# Patient Record
Sex: FEMALE | Race: WHITE | NOT HISPANIC OR LATINO | Employment: OTHER | URBAN - METROPOLITAN AREA
[De-identification: names, ages, dates, MRNs, and addresses within clinical notes are randomized per-mention and may not be internally consistent; named-entity substitution may affect disease eponyms.]

---

## 2017-01-01 ENCOUNTER — APPOINTMENT (OUTPATIENT)
Dept: GENERAL RADIOLOGY | Facility: HOSPITAL | Age: 62
End: 2017-01-01

## 2017-01-01 ENCOUNTER — HOSPITAL ENCOUNTER (OUTPATIENT)
Dept: RADIATION ONCOLOGY | Facility: HOSPITAL | Age: 62
Setting detail: RADIATION/ONCOLOGY SERIES
Discharge: HOME | End: 2017-11-16

## 2017-01-01 ENCOUNTER — OFFICE VISIT (OUTPATIENT)
Dept: RADIATION ONCOLOGY | Facility: HOSPITAL | Age: 62
End: 2017-01-01

## 2017-01-01 VITALS
WEIGHT: 192 LBS | BODY MASS INDEX: 30.99 KG/M2 | OXYGEN SATURATION: 95 % | TEMPERATURE: 97.8 F | DIASTOLIC BLOOD PRESSURE: 82 MMHG | HEART RATE: 75 BPM | SYSTOLIC BLOOD PRESSURE: 131 MMHG

## 2017-01-01 DIAGNOSIS — C34.11 MALIGNANT NEOPLASM OF UPPER LOBE OF RIGHT LUNG (HCC): Primary | ICD-10-CM

## 2017-01-01 PROCEDURE — G0463 HOSPITAL OUTPT CLINIC VISIT: HCPCS

## 2017-01-01 PROCEDURE — 71020 HC CHEST PA AND LATERAL: CPT

## 2017-01-02 ENCOUNTER — APPOINTMENT (OUTPATIENT)
Dept: CARDIOLOGY | Facility: HOSPITAL | Age: 62
End: 2017-01-02
Attending: FAMILY MEDICINE

## 2017-01-02 PROCEDURE — 93970 EXTREMITY STUDY: CPT

## 2017-01-06 ENCOUNTER — PREP FOR SURGERY (OUTPATIENT)
Dept: PULMONOLOGY | Facility: CLINIC | Age: 62
End: 2017-01-06

## 2017-01-06 DIAGNOSIS — R91.8 LUNG MASS: Primary | ICD-10-CM

## 2017-01-08 ENCOUNTER — APPOINTMENT (OUTPATIENT)
Dept: PREADMISSION TESTING | Facility: HOSPITAL | Age: 62
End: 2017-01-08

## 2017-01-08 VITALS — BODY MASS INDEX: 34.38 KG/M2 | HEIGHT: 63 IN | WEIGHT: 194 LBS

## 2017-01-08 DIAGNOSIS — R91.8 LUNG MASS: ICD-10-CM

## 2017-01-08 LAB
ALBUMIN SERPL-MCNC: 3.8 G/DL (ref 3.2–4.8)
ALBUMIN/GLOB SERPL: 1.4 G/DL (ref 1.5–2.5)
ALP SERPL-CCNC: 135 U/L (ref 25–100)
ALT SERPL W P-5'-P-CCNC: 51 U/L (ref 7–40)
ANION GAP SERPL CALCULATED.3IONS-SCNC: 8 MMOL/L (ref 3–11)
APTT PPP: 26.9 SECONDS (ref 24–31)
AST SERPL-CCNC: 28 U/L (ref 0–33)
BASOPHILS # BLD AUTO: 0.02 10*3/MM3 (ref 0–0.2)
BASOPHILS NFR BLD AUTO: 0.1 % (ref 0–1)
BILIRUB SERPL-MCNC: 0.2 MG/DL (ref 0.3–1.2)
BUN BLD-MCNC: 9 MG/DL (ref 9–23)
BUN/CREAT SERPL: 15 (ref 7–25)
CALCIUM SPEC-SCNC: 9.7 MG/DL (ref 8.7–10.4)
CHLORIDE SERPL-SCNC: 101 MMOL/L (ref 99–109)
CO2 SERPL-SCNC: 34 MMOL/L (ref 20–31)
CREAT BLD-MCNC: 0.6 MG/DL (ref 0.6–1.3)
DEPRECATED RDW RBC AUTO: 53.9 FL (ref 37–54)
EOSINOPHIL # BLD AUTO: 0.11 10*3/MM3 (ref 0.1–0.3)
EOSINOPHIL NFR BLD AUTO: 0.8 % (ref 0–3)
ERYTHROCYTE [DISTWIDTH] IN BLOOD BY AUTOMATED COUNT: 16.8 % (ref 11.3–14.5)
GFR SERPL CREATININE-BSD FRML MDRD: 101 ML/MIN/1.73
GLOBULIN UR ELPH-MCNC: 2.8 GM/DL
GLUCOSE BLD-MCNC: 100 MG/DL (ref 70–100)
HCT VFR BLD AUTO: 42.7 % (ref 34.5–44)
HGB BLD-MCNC: 14.7 G/DL (ref 11.5–15.5)
IMM GRANULOCYTES # BLD: 0.44 10*3/MM3 (ref 0–0.03)
IMM GRANULOCYTES NFR BLD: 3.3 % (ref 0–0.6)
INR PPP: 0.88
LYMPHOCYTES # BLD AUTO: 1.88 10*3/MM3 (ref 0.6–4.8)
LYMPHOCYTES NFR BLD AUTO: 14 % (ref 24–44)
MCH RBC QN AUTO: 30.2 PG (ref 27–31)
MCHC RBC AUTO-ENTMCNC: 34.4 G/DL (ref 32–36)
MCV RBC AUTO: 87.7 FL (ref 80–99)
MONOCYTES # BLD AUTO: 1.54 10*3/MM3 (ref 0–1)
MONOCYTES NFR BLD AUTO: 11.4 % (ref 0–12)
NEUTROPHILS # BLD AUTO: 9.46 10*3/MM3 (ref 1.5–8.3)
NEUTROPHILS NFR BLD AUTO: 70.4 % (ref 41–71)
PLATELET # BLD AUTO: 342 10*3/MM3 (ref 150–450)
PMV BLD AUTO: 10.2 FL (ref 6–12)
POTASSIUM BLD-SCNC: 4 MMOL/L (ref 3.5–5.5)
PROT SERPL-MCNC: 6.6 G/DL (ref 5.7–8.2)
PROTHROMBIN TIME: 9.5 SECONDS (ref 9.6–11.5)
RBC # BLD AUTO: 4.87 10*6/MM3 (ref 3.89–5.14)
SODIUM BLD-SCNC: 143 MMOL/L (ref 132–146)
WBC NRBC COR # BLD: 13.45 10*3/MM3 (ref 3.5–10.8)

## 2017-01-08 PROCEDURE — 93005 ELECTROCARDIOGRAM TRACING: CPT

## 2017-01-08 PROCEDURE — 85610 PROTHROMBIN TIME: CPT | Performed by: NURSE PRACTITIONER

## 2017-01-08 PROCEDURE — 93010 ELECTROCARDIOGRAM REPORT: CPT | Performed by: INTERNAL MEDICINE

## 2017-01-08 PROCEDURE — 85025 COMPLETE CBC W/AUTO DIFF WBC: CPT | Performed by: NURSE PRACTITIONER

## 2017-01-08 PROCEDURE — 36415 COLL VENOUS BLD VENIPUNCTURE: CPT

## 2017-01-08 PROCEDURE — 85730 THROMBOPLASTIN TIME PARTIAL: CPT | Performed by: NURSE PRACTITIONER

## 2017-01-08 PROCEDURE — 80053 COMPREHEN METABOLIC PANEL: CPT | Performed by: NURSE PRACTITIONER

## 2017-01-08 NOTE — PAT
ABNORMAL EKG REVIEWED WITH AND CLEARED BY DR MUJICA.     PATIENT REQUESTING TO HAVE IV ANTIBIOTICS SHE HAS BEEN RECEIVING DAILY AT [facility] INFECTIOUS DISEASE WHILE IN FOR PROCEDURE TOMORROW AS WELL AS ANY LAB WORK NECESSARY PER INFECTIOUS DISEASE.    PICC LINE SITE RUE NO REDNESS EDEMA OR DRAINAGE. DRESSING CLEAN DRY AND INTACT

## 2017-01-09 ENCOUNTER — APPOINTMENT (OUTPATIENT)
Dept: GENERAL RADIOLOGY | Facility: HOSPITAL | Age: 62
End: 2017-01-09

## 2017-01-09 ENCOUNTER — ANESTHESIA EVENT (OUTPATIENT)
Dept: GASTROENTEROLOGY | Facility: HOSPITAL | Age: 62
End: 2017-01-09

## 2017-01-09 ENCOUNTER — ANESTHESIA (OUTPATIENT)
Dept: GASTROENTEROLOGY | Facility: HOSPITAL | Age: 62
End: 2017-01-09

## 2017-01-09 ENCOUNTER — HOSPITAL ENCOUNTER (OUTPATIENT)
Facility: HOSPITAL | Age: 62
Setting detail: HOSPITAL OUTPATIENT SURGERY
Discharge: HOME | End: 2017-01-09
Attending: INTERNAL MEDICINE | Admitting: INTERNAL MEDICINE

## 2017-01-09 VITALS
RESPIRATION RATE: 18 BRPM | HEART RATE: 80 BPM | OXYGEN SATURATION: 94 % | SYSTOLIC BLOOD PRESSURE: 116 MMHG | TEMPERATURE: 97.7 F | DIASTOLIC BLOOD PRESSURE: 71 MMHG

## 2017-01-09 DIAGNOSIS — R91.8 LUNG MASS: ICD-10-CM

## 2017-01-09 PROCEDURE — 31653 BRONCH EBUS SAMPLNG 3/> NODE: CPT | Performed by: INTERNAL MEDICINE

## 2017-01-09 PROCEDURE — 25010000002 DEXAMETHASONE PER 1 MG: Performed by: NURSE ANESTHETIST, CERTIFIED REGISTERED

## 2017-01-09 PROCEDURE — 87102 FUNGUS ISOLATION CULTURE: CPT | Performed by: INTERNAL MEDICINE

## 2017-01-09 PROCEDURE — 71020 HC CHEST PA AND LATERAL: CPT

## 2017-01-09 PROCEDURE — 31628 BRONCHOSCOPY/LUNG BX EACH: CPT | Performed by: INTERNAL MEDICINE

## 2017-01-09 PROCEDURE — 87206 SMEAR FLUORESCENT/ACID STAI: CPT | Performed by: INTERNAL MEDICINE

## 2017-01-09 PROCEDURE — 31629 BRONCHOSCOPY/NEEDLE BX EACH: CPT | Performed by: INTERNAL MEDICINE

## 2017-01-09 PROCEDURE — 25010000002 CEFTRIAXONE PER 250 MG: Performed by: INTERNAL MEDICINE

## 2017-01-09 PROCEDURE — 31625 BRONCHOSCOPY W/BIOPSY(S): CPT | Performed by: INTERNAL MEDICINE

## 2017-01-09 PROCEDURE — 25010000002 NEOSTIGMINE 10 MG/10ML SOLUTION: Performed by: NURSE ANESTHETIST, CERTIFIED REGISTERED

## 2017-01-09 PROCEDURE — 31623 DX BRONCHOSCOPE/BRUSH: CPT | Performed by: INTERNAL MEDICINE

## 2017-01-09 PROCEDURE — 25010000002 ONDANSETRON PER 1 MG: Performed by: NURSE ANESTHETIST, CERTIFIED REGISTERED

## 2017-01-09 PROCEDURE — 76000 FLUOROSCOPY <1 HR PHYS/QHP: CPT

## 2017-01-09 PROCEDURE — 88305 TISSUE EXAM BY PATHOLOGIST: CPT | Performed by: INTERNAL MEDICINE

## 2017-01-09 PROCEDURE — 25010000002 PROPOFOL 10 MG/ML EMULSION: Performed by: NURSE ANESTHETIST, CERTIFIED REGISTERED

## 2017-01-09 PROCEDURE — 31627 NAVIGATIONAL BRONCHOSCOPY: CPT | Performed by: INTERNAL MEDICINE

## 2017-01-09 PROCEDURE — 87106 FUNGI IDENTIFICATION YEAST: CPT | Performed by: INTERNAL MEDICINE

## 2017-01-09 PROCEDURE — 71010 HC CHEST PA OR AP: CPT

## 2017-01-09 PROCEDURE — 31624 DX BRONCHOSCOPE/LAVAGE: CPT | Performed by: INTERNAL MEDICINE

## 2017-01-09 PROCEDURE — 76001 HC FLUORO OVER 1 HOUR: CPT

## 2017-01-09 PROCEDURE — 87205 SMEAR GRAM STAIN: CPT | Performed by: INTERNAL MEDICINE

## 2017-01-09 PROCEDURE — 87070 CULTURE OTHR SPECIMN AEROBIC: CPT | Performed by: INTERNAL MEDICINE

## 2017-01-09 PROCEDURE — 87116 MYCOBACTERIA CULTURE: CPT | Performed by: INTERNAL MEDICINE

## 2017-01-09 RX ORDER — FAMOTIDINE 20 MG/1
20 TABLET, FILM COATED ORAL ONCE
Status: CANCELLED | OUTPATIENT
Start: 2017-01-09 | End: 2017-01-09

## 2017-01-09 RX ORDER — PROMETHAZINE HYDROCHLORIDE 25 MG/ML
6.25 INJECTION, SOLUTION INTRAMUSCULAR; INTRAVENOUS ONCE AS NEEDED
Status: DISCONTINUED | OUTPATIENT
Start: 2017-01-09 | End: 2017-01-09 | Stop reason: HOSPADM

## 2017-01-09 RX ORDER — FENTANYL CITRATE 50 UG/ML
50 INJECTION, SOLUTION INTRAMUSCULAR; INTRAVENOUS
Status: DISCONTINUED | OUTPATIENT
Start: 2017-01-09 | End: 2017-01-09 | Stop reason: HOSPADM

## 2017-01-09 RX ORDER — FAMOTIDINE 10 MG/ML
20 INJECTION, SOLUTION INTRAVENOUS ONCE
Status: CANCELLED | OUTPATIENT
Start: 2017-01-09 | End: 2017-01-09

## 2017-01-09 RX ORDER — LIDOCAINE HYDROCHLORIDE 10 MG/ML
1 INJECTION, SOLUTION EPIDURAL; INFILTRATION; INTRACAUDAL; PERINEURAL ONCE
Status: CANCELLED | OUTPATIENT
Start: 2017-01-09 | End: 2017-01-09

## 2017-01-09 RX ORDER — PROPOFOL 10 MG/ML
VIAL (ML) INTRAVENOUS AS NEEDED
Status: DISCONTINUED | OUTPATIENT
Start: 2017-01-09 | End: 2017-01-09

## 2017-01-09 RX ORDER — MEPERIDINE HYDROCHLORIDE 25 MG/ML
12.5 INJECTION INTRAMUSCULAR; INTRAVENOUS; SUBCUTANEOUS
Status: DISCONTINUED | OUTPATIENT
Start: 2017-01-09 | End: 2017-01-09 | Stop reason: HOSPADM

## 2017-01-09 RX ORDER — ATRACURIUM BESYLATE 10 MG/ML
INJECTION, SOLUTION INTRAVENOUS AS NEEDED
Status: DISCONTINUED | OUTPATIENT
Start: 2017-01-09 | End: 2017-01-09 | Stop reason: SURG

## 2017-01-09 RX ORDER — SODIUM CHLORIDE, SODIUM LACTATE, POTASSIUM CHLORIDE, CALCIUM CHLORIDE 600; 310; 30; 20 MG/100ML; MG/100ML; MG/100ML; MG/100ML
9 INJECTION, SOLUTION INTRAVENOUS CONTINUOUS
Status: CANCELLED | OUTPATIENT
Start: 2017-01-09

## 2017-01-09 RX ORDER — FAMOTIDINE 10 MG/ML
20 INJECTION, SOLUTION INTRAVENOUS ONCE
Status: DISCONTINUED | OUTPATIENT
Start: 2017-01-09 | End: 2017-01-09 | Stop reason: HOSPADM

## 2017-01-09 RX ORDER — NEOSTIGMINE METHYLSULFATE 1 MG/ML
INJECTION, SOLUTION INTRAVENOUS AS NEEDED
Status: DISCONTINUED | OUTPATIENT
Start: 2017-01-09 | End: 2017-01-09 | Stop reason: SURG

## 2017-01-09 RX ORDER — PROPOFOL 10 MG/ML
VIAL (ML) INTRAVENOUS AS NEEDED
Status: DISCONTINUED | OUTPATIENT
Start: 2017-01-09 | End: 2017-01-09 | Stop reason: SURG

## 2017-01-09 RX ORDER — SODIUM CHLORIDE 0.9 % (FLUSH) 0.9 %
1-10 SYRINGE (ML) INJECTION AS NEEDED
Status: CANCELLED | OUTPATIENT
Start: 2017-01-09

## 2017-01-09 RX ORDER — LIDOCAINE HYDROCHLORIDE 20 MG/ML
INJECTION, SOLUTION INFILTRATION; PERINEURAL AS NEEDED
Status: DISCONTINUED | OUTPATIENT
Start: 2017-01-09 | End: 2017-01-09 | Stop reason: SURG

## 2017-01-09 RX ORDER — SODIUM CHLORIDE 0.9 % (FLUSH) 0.9 %
1-10 SYRINGE (ML) INJECTION AS NEEDED
Status: DISCONTINUED | OUTPATIENT
Start: 2017-01-09 | End: 2017-01-09 | Stop reason: HOSPADM

## 2017-01-09 RX ORDER — PROMETHAZINE HYDROCHLORIDE 25 MG/1
25 TABLET ORAL ONCE AS NEEDED
Status: DISCONTINUED | OUTPATIENT
Start: 2017-01-09 | End: 2017-01-09 | Stop reason: HOSPADM

## 2017-01-09 RX ORDER — SODIUM CHLORIDE, SODIUM LACTATE, POTASSIUM CHLORIDE, CALCIUM CHLORIDE 600; 310; 30; 20 MG/100ML; MG/100ML; MG/100ML; MG/100ML
9 INJECTION, SOLUTION INTRAVENOUS CONTINUOUS
Status: DISCONTINUED | OUTPATIENT
Start: 2017-01-09 | End: 2017-01-09 | Stop reason: HOSPADM

## 2017-01-09 RX ORDER — PROMETHAZINE HYDROCHLORIDE 25 MG/1
25 SUPPOSITORY RECTAL ONCE AS NEEDED
Status: DISCONTINUED | OUTPATIENT
Start: 2017-01-09 | End: 2017-01-09 | Stop reason: HOSPADM

## 2017-01-09 RX ORDER — GLYCOPYRROLATE 0.2 MG/ML
INJECTION INTRAMUSCULAR; INTRAVENOUS AS NEEDED
Status: DISCONTINUED | OUTPATIENT
Start: 2017-01-09 | End: 2017-01-09 | Stop reason: SURG

## 2017-01-09 RX ORDER — ONDANSETRON 2 MG/ML
4 INJECTION INTRAMUSCULAR; INTRAVENOUS ONCE AS NEEDED
Status: COMPLETED | OUTPATIENT
Start: 2017-01-09 | End: 2017-01-09

## 2017-01-09 RX ORDER — DEXAMETHASONE SODIUM PHOSPHATE 10 MG/ML
INJECTION INTRAMUSCULAR; INTRAVENOUS AS NEEDED
Status: DISCONTINUED | OUTPATIENT
Start: 2017-01-09 | End: 2017-01-09 | Stop reason: SURG

## 2017-01-09 RX ORDER — IPRATROPIUM BROMIDE AND ALBUTEROL SULFATE 2.5; .5 MG/3ML; MG/3ML
3 SOLUTION RESPIRATORY (INHALATION) ONCE AS NEEDED
Status: DISCONTINUED | OUTPATIENT
Start: 2017-01-09 | End: 2017-01-09 | Stop reason: HOSPADM

## 2017-01-09 RX ADMIN — DEXAMETHASONE SODIUM PHOSPHATE 8 MG: 10 INJECTION INTRAMUSCULAR; INTRAVENOUS at 09:25

## 2017-01-09 RX ADMIN — ROBINUL 0.2 MG: 0.2 INJECTION INTRAMUSCULAR; INTRAVENOUS at 10:55

## 2017-01-09 RX ADMIN — PROPOFOL 100 MG: 10 INJECTION, EMULSION INTRAVENOUS at 09:16

## 2017-01-09 RX ADMIN — NEOSTIGMINE METHYLSULFATE 2 MG: 1 INJECTION, SOLUTION INTRAVENOUS at 10:55

## 2017-01-09 RX ADMIN — CEFTRIAXONE 2 G: 1 INJECTION, POWDER, FOR SOLUTION INTRAMUSCULAR; INTRAVENOUS at 09:25

## 2017-01-09 RX ADMIN — EPHEDRINE SULFATE 10 MG: 50 INJECTION INTRAMUSCULAR; INTRAVENOUS; SUBCUTANEOUS at 09:48

## 2017-01-09 RX ADMIN — ONDANSETRON 4 MG: 2 INJECTION INTRAMUSCULAR; INTRAVENOUS at 10:55

## 2017-01-09 RX ADMIN — ATRACURIUM BESYLATE 40 MG: 10 INJECTION, SOLUTION INTRAVENOUS at 09:16

## 2017-01-09 RX ADMIN — ATRACURIUM BESYLATE 10 MG: 10 INJECTION, SOLUTION INTRAVENOUS at 10:00

## 2017-01-09 RX ADMIN — EPHEDRINE SULFATE 10 MG: 50 INJECTION INTRAMUSCULAR; INTRAVENOUS; SUBCUTANEOUS at 09:49

## 2017-01-09 RX ADMIN — EPHEDRINE SULFATE 5 MG: 50 INJECTION INTRAMUSCULAR; INTRAVENOUS; SUBCUTANEOUS at 10:34

## 2017-01-09 RX ADMIN — LIDOCAINE HYDROCHLORIDE 50 MG: 20 INJECTION, SOLUTION INFILTRATION; PERINEURAL at 09:16

## 2017-01-09 RX ADMIN — SODIUM CHLORIDE, POTASSIUM CHLORIDE, SODIUM LACTATE AND CALCIUM CHLORIDE 9 ML/HR: 600; 310; 30; 20 INJECTION, SOLUTION INTRAVENOUS at 08:36

## 2017-01-09 RX ADMIN — SODIUM CHLORIDE, POTASSIUM CHLORIDE, SODIUM LACTATE AND CALCIUM CHLORIDE: 600; 310; 30; 20 INJECTION, SOLUTION INTRAVENOUS at 09:13

## 2017-01-09 NOTE — PLAN OF CARE
Problem: Bronchoscopy (Adult)  Goal: Signs and Symptoms of Listed Potential Problems Will be Absent or Manageable (Bronchoscopy)  Outcome: Outcome(s) achieved Date Met:  01/09/17 01/09/17 1141   Bronchoscopy   Problems Assessed (Bronchoscopy) all   Problems Present (Bronchoscopy) none

## 2017-01-09 NOTE — ANESTHESIA PREPROCEDURE EVALUATION
Anesthesia Evaluation      Airway   Mallampati: I  TM distance: <3 FB  Neck ROM: full  no difficulty expected  Dental - normal exam     Pulmonary - normal exam   (+) pneumonia , COPD, shortness of breath,   Cardiovascular - normal exam  (+) hypertension, CAD,     Neuro/Psych  (+) seizures,    GI/Hepatic/Renal/Endo    (+)  GERD, hypothyroidism,     Musculoskeletal     Abdominal  - normal exam    Bowel sounds: normal.   Substance History      OB/GYN          Other                           Anesthesia Plan    ASA 3     general     intravenous induction

## 2017-01-09 NOTE — ANESTHESIA POSTPROCEDURE EVALUATION
Patient: Leonarda Benitez    Procedure Summary     Date Anesthesia Start Anesthesia Stop Room / Location    01/09/17 0913   [facility]       Procedure Diagnosis Surgeon Provider    BRONCHOSCOPY WITH NAVIGATION AND FLUORO (N/A Bronchus); BRONCHOSCOPY WITH ENDOBRONCHIAL ULTRASOUND (N/A Bronchus) Lung mass  (Lung mass [R91.8]) MD Dakota Nicholson MD          Anesthesia Type: general  Last vitals  BP   148/87   Temp   97.7   Pulse 86   Resp   24   SpO2   90%     Post Anesthesia Care and Evaluation    Patient location during evaluation: PACU  Patient participation: complete - patient participated  Level of consciousness: awake and alert  Pain management: adequate  Airway patency: patent  Anesthetic complications: No anesthetic complications  Cardiovascular status: hemodynamically stable and acceptable  Respiratory status: nonlabored ventilation, acceptable and nasal cannula  Hydration status: acceptable

## 2017-01-09 NOTE — OP NOTE
Bronchoscopy Procedural Note       Date of Operation: 1/9/2017    Indication: This is a 61 y.o.    Pre-op Diagnosis: Right upper lobe lung mass, left lower lobe pulmonary infiltrate, borderline mediastinal lymphadenopathy.    Post-op Diagnosis: Right upper lobe lung mass, left lower lobe pulmonary infiltrate, borderline mediastinal lymphadenopathy.    Surgeon: Ab Lockett MD    Referring Physician: Ninoska Gustafson    Procedures Performed:  Flexible fiberoptic bronchoscopy  Bronchial washings  BAL right upper lobe  Endobronchial brushings right upper lobe  Endobronchial biopsies right upper lobe  Endobronchial biopsy left lower lobe  Endobronchial ultrasound guided TBNA mediastinal lymph node  Navigational and radial / peripheral EBUS guided TBNA of right upper lobe lung mass with fluoroscopy.     Anesthesia: Gen. endotracheal, per anesthesia    Estimated Blood Loss: <5 ml    Description of Procedure:  Informed consent was obtained from the patient after the risks and benefits of the procedure including the risk of bleeding, infection, pneumothorax, medication reaction, and death were described.  The patient agreed and a signed informed consent forms placed on the chart.    The patient was brought to the endoscopy room air she was intubated by anesthesia to monitor the patient throughout the case.    I inserted an endobronchial ultrasound scope through the existing endotracheal tube and advanced it to the distal trachea.  I examined station 4L, 4R, 7, 10 L, 10 R, 11 L, and 11 R.  There was an approximately 1 cm lymph node at station 7.  There were tiny lymph nodes at stations 11 L and 11 R which were too small for safe biopsy attempt.  I then used endobronchial ultrasound to do multiple passes of a 19-gauge FNA needle at station 7 with adequate specimen obtained and minimal bleeding which was self-limited.  I then removed the endobronchial ultrasound scope.    Next, I inserted a therapeutic bronchoscope and  advanced it to the distal trachea.  I examined the right bronchial tree to at least the segmental level throughout.  There is some thick white mucus in the right lower lobe which was lavaged and suctioned clear.  There was normal anatomy.  There was an apparent endobronchial lesion in the apical segment of the right upper lobe which was very distal and could barely be seen through the scope.  I did do an endobronchial brushing and multiple endobronchial biopsies at this site.  I also did a bronchoalveolar lavage with 60 mL of sterile saline ×2 with approximately 45 mL of bloody return.  There is minimal oozing of blood after the biopsies/brushings which was self-limited.    I then examined the left bronchial tree to at least the segmental level.  Again, there is mild diffuse inflammation and normal anatomy.  There is some slightly abnormal mucosa within the left lower lobe, and an area noted to have a thick mucus plugging on prior bronchoscopy.  I took one endobronchial biopsy at this area with minimal oozing of blood which was self-limited.  Next    At that point, I positioned the scope within the trachea and inserted the extended working channel/locatable guide apparatus for the navigation portion of the procedure.  I did an automatic registration process which was deemed to be adequate.  I then navigated to the previously planned biopsy site within the right upper lobe at the site of the lung mass on CT scan using the navigational procedure.  Fluoroscopy was brought in and position was marked.  Radial EBUS was used to confirm position adjacent to the lung mass.  I then did multiple transbronchial biopsies at this location with adequate specimen obtained.  There was no significant bleeding.  I then did a mini bronchoalveolar lavage at that location and removed the extended working channel.    Finally, I did a brief airway inspection and cleaned up any residual secretions.  I turned the patient over to anesthesia  for extubation and postprocedure monitoring.    There were no immediate complications.    Specimens obtained:  1.  Right upper lobe endobronchial brushing.  2.  Right upper lobe bronchoalveolar lavage.  3.  Right upper lobe endobronchial biopsy.  4.  Left lower lobe endobronchial biopsy.  5.  Bronchial washings.  6.  Station 7 transbronchial needle aspiration.  7.  Transbronchial biopsy right upper lobe.    Findings and Recommendations:    Findings as noted above.    Patient is to follow up this Wednesday at 11 AM with me in the office for preliminary results.      Ab Lockett MD  Pulmonary and Critical Care Medicine   01/09/17 11:12 AM

## 2017-01-09 NOTE — PLAN OF CARE
Problem: Patient Care Overview (Adult)  Goal: Adult Individualization and Mutuality  Outcome: Outcome(s) achieved Date Met:  01/09/17

## 2017-01-09 NOTE — ANESTHESIA PROCEDURE NOTES
Airway  Urgency: elective    Date/Time: 1/9/2017 9:15 AM  End Time:1/9/2017 9:20 AM  Airway not difficult    General Information and Staff    Patient location during procedure: OR  Anesthesiologist: ELIAN CODY  CRNA: RAMILA LONDONO    Indications and Patient Condition  Indications for airway management: airway protection    Preoxygenated: yes  MILS not maintained throughout  Mask difficulty assessment: 1 - vent by mask    Final Airway Details  Final airway type: endotracheal airway      Successful airway: ETT  Cuffed: yes   Successful intubation technique: direct laryngoscopy  Facilitating devices/methods: intubating stylet and cricoid pressure  Endotracheal tube insertion site: oral  Blade: Jun  Blade size: #3  ETT size: 8.5 mm  Cormack-Lehane Classification: grade I - full view of glottis  Placement verified by: chest auscultation and capnometry   Inital cuff pressure (cm H2O): 21  Cuff volume (mL): 6  Measured from: lips  ETT to lips (cm): 20  Number of attempts at approach: 1    Additional Comments  Negative epigastric sounds, Breath sound equal bilaterally with symmetric chest rise and fall

## 2017-01-09 NOTE — PLAN OF CARE
Problem: Bronchoscopy (Adult)  Goal: Signs and Symptoms of Listed Potential Problems Will be Absent or Manageable (Bronchoscopy)  Outcome: Ongoing (interventions implemented as appropriate)

## 2017-01-09 NOTE — IP AVS SNAPSHOT
AFTER VISIT SUMMARY             Leonarda Benitez           About your hospitalization     You were admitted on:  January 9, 2017 You last received care in the:  [facility]       Procedures & Surgeries      Procedure(s) (LRB):  BRONCHOSCOPY WITH NAVIGATION AND FLUORO (N/A)  BRONCHOSCOPY WITH ENDOBRONCHIAL ULTRASOUND (N/A)     1/9/2017     Surgeon(s):  Ab Lockett MD  -------------------      Medications    If you or your caregiver advised us that you are currently taking a medication and that medication is marked below as “Resume”, this simply indicates that we have reviewed those medications to make sure our new therapy recommendations do not interfere.  If you have concerns about medications other than those new ones which we are prescribing today, please consult the physician who prescribed them (or your primary physician).  Our review of your home medications is not meant to indicate that we are directing their use.             Your Medications      CONTINUE taking these medications     acetaminophen 500 MG tablet   Take 650 mg by mouth 3 (Three) Times a Day As Needed for mild pain (1-3).   Commonly known as:  TYLENOL           AMBIEN 10 MG tablet   Take 10 mg by mouth Every Night.   Generic drug:  zolpidem           amLODIPine 10 MG tablet   Take 1 tablet by mouth Every Night.   Commonly known as:  NORVASC           atorvastatin 40 MG tablet   Take 40 mg by mouth Every Night.   Commonly known as:  LIPITOR           cefTRIAXone 40 MG/ML IVPB   Infuse 50 mL into a venous catheter Daily. Indications: Bacteria in the Blood   Commonly known as:  ROCEPHIN           citalopram 10 MG tablet   Take 40 mg by mouth Daily.   Commonly known as:  CeleXA           furosemide 20 MG tablet   Take 1 tablet by mouth Daily.   Commonly known as:  LASIX           HYDROcodone-acetaminophen 5-325 MG per tablet   Take 2 tablets by mouth Every 6 (Six) Hours As Needed for moderate pain (4-6) for up to 8  days.   Commonly known as:  NORCO           levothyroxine 88 MCG tablet   Take 88 mcg by mouth Daily.   Commonly known as:  SYNTHROID, LEVOTHROID           omeprazole 20 MG capsule   Take 20 mg by mouth Daily.   Commonly known as:  priLOSEC           saccharomyces boulardii 250 MG capsule   Take 1 capsule by mouth 2 (Two) Times a Day.   Commonly known as:  FLORASTOR                      Your Medications      Your Medication List           Morning Noon Evening Bedtime As Needed    acetaminophen 500 MG tablet   Take 650 mg by mouth 3 (Three) Times a Day As Needed for mild pain (1-3).   Commonly known as:  TYLENOL                                AMBIEN 10 MG tablet   Take 10 mg by mouth Every Night.   Generic drug:  zolpidem                                amLODIPine 10 MG tablet   Take 1 tablet by mouth Every Night.   Commonly known as:  NORVASC                                atorvastatin 40 MG tablet   Take 40 mg by mouth Every Night.   Commonly known as:  LIPITOR                                cefTRIAXone 40 MG/ML IVPB   Infuse 50 mL into a venous catheter Daily. Indications: Bacteria in the Blood   Commonly known as:  ROCEPHIN                                citalopram 10 MG tablet   Take 40 mg by mouth Daily.   Commonly known as:  CeleXA                                furosemide 20 MG tablet   Take 1 tablet by mouth Daily.   Commonly known as:  LASIX                                HYDROcodone-acetaminophen 5-325 MG per tablet   Take 2 tablets by mouth Every 6 (Six) Hours As Needed for moderate pain (4-6) for up to 8 days.   Commonly known as:  NORCO                                levothyroxine 88 MCG tablet   Take 88 mcg by mouth Daily.   Commonly known as:  SYNTHROID, LEVOTHROID                                omeprazole 20 MG capsule   Take 20 mg by mouth Daily.   Commonly known as:  priLOSEC                                saccharomyces boulardii 250 MG capsule   Take 1 capsule by mouth 2 (Two) Times a Day.  "  Commonly known as:  FLORASTOR                                         Instructions for After Discharge        Discharge References/Attachments     GENERAL ANESTHESIA, ADULT, CARE AFTER (ENGLISH)    FLEXIBLE BRONCHOSCOPY, CARE AFTER, EASY-TO-READ (ENGLISH)       Follow-ups for After Discharge        Follow-up Information     Follow up with Ab Lockett MD .    Specialty:  Pulmonary Disease    Why:  follow up on 16 at 11am.      Contact information:    Aakash DAVISON   JOR[address] [place] [address]  [phone]        [employer]end[employer] Signup     [employer] allows you to send messages to your doctor, view your test results, renew your prescriptions, schedule appointments, and more. To sign up, go to [employer] and click on the Sign Up Now link in the New User? box. Enter your Blendagram Activation Code exactly as it appears below along with the last four digits of your Social Security Number and your Date of Birth () to complete the sign-up process. If you do not sign up before the expiration date, you must request a new code.    Blendagram Activation Code: [email or web address]  Expires: 2017  1:25 PM    If you have questions, you can email [email or web address] or call [phone] to talk to our Blend[employer] staff. Remember, Blendagram is NOT to be used for urgent needs. For medical emergencies, dial 911.           Summary of Your Hospitalization        Reason for Hospitalization     Your primary diagnosis was:  Not on File      Care Providers     Provider Service Role Specialty    Ab Lockett MD Pulmonology Attending Provider Pulmonary Disease    Ab Lockett MD Pulmonology Surgeon  Pulmonary Disease      Your Allergies  Date Reviewed: 2017    Allergen Reactions    Aspirin Itching         Ciprofloxacin Myalgia    Affects muscles         Codeine Itching         Eggs Or Egg-Derived Products Not Noted    PATIENT STATES \"HISTORY OF ALLERGY TESTING TOLD SHE HAD ALLERGY " "TO EGGS\"         Penicillins Hives         Sulfa Antibiotics Other (See Comments)    Upsets stomach      Pending Labs     Order Current Status    AFB Culture In process    AFB Culture In process    Fungus Culture In process    Fungus Culture In process    Fungus Smear In process    Fungus Smear In process    Non-gynecologic Cytology In process    Respiratory Culture In process    Respiratory Culture In process    Tissue Exam In process      Patient Belongings Returned     Document Return of Belongings Flowsheet     Were the patient bedside belongings sent home?   Yes   Belongings Retrieved from Security & Sent Home   N/A    Belongings Sent to Safe   --   Medications Retrieved from Pharmacy & Sent Home   N/A              More Information      General Anesthesia, Adult, Care After  Refer to this sheet in the next few weeks. These instructions provide you with information on caring for yourself after your procedure. Your health care provider may also give you more specific instructions. Your treatment has been planned according to current medical practices, but problems sometimes occur. Call your health care provider if you have any problems or questions after your procedure.  WHAT TO EXPECT AFTER THE PROCEDURE  After the procedure, it is typical to experience:  · Sleepiness.  · Nausea and vomiting.  HOME CARE INSTRUCTIONS  · For the first 24 hours after general anesthesia:  ¨ Have a responsible person with you.  ¨ Do not drive a car. If you are alone, do not take public transportation.  ¨ Do not drink alcohol.  ¨ Do not take medicine that has not been prescribed by your health care provider.  ¨ Do not sign important papers or make important decisions.  ¨ You may resume a normal diet and activities as directed by your health care provider.  · Change bandages (dressings) as directed.  · If you have questions or problems that seem related to general anesthesia, call the hospital and ask for the anesthetist or " anesthesiologist on call.  SEEK MEDICAL CARE IF:  · You have nausea and vomiting that continue the day after anesthesia.  · You develop a rash.  SEEK IMMEDIATE MEDICAL CARE IF:   · You have difficulty breathing.  · You have chest pain.  · You have any allergic problems.     This information is not intended to replace advice given to you by your health care provider. Make sure you discuss any questions you have with your health care provider.     Document Released: 03/26/2002 Document Revised: 01/08/2016 Document Reviewed: 04/17/2013  [employer] Interactive Patient Education ©2016 Elsevier Inc.          Flexible Bronchoscopy, Care After  These instructions give you information on caring for yourself after your procedure. Your doctor may also give you more specific instructions. Call your doctor if you have any problems or questions after your procedure.  HOME CARE  · Do not eat or drink anything for 2 hours after your procedure. If you try to eat or drink before the medicine wears off, food or drink could go into your lungs. You could also burn yourself.  · After 2 hours have passed and when you can cough and gag normally, you may eat soft food and drink liquids slowly.  · The day after the test, you may eat your normal diet.  · You may do your normal activities.  · Keep all doctor visits.  GET HELP RIGHT AWAY IF:  · You get more and more short of breath.  · You get light-headed.  · You feel like you are going to pass out (faint).  · You have chest pain.  · You have new problems that worry you.  · You cough up more than a little blood.  · You cough up more blood than before.  MAKE SURE YOU:  · Understand these instructions.  · Will watch your condition.  · Will get help right away if you are not doing well or get worse.     This information is not intended to replace advice given to you by your health care provider. Make sure you discuss any questions you have with your health care provider.     Document Released:  10/15/2010 Document Revised: 12/23/2014 Document Reviewed: 08/22/2014  [employer] Interactive Patient Education ©2016 Elsevier Inc.            SYMPTOMS OF A STROKE    Call 911 or have someone take you to the Emergency Department if you have any of the following:    · Sudden numbness or weakness of your face, arm or leg especially on one side of the body  · Sudden confusion, diffiiculty speaking or trouble understanding   · Changes in your vision or loss of sight in one eye  · Sudden severe headache with no known cause  · sudden dizziness, trouble walking, loss of balance or coordination    It is important to seek emergency care right away if you have further stroke symptoms. If you get emergency help quickly, the powerful clot-dissolving medicines can reduce the disabilities caused by a stroke.     For more information:    American Stroke Association  0-718-7-STROKE  www.strokeassociation.org           IF YOU SMOKE OR USE TOBACCO PLEASE READ THE FOLLOWING:    Why is smoking bad for me?  Smoking increases the risk of heart disease, lung disease, vascular disease, stroke, and cancer.     If you smoke, STOP!    If you would like more information on quitting smoking, please visit the [employer] website: [email or web address]   This link will provide additional resources including the QUIT line and the Beat the Pack support groups.     For more information:    American Cancer Society  (898) 733-6722    American Heart Association  1-850.117.1139               YOU ARE THE MOST IMPORTANT FACTOR IN YOUR RECOVERY.     Follow all instructions carefully.     I have reviewed my discharge instructions with my nurse, including the following information, if applicable:     Information about my illness and diagnosis   Follow up appointments (including lab draws)   Wound Care   Equipment Needs   Medications (new and continuing) along with side effects   Preventative information such as  vaccines and smoking cessations   Diet   Pain   I know when to contact my Doctor's office or seek emergency care      I want my nurse to describe the side effects of my medications: YES NO   If the answer is no, I understand the side effects of my medications: YES NO   My nurse described the side effects of my medications in a way that I could understand: YES NO   I have taken my personal belongings and my own medications with me at discharge: YES NO            I have received this information and my questions have been answered. I have discussed any concerns I see with this plan with the nurse or physician. I understand these instructions.    Signature of Patient or Responsible Person: _____________________________________    Date: _________________  Time: __________________    Signature of Healthcare Provider: _______________________________________  Date: _________________  Time: __________________

## 2017-01-10 ENCOUNTER — LAB (OUTPATIENT)
Dept: LAB | Facility: HOSPITAL | Age: 62
End: 2017-01-10

## 2017-01-10 DIAGNOSIS — J13: Primary | ICD-10-CM

## 2017-01-10 LAB
ALBUMIN SERPL-MCNC: 3.8 G/DL (ref 3.2–4.8)
ALBUMIN/GLOB SERPL: 1.5 G/DL (ref 1.5–2.5)
ALP SERPL-CCNC: 123 U/L (ref 25–100)
ALT SERPL W P-5'-P-CCNC: 47 U/L (ref 7–40)
ANION GAP SERPL CALCULATED.3IONS-SCNC: 7 MMOL/L (ref 3–11)
AST SERPL-CCNC: 29 U/L (ref 0–33)
BASOPHILS # BLD AUTO: 0.01 10*3/MM3 (ref 0–0.2)
BASOPHILS NFR BLD AUTO: 0 % (ref 0–1)
BILIRUB SERPL-MCNC: 0.3 MG/DL (ref 0.3–1.2)
BUN BLD-MCNC: 10 MG/DL (ref 9–23)
BUN/CREAT SERPL: 16.7 (ref 7–25)
CALCIUM SPEC-SCNC: 9.5 MG/DL (ref 8.7–10.4)
CHLORIDE SERPL-SCNC: 104 MMOL/L (ref 99–109)
CO2 SERPL-SCNC: 33 MMOL/L (ref 20–31)
CREAT BLD-MCNC: 0.6 MG/DL (ref 0.6–1.3)
CYTO UR: NORMAL
DEPRECATED RDW RBC AUTO: 53.3 FL (ref 37–54)
EOSINOPHIL # BLD AUTO: 0.01 10*3/MM3 (ref 0.1–0.3)
EOSINOPHIL NFR BLD AUTO: 0 % (ref 0–3)
ERYTHROCYTE [DISTWIDTH] IN BLOOD BY AUTOMATED COUNT: 16.8 % (ref 11.3–14.5)
GFR SERPL CREATININE-BSD FRML MDRD: 101 ML/MIN/1.73
GLOBULIN UR ELPH-MCNC: 2.6 GM/DL
GLUCOSE BLD-MCNC: 101 MG/DL (ref 70–100)
HCT VFR BLD AUTO: 39.9 % (ref 34.5–44)
HGB BLD-MCNC: 13.6 G/DL (ref 11.5–15.5)
IMM GRANULOCYTES # BLD: 0.15 10*3/MM3 (ref 0–0.03)
IMM GRANULOCYTES NFR BLD: 0.7 % (ref 0–0.6)
LAB AP CASE REPORT: NORMAL
LAB AP CLINICAL INFORMATION: NORMAL
LAB AP DIAGNOSIS COMMENT: NORMAL
LYMPHOCYTES # BLD AUTO: 1.89 10*3/MM3 (ref 0.6–4.8)
LYMPHOCYTES NFR BLD AUTO: 8.6 % (ref 24–44)
Lab: NORMAL
MCH RBC QN AUTO: 29.6 PG (ref 27–31)
MCHC RBC AUTO-ENTMCNC: 34.1 G/DL (ref 32–36)
MCV RBC AUTO: 86.9 FL (ref 80–99)
MONOCYTES # BLD AUTO: 1.84 10*3/MM3 (ref 0–1)
MONOCYTES NFR BLD AUTO: 8.4 % (ref 0–12)
NEUTROPHILS # BLD AUTO: 18.12 10*3/MM3 (ref 1.5–8.3)
NEUTROPHILS NFR BLD AUTO: 82.3 % (ref 41–71)
PATH REPORT.FINAL DX SPEC: NORMAL
PATH REPORT.GROSS SPEC: NORMAL
PLATELET # BLD AUTO: 393 10*3/MM3 (ref 150–450)
PMV BLD AUTO: 10.1 FL (ref 6–12)
POTASSIUM BLD-SCNC: 4.1 MMOL/L (ref 3.5–5.5)
PROT SERPL-MCNC: 6.4 G/DL (ref 5.7–8.2)
RBC # BLD AUTO: 4.59 10*6/MM3 (ref 3.89–5.14)
SODIUM BLD-SCNC: 144 MMOL/L (ref 132–146)
WBC NRBC COR # BLD: 22.02 10*3/MM3 (ref 3.5–10.8)

## 2017-01-10 PROCEDURE — 36415 COLL VENOUS BLD VENIPUNCTURE: CPT

## 2017-01-10 PROCEDURE — 85025 COMPLETE CBC W/AUTO DIFF WBC: CPT | Performed by: INTERNAL MEDICINE

## 2017-01-10 PROCEDURE — 80053 COMPREHEN METABOLIC PANEL: CPT | Performed by: INTERNAL MEDICINE

## 2017-01-11 ENCOUNTER — OFFICE VISIT (OUTPATIENT)
Dept: PULMONOLOGY | Facility: CLINIC | Age: 62
End: 2017-01-11

## 2017-01-11 VITALS
HEIGHT: 63 IN | OXYGEN SATURATION: 91 % | HEART RATE: 98 BPM | DIASTOLIC BLOOD PRESSURE: 88 MMHG | RESPIRATION RATE: 16 BRPM | TEMPERATURE: 97.7 F | WEIGHT: 194 LBS | BODY MASS INDEX: 34.38 KG/M2 | SYSTOLIC BLOOD PRESSURE: 136 MMHG

## 2017-01-11 DIAGNOSIS — J13 PNEUMONIA OF LEFT LOWER LOBE DUE TO STREPTOCOCCUS PNEUMONIAE (HCC): ICD-10-CM

## 2017-01-11 DIAGNOSIS — C34.91 SMALL CELL CARCINOMA OF RIGHT LUNG (HCC): ICD-10-CM

## 2017-01-11 DIAGNOSIS — R06.02 SHORTNESS OF BREATH: Primary | ICD-10-CM

## 2017-01-11 DIAGNOSIS — J41.1 MUCOPURULENT CHRONIC BRONCHITIS (HCC): ICD-10-CM

## 2017-01-11 DIAGNOSIS — R07.81 RIB PAIN ON LEFT SIDE: ICD-10-CM

## 2017-01-11 LAB
BACTERIA SPEC RESP CULT: ABNORMAL
BACTERIA SPEC RESP CULT: ABNORMAL
BACTERIA SPEC RESP CULT: NORMAL
GRAM STN SPEC: ABNORMAL
GRAM STN SPEC: NORMAL
LAB AP CASE REPORT: NORMAL
Lab: NORMAL
PATH REPORT.FINAL DX SPEC: NORMAL

## 2017-01-11 PROCEDURE — 99215 OFFICE O/P EST HI 40 MIN: CPT | Performed by: INTERNAL MEDICINE

## 2017-01-11 PROCEDURE — 94010 BREATHING CAPACITY TEST: CPT | Performed by: INTERNAL MEDICINE

## 2017-01-11 NOTE — H&P
Ab Lockett MD Physician Signed Medicine Consults Date of Service: 12/29/2016  5:58 PM   Related encounter: Admission (Discharged) from 12/28/2016 in [address] with Margarito Silva MD     Consult Orders:               Expand All Collapse All    []Hide copied text  Pulmonary/Critical Care History and Physical Exam         Patient Care Team:  Don Mora MD as PCP - General  Don Mora MD as PCP - Family Medicine     Chief Complaint: No chief complaint on file.        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 She was a direct transfer from her PCP to the hosptital for c/o worsening shortness of breath, cough, fever. Symptom onset x 3 weeks ago. Not improved with cough suppressants or inhalers. Treated with 2 rounds of antibiotics and failed outpatient Levaquin (10 days) for presumed pneumonia (did not get an CXR at first encounter). In the office, she was febrile with temp 101.6, had hypoxia with O2 sats 88% on room air and tachypnea. She was transferred to [facility] for further evaluation. She was started on IV Doxycycline and IV Meropenem, given Solumedrol and duo nebs. She had a chest x-ray which is abnormal and appears to have possible lung mass in right upper lobe. STAT CT angiogram Was obtained and showed a right upper lobe lung mass, multiple pulmonary nodules and a left lower lobe infiltrate.       I performed a bronchoscopy with endobronchial brushings and biopsies on 12/30/2016. There was very thick mucus plugging of the left lower lobe and was able to extract basically a cast of the bronchial tree in that area. There is diffuse inflammation but no definite endobronchial tumor in the airways that were examined.     History taken from: patient, chart      Medical History         Past Medical History    Diagnosis Date   • Arthritis     • Cancer     • COPD (chronic obstructive pulmonary disease)     • Coronary artery disease     • Disease of thyroid gland     • Former smoker, stopped smoking many years ago - quit smoking 12/28/2009 12/28/2016   • GERD (gastroesophageal reflux disease) 12/28/2016   • History of cancer tonsil (2009) - treated with radiation 12/28/2016   • Hyperlipemia     • Hypertension     • Insomnia 12/28/2016             Surgical History          Past Surgical History   Procedure Laterality Date   • Hysterectomy                      Family History   Problem Relation Age of Onset   • Stomach cancer Mother     • Hypertension Sister     • No Known Problems Daughter     • Hypertension Sister     • Lung cancer Neg Hx     • Diabetes Neg Hx            Social History    Social History            Social History   • Marital status:        Spouse name: N/A   • Number of children: N/A   • Years of education: N/A          Occupational History   • Not on file.            Social History Main Topics   • Smoking status: Former Smoker       Packs/day: 1.50       Types: Cigarettes       Quit date: 12/28/2009   • Smokeless tobacco: Not on file   • Alcohol use No   • Drug use: No   • Sexual activity: Defer           Other Topics Concern   • Not on file          Social History Narrative   • No narrative on file                  Allergies   Allergen Reactions   • Aspirin Itching   • Ciprofloxacin Myalgia       Affects muscles   • Codeine Itching   • Penicillins Hives   • Sulfa Antibiotics Other (See Comments)       Upsets stomach         PMH/FH/SocH were reviewed by me and updates were made.      Review of Systems:   All systems were reviewed and negative except as noted in subjective.        Objective        Vital Signs  Temp: [97.7 °F (36.5 °C)-98.3 °F (36.8 °C)] 98.3 °F (36.8 °C)  Heart Rate: [81-97] 96  Resp: [18-20] 20  BP: (135-149)/() 149/100     Physical Exam:      General Appearance:  Alert,  cooperative, in no acute distress   Head:  Normocephalic, without obvious abnormality, atraumatic   Eyes:      Lids and lashes normal, conjunctivae and sclerae normal, no icterus, no pallor, corneas clear, PER   ENMT: Ears appear intact with no abnormalities noted     No oral lesions, no thrush, oral mucosa moist   Neck: No adenopathy, supple, trachea midline, no thyromegaly, no carotid bruit, no JVD         Lungs/resp:  Normal effort, symmetric chest rise, no crepitus, left basilar rales and decreased breath sounds, mild chest wall tenderness to the left chest    Heart/CV:  Regular rhythm and normal rate, normal S1 and S2, no murmur, no gallop, no rub, no click   Abdomen/GI:  Normal bowel sounds, no masses, no organomegaly, soft non-tender, non-distended, no guarding, no rebound tenderness   G/U:  Deferred   Extremities/MSK: No clubbing, cyanosis or edema. No deformities.    Pulses: Pulses palpable and equal bilaterally   Skin: No bleeding, bruising or rash   Hem/Lymph: No cervical or supraclavicular adenopathy.    Neurologic: Moves all extremities with no obvious focal motor deficit. Cranial nerves 2 - 12 grossly intact   Psychiatric: Normal mood and affect, oriented x 3.       Results Review:   I reviewed the patient's new clinical results.      Results from last 7 days  Lab Units 12/29/16  0542 12/28/16  2344 12/28/16  1809   SODIUM mmol/L 133 --  129*   POTASSIUM mmol/L 3.4* 2.8* 3.0*   CHLORIDE mmol/L 100 --  92*   TOTAL CO2 mmol/L 24.0 --  25.0   BUN mg/dL 10 --  9   CREATININE mg/dL 0.50* --  0.60   CALCIUM mg/dL 9.0 --  9.5   BILIRUBIN mg/dL 0.9 --  1.4*   ALK PHOS U/L 129* --  155*   ALT (SGPT) U/L 41* --  54*   AST (SGOT) U/L 32 --  57*   GLUCOSE mg/dL 130* --  99         Results from last 7 days  Lab Units 12/29/16  0542 12/28/16  1809   WBC 10*3/mm3 28.44* 25.49*   HEMOGLOBIN g/dL 13.9 14.8   HEMATOCRIT % 38.9 41.1   PLATELETS 10*3/mm3 272 255   MONOCYTES % % 8.0 --             I reviewed the patient's  new imaging including images and reports.  CT of chest:  CT Angiography Chest With Intravenous Contrast.      CLINICAL HISTORY:  61 years old, female; Signs and symptoms; Other: See notes; Additional info:    R/O pe and investigate abnormal chest xray      TECHNIQUE:  Axial computed tomographic angiography images of the chest with intravenous    contrast using pulmonary embolism protocol. This CT exam was performed using    one or more of the following dose reduction techniques: automated exposure    control, adjustment of the mA and/or kV according to patient size, and/or use    of iterative reconstruction technique.  MIP reconstructed images were created and reviewed.      CONTRAST:  60 mL of Isovue 370 administered intravenously.      COMPARISON:  No relevant prior studies available.      FINDINGS:      Heart size within normal limits. Small pericardial effusion. No enlarged or    abnormal appearing mediastinal/hilar lymph nodes identified.      No pulmonary emboli identified. There is no evidence of thoracic aortic    aneurysm or dissection within the limits imposed by heart motion artifact.      Mild emphysematous changes of both lungs.      Right upper lobe:  Multiple nodules and masses including a 3.9 cm x 2.8 cm mass in the right lung    apex on series 4 image 27 and a 1.5 cm x 1.3 cm nodule anteriorly on series 4    image 37.      Right middle lobe:  Two nodules at the lateral periphery on series 4 image 52. These are    approximately the same size. The more anterior measures 1.2 cm x 0.9 cm and    demonstrates central cavitation.      Right lower lobe:  Mild patchy atelectasis scattered throughout.      Left upper lobe:  Mild patchy groundglass opacity in the left lung apex. There is a 1.0 cm x 0.9    cm nodule laterally on series 4 image 33. Moderate area of airspace    consolidation in the lingula.      Left lower lobe:  Large amount of airspace consolidation.      No pneumothorax or pleural  effusion.      Images of the upper abdomen were reviewed. There is a 1.6 cm cyst exophytic    from the left kidney upper pole.      Visualized bones are unremarkable.      IMPRESSION:  1. Multiple nodules and masses including a dominant mass in the right lung    apex. Findings are strongly concerning for metastatic malignancy.  2. Areas of airspace consolidation in the left upper lobe and left lower lobe,    compatible with pneumonia (tumor may be underlying).  3. No pulmonary emboli identified.           Medication Review:      amLODIPine 5 mg Oral Nightly   atorvastatin 40 mg Oral Nightly   ceftriaxone 2 g Intravenous Q24H   citalopram 40 mg Oral Nightly   enoxaparin 40 mg Subcutaneous Q AM   ipratropium-albuterol 3 mL Nebulization Q4H - RT   levothyroxine 88 mcg Oral Q AM   methylPREDNISolone sodium succinate 40 mg Intravenous Q12H   metroNIDAZOLE 500 mg Intravenous Q8H   pantoprazole 40 mg Oral QAM   zolpidem 10 mg Oral Nightly         sodium chloride 75 mL/hr Last Rate: 75 mL/hr (12/29/16 0909)         Assessment/Plan            Hospital Problem List      * (Principal)Left lower lobe pneumonia (poa)     Shortness of breath     Leukocytosis     Hyponatremia     Hypokalemia     Arthritis     COPD (chronic obstructive pulmonary disease)     Coronary artery disease     Hypothyroidism     Hyperlipidemia     Hypertension     Former smoker, stopped smoking many years ago - quit smoking 12/28/2009     Mass of upper lobe of right lung     GERD (gastroesophageal reflux disease)     Insomnia     Rib pain on left side     History of cancer tonsil (2009) - treated with radiation     Sepsis     Hypoxia          61-year-old former smoker with a history of tonsillar cancer diagnosed in 2009 and treated with radiation was admitted with pneumonia. She is growing Streptococcus pneumonia from her blood. Her antibiotics are being managed by infectious disease.     CT scan of the chest shows multiple lung nodules highly suspicious  for metastatic cancer. She does not have any significant mediastinal lymphadenopathy.     I was asked to see the patient for consideration of bronchoscopy, especially in light of a possible end of bronchial obstruction involving a subsegment of the left lower lobe. On reviewing the CT scan, it does appear she has obstruction of the left lower lobe. She may also have a small area of obstruction within the right upper lobe. Most likely this involves endobronchial tumor, however, I suppose an endobronchial foreign body (i.e. food) could also cause a postobstructive pneumonia.     My initial inclination was to treat the patient for pneumonia prior to a bronchoscopy. After further consideration, and discussion with the patient, I feel that an airway inspection may be helpful to rule out an endobronchial foreign body, and potentially get endobronchial biopsies if these appear to be easily accessible with acceptable risk. The patient is currently on 2 L of oxygen without dyspnea and adequate oxygenation. I feel that she would tolerate a bronchoscopy. I will not plan on doing extensive transbronchial biopsies in light of her active infection. I will do endobronchial biopsies if I feel this is safe at the time. All this was discussed with the patient, including the risk of bleeding, worsening respiratory failure and death. The patient wishes to pursue a bronchoscopy as outlined above.     Plan:  1.  I reviewed the above history and physical examination.  No changes other than changes noted.  The patient continues to take antibiotics per Dr. Locke as an outpatient.  She is feeling overall better but still has some mild residual left chest pain.  Initial bronchoscopy was nondiagnostic other than mucus plugging.  Plan at this point will be for a repeat bronchoscopy with endobronchial ultrasound guided biopsies, navigational directed biopsies for diagnosis/staging.    Patient was seen on 1/9/2017 prior to  procedure.       Ab Lockett MD                            Revision History       Date/Time User Provider Type Action     12/29/2016  8:54 PM Ab Lockett MD Physician Sign     12/29/2016  6:19 PM HELEN Anderson Nurse Practitioner [place]     View Details Report          Routing History       Date/Time From To Method     12/29/2016  8:54 PM MD Brandon Nicholson MD In Basket

## 2017-01-11 NOTE — MR AVS SNAPSHOT
Leonarda Benitez   1/11/2017 11:30 AM   Office Visit    Dept Phone:  [phone]   Encounter #:  [id number]    Provider:  Ab Lockett MD   Department:  [facility]                Your Full Care Plan              Your Updated Medication List          This list is accurate as of: 1/11/17 12:40 PM.  Always use your most recent med list.                acetaminophen 500 MG tablet   Commonly known as:  TYLENOL       AMBIEN 10 MG tablet   Generic drug:  zolpidem       amLODIPine 10 MG tablet   Commonly known as:  NORVASC   Take 1 tablet by mouth Every Night.       atorvastatin 40 MG tablet   Commonly known as:  LIPITOR       cefTRIAXone 40 MG/ML IVPB   Commonly known as:  ROCEPHIN   Infuse 50 mL into a venous catheter Daily. Indications: Bacteria in the Blood       citalopram 10 MG tablet   Commonly known as:  CeleXA       furosemide 20 MG tablet   Commonly known as:  LASIX   Take 1 tablet by mouth Daily.       levothyroxine 88 MCG tablet   Commonly known as:  SYNTHROID, LEVOTHROID       omeprazole 20 MG capsule   Commonly known as:  priLOSEC       saccharomyces boulardii 250 MG capsule   Commonly known as:  FLORASTOR   Take 1 capsule by mouth 2 (Two) Times a Day.               We Performed the Following     Spirometry Without Bronchodilator       You Were Diagnosed With        Codes Comments    Shortness of breath    -  Primary ICD-10-CM: R06.02  ICD-9-CM: 786.05     Mucopurulent chronic bronchitis     ICD-10-CM: J41.1  ICD-9-CM: 491.1     Small cell carcinoma of right lung     ICD-10-CM: C34.91  ICD-9-CM: 162.9     Rib pain on left side     ICD-10-CM: R07.81  ICD-9-CM: 786.50     Pneumonia of left lower lobe due to Streptococcus pneumoniae     ICD-10-CM: J13  ICD-9-CM: 481       Instructions     None    Patient Instructions History      Upcoming Appointments     Visit Type Date Time Department    FOLLOW UP 1/11/2017 11:30 AM [facility]    "FOLLOW UP 3/15/2017  3:00 PM [facility]    SPIROMETRY PRE POST 3/15/2017  2:30 PM [facility]      MyChart Signup     [facility] allows you to send messages to your doctor, view your test results, renew your prescriptions, schedule appointments, and more. To sign up, go to [employer] and click on the Sign Up Now link in the New User? box. Enter your [employer] [facility] Activation Code exactly as it appears below along with the last four digits of your Social Security Number and your Date of Birth () to complete the sign-up process. If you do not sign up before the expiration date, you must request a new code.    [employer] Activation Code: [email or web address]  Expires: 2017  1:25 PM    If you have questions, you can email [email or web address] or call [phone] to talk to our [employer] staff. Remember, [employer] [facility] is NOT to be used for urgent needs. For medical emergencies, dial 911.               Other Info from Your Visit           Your Appointments     Mar 15, 2017  2:30 PM EDT   Spirometry Pre Post with P[facility]   [facility] (--)    [address]  [facility] [address]   [phone]            Mar 15, 2017  3:00 PM EDT   Follow Up with Ab Lockett MD   [facility]    [address]  [facility] [address]   [phone]           Arrive 15 minutes prior to appointment.              Allergies     Aspirin  Itching    Ciprofloxacin  Myalgia    Affects muscles    Codeine  Itching    Eggs Or Egg-derived Products      PATIENT STATES \"HISTORY OF ALLERGY TESTING TOLD SHE HAD ALLERGY TO EGGS\"    Penicillins  Hives    Sulfa Antibiotics  Other (See Comments)    Upsets stomach      Reason for Visit     Breathing Problem           Vital Signs     Blood Pressure Pulse Temperature Respirations Height Weight    136/88 98 97.7 °F (36.5 °C) 16 63\" (160 cm) " 194 lb (88 kg)    Oxygen Saturation Body Mass Index Smoking Status             91% 34.37 kg/m2 Former Smoker         Problems and Diagnoses Noted     Left lower lobe pneumonia    Chronic bronchitis    Rib pain on left side    Shortness of breath    Small cell carcinoma of right lung      Results     Spirometry Without Bronchodilator

## 2017-01-11 NOTE — PROGRESS NOTES
Pulmonary Follow-up      Patient Care Team:  Don Mora MD as PCP - General  Don Mora MD as PCP - Family Medicine    Chief Complaint / Reason: Follow-up of lung mass status post biopsy.      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 She was a direct transfer from her PCP to the hosptital for c/o worsening shortness of breath, cough, fever. Symptom onset x 3 weeks ago. Not improved with cough suppressants or inhalers. Treated with 2 rounds of antibiotics and failed outpatient Levaquin (10 days) for presumed pneumonia (did not get an CXR at first encounter). In the office, she was febrile with temp 101.6, had hypoxia with O2 sats 88% on room air and tachypnea. She was transferred to [facility] for further evaluation. She was started on IV Doxycycline and IV Meropenem, given Solumedrol and duo nebs. She had a chest x-ray which is abnormal and appears to have possible lung mass in right upper lobe. STAT CT angiogram  Was obtained and showed a right upper lobe lung mass, multiple pulmonary nodules and a left lower lobe infiltrate.      I performed a bronchoscopy with endobronchial brushings and biopsies on 12/30/2016. There was very thick mucus plugging of the left lower lobe and was able to extract basically a cast of the bronchial tree in that area. There is diffuse inflammation but no definite endobronchial tumor in the airways that were examined.    Interval History: I performed a repeat bronchoscopy with EBUS / navigation / radial EBUS on 1/9/16.  Results showed metastatic small cell lung cancer.     The patient reports she is overall feeling better and continues on antibiotics managed by Dr. Locke.  She is going to see him again on Tuesday.  She does still report some left chest  pain but it is much better.     History taken from: patient    PMH/FH/Social History were reviewed and updated appropriately in the electronic medical record.     Review of Systems:   Review of Systems  All other systems were reviewed and are negative.  Exceptions are noted in the subjective or above.      Objective     Vital Signs  Temp:  [97.7 °F (36.5 °C)] 97.7 °F (36.5 °C)  Heart Rate:  [98] 98  Resp:  [16] 16  BP: (136)/(88) 136/88       Physical Exam:     Constitutional:    Alert, chronically ill-appearing, cooperative, in no acute distress   Head:    Normocephalic, without obvious abnormality, atraumatic   Eyes:            Lids and lashes normal, conjunctivae and sclerae normal, no   icterus, no pallor, corneas clear, PER   ENMT:   Ears appear intact with no abnormalities noted      No oral lesions   Neck:   No adenopathy, supple, trachea midline, no thyromegaly, no JVD       Lungs/Resp:     Normal effort, symmetric chest rise, no crepitus, mild rhonchi on left                                             Neurologic:    Psychiatric:       Moves all extremities with no obvious focal motor deficit.  Cranial nerves 2 - 12 grossly intact   Oriented x 3, normal affect.             Results Review:     I reviewed the patient's new clinical results.   Pathology results from 1/9/2017 showed small cell carcinoma in right upper lobe endobronchial biopsy.  And small cell carcinoma in right upper lobe transbronchial biopsies and transbronchial needle aspirate.    Imaging:  I reviewed the patient's new imaging including reviewing the images and radiologist's report.  Imaging reviewed with the patient today.    PFTs: Spirometry done today: FVC 2.78 L or 90% predicted.  FEV1 1.97 L or 83% predicted.  FEV1 to FVC ratio 71%.  Maximal voluntary ventilation 74% predicted.    Interpretation: Borderline obstruction that does not meet criteria for COPD.  Low maximal voluntary ventilation.  No evidence of restriction.  No prior  studies available for comparison.    Medication Review:       No current facility-administered medications for this visit.     Assessment/Plan   Problems Addressed this Visit        Respiratory    Shortness of breath - Primary    Relevant Orders    Spirometry Without Bronchodilator (Completed)    Left lower lobe pneumonia (poa)    Relevant Medications    Fluticasone Furoate-Vilanterol (BREO ELLIPTA) 100-25 MCG/INH aerosol powder     Mucopurulent chronic bronchitis    Relevant Medications    Fluticasone Furoate-Vilanterol (BREO ELLIPTA) 100-25 MCG/INH aerosol powder     Small cell carcinoma of right lung    Relevant Medications    Fluticasone Furoate-Vilanterol (BREO ELLIPTA) 100-25 MCG/INH aerosol powder     Other Relevant Orders    Ambulatory Referral to Oncology       Nervous and Auditory    Rib pain on left side        Unfortunate woman found to have metastatic small cell cancer on bronchoscopy.    I'm going to refer the patient to Dr. Tan for further evaluation.  I spoke with him by phone today.    Additionally, I'm going to start the patient on Breo 100 and see the patient back in one to 2 months with a repeat spirometry.    The patient is continuing to follow with Dr. Locke for her pneumonia.      Ab Lockett MD  01/11/17  12:12 PM    Time: More than 50% of time spent in counseling and coordination of care:  Total face-to-face/floor time 45 min.  Time spent in counseling 35 min. Counseling included the following topics: Going over her diagnosis, expected further evaluation, potential treatments and side effects.    *. Please note that portions of this note were completed with a voice recognition program. Efforts were made to edit the dictations, but occasionally words are mistranscribed.

## 2017-01-11 NOTE — LETTER
January 11, 2017     Don Mora MD  [address] [facility] [address]  [facility] [address]    Patient: Leonarda Benitez   YOB: 1955   Date of Visit: 1/11/2017       Dear Dr. Morgan MD:    Leonarda Benitez was in my office today. Below is a copy of my note.    If you have questions, please do not hesitate to call me. I look forward to following Leonarda along with you.         Sincerely,        Ab Lockett MD        CC: No Recipients    Pulmonary Follow-up      Patient Care Team:  Don Mora MD as PCP - General  Don Mora MD as PCP - Family Medicine    Chief Complaint / Reason: Follow-up of lung mass status post biopsy.      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 She was a direct transfer from her PCP to the hosptital for c/o worsening shortness of breath, cough, fever. Symptom onset x 3 weeks ago. Not improved with cough suppressants or inhalers. Treated with 2 rounds of antibiotics and failed outpatient Levaquin (10 days) for presumed pneumonia (did not get an CXR at first encounter). In the office, she was febrile with temp 101.6, had hypoxia with O2 sats 88% on room air and tachypnea. [facility]. She was started on IV Doxycycline and IV Meropenem, given Solumedrol and duo nebs. She had a chest x-ray which is abnormal and appears to have possible lung mass in right upper lobe. STAT CT angiogram  Was obtained and showed a right upper lobe lung mass, multiple pulmonary nodules and a left lower lobe infiltrate.      I performed a bronchoscopy with endobronchial brushings and biopsies on 12/30/2016. There was very thick mucus plugging of the left lower lobe and was able to extract basically a cast of the bronchial tree in  that area. There is diffuse inflammation but no definite endobronchial tumor in the airways that were examined.    Interval History: I performed a repeat bronchoscopy with EBUS / navigation / radial EBUS on 1/9/16.  Results showed metastatic small cell lung cancer.     The patient reports she is overall feeling better and continues on antibiotics managed by Dr. Locke.  She is going to see him again on Tuesday.  She does still report some left chest pain but it is much better.     History taken from: patient    PMH/FH/Social History were reviewed and updated appropriately in the electronic medical record.     Review of Systems:   Review of Systems  All other systems were reviewed and are negative.  Exceptions are noted in the subjective or above.      Objective     Vital Signs  Temp:  [97.7 °F (36.5 °C)] 97.7 °F (36.5 °C)  Heart Rate:  [98] 98  Resp:  [16] 16  BP: (136)/(88) 136/88       Physical Exam:     Constitutional:    Alert, chronically ill-appearing, cooperative, in no acute distress   Head:    Normocephalic, without obvious abnormality, atraumatic   Eyes:            Lids and lashes normal, conjunctivae and sclerae normal, no   icterus, no pallor, corneas clear, PER   ENMT:   Ears appear intact with no abnormalities noted      No oral lesions   Neck:   No adenopathy, supple, trachea midline, no thyromegaly, no JVD       Lungs/Resp:     Normal effort, symmetric chest rise, no crepitus, mild rhonchi on left                                             Neurologic:    Psychiatric:       Moves all extremities with no obvious focal motor deficit.  Cranial nerves 2 - 12 grossly intact   Oriented x 3, normal affect.             Results Review:     I reviewed the patient's new clinical results.   Pathology results from 1/9/2017 showed small cell carcinoma in right upper lobe endobronchial biopsy.  And small cell carcinoma in right upper lobe transbronchial biopsies and transbronchial needle aspirate.    Imaging:  I  reviewed the patient's new imaging including reviewing the images and radiologist's report.  Imaging reviewed with the patient today.    PFTs: Spirometry done today: FVC 2.78 L or 90% predicted.  FEV1 1.97 L or 83% predicted.  FEV1 to FVC ratio 71%.  Maximal voluntary ventilation 74% predicted.    Interpretation: Borderline obstruction that does not meet criteria for COPD.  Low maximal voluntary ventilation.  No evidence of restriction.  No prior studies available for comparison.    Medication Review:       No current facility-administered medications for this visit.     Assessment/Plan   Problems Addressed this Visit        Respiratory    Shortness of breath - Primary    Relevant Orders    Spirometry Without Bronchodilator (Completed)    Left lower lobe pneumonia (poa)    Relevant Medications    Fluticasone Furoate-Vilanterol (BREO ELLIPTA) 100-25 MCG/INH aerosol powder     Mucopurulent chronic bronchitis    Relevant Medications    Fluticasone Furoate-Vilanterol (BREO ELLIPTA) 100-25 MCG/INH aerosol powder     Small cell carcinoma of right lung    Relevant Medications    Fluticasone Furoate-Vilanterol (BREO ELLIPTA) 100-25 MCG/INH aerosol powder     Other Relevant Orders    Ambulatory Referral to Oncology       Nervous and Auditory    Rib pain on left side        Unfortunate woman found to have metastatic small cell cancer on bronchoscopy.    I'm going to refer the patient to Dr. Tan for further evaluation.  I spoke with him by phone today.    Additionally, I'm going to start the patient on Breo 100 and see the patient back in one to 2 months with a repeat spirometry.    The patient is continuing to follow with Dr. Locke for her pneumonia.      Ab Lockett MD  01/11/17  12:12 PM    Time: More than 50% of time spent in counseling and coordination of care:  Total face-to-face/floor time 45 min.  Time spent in counseling 35 min. Counseling included the following topics: Going over her diagnosis, expected  further evaluation, potential treatments and side effects.    *. Please note that portions of this note were completed with a voice recognition program. Efforts were made to edit the dictations, but occasionally words are mistranscribed.

## 2017-01-13 LAB
GIE STN SPEC: NORMAL

## 2017-01-16 ENCOUNTER — LAB (OUTPATIENT)
Dept: LAB | Facility: HOSPITAL | Age: 62
End: 2017-01-16

## 2017-01-16 DIAGNOSIS — A40.3: Primary | ICD-10-CM

## 2017-01-16 LAB
ALBUMIN SERPL-MCNC: 4 G/DL (ref 3.2–4.8)
ALBUMIN/GLOB SERPL: 1.4 G/DL (ref 1.5–2.5)
ALP SERPL-CCNC: 138 U/L (ref 25–100)
ALT SERPL W P-5'-P-CCNC: 45 U/L (ref 7–40)
ANION GAP SERPL CALCULATED.3IONS-SCNC: 7 MMOL/L (ref 3–11)
AST SERPL-CCNC: 29 U/L (ref 0–33)
BASOPHILS # BLD AUTO: 0.02 10*3/MM3 (ref 0–0.2)
BASOPHILS NFR BLD AUTO: 0.2 % (ref 0–1)
BILIRUB SERPL-MCNC: 0.4 MG/DL (ref 0.3–1.2)
BUN BLD-MCNC: 9 MG/DL (ref 9–23)
BUN/CREAT SERPL: 12.9 (ref 7–25)
CALCIUM SPEC-SCNC: 9.8 MG/DL (ref 8.7–10.4)
CHLORIDE SERPL-SCNC: 105 MMOL/L (ref 99–109)
CO2 SERPL-SCNC: 33 MMOL/L (ref 20–31)
CREAT BLD-MCNC: 0.7 MG/DL (ref 0.6–1.3)
CRP SERPL-MCNC: 22.2 MG/DL (ref 0–10)
DEPRECATED RDW RBC AUTO: 52.5 FL (ref 37–54)
EOSINOPHIL # BLD AUTO: 0.12 10*3/MM3 (ref 0.1–0.3)
EOSINOPHIL NFR BLD AUTO: 1.2 % (ref 0–3)
ERYTHROCYTE [DISTWIDTH] IN BLOOD BY AUTOMATED COUNT: 16.1 % (ref 11.3–14.5)
ERYTHROCYTE [SEDIMENTATION RATE] IN BLOOD: 45 MM/HR (ref 0–30)
GFR SERPL CREATININE-BSD FRML MDRD: 85 ML/MIN/1.73
GLOBULIN UR ELPH-MCNC: 2.8 GM/DL
GLUCOSE BLD-MCNC: 99 MG/DL (ref 70–100)
HCT VFR BLD AUTO: 43 % (ref 34.5–44)
HGB BLD-MCNC: 14.2 G/DL (ref 11.5–15.5)
IMM GRANULOCYTES # BLD: 0.06 10*3/MM3 (ref 0–0.03)
IMM GRANULOCYTES NFR BLD: 0.6 % (ref 0–0.6)
LYMPHOCYTES # BLD AUTO: 1.78 10*3/MM3 (ref 0.6–4.8)
LYMPHOCYTES NFR BLD AUTO: 17.7 % (ref 24–44)
MCH RBC QN AUTO: 29.3 PG (ref 27–31)
MCHC RBC AUTO-ENTMCNC: 33 G/DL (ref 32–36)
MCV RBC AUTO: 88.7 FL (ref 80–99)
MONOCYTES # BLD AUTO: 1.28 10*3/MM3 (ref 0–1)
MONOCYTES NFR BLD AUTO: 12.8 % (ref 0–12)
NEUTROPHILS # BLD AUTO: 6.77 10*3/MM3 (ref 1.5–8.3)
NEUTROPHILS NFR BLD AUTO: 67.5 % (ref 41–71)
PLATELET # BLD AUTO: 381 10*3/MM3 (ref 150–450)
PMV BLD AUTO: 10 FL (ref 6–12)
POTASSIUM BLD-SCNC: 4.2 MMOL/L (ref 3.5–5.5)
PROT SERPL-MCNC: 6.8 G/DL (ref 5.7–8.2)
RBC # BLD AUTO: 4.85 10*6/MM3 (ref 3.89–5.14)
SODIUM BLD-SCNC: 145 MMOL/L (ref 132–146)
WBC NRBC COR # BLD: 10.03 10*3/MM3 (ref 3.5–10.8)

## 2017-01-16 PROCEDURE — 36415 COLL VENOUS BLD VENIPUNCTURE: CPT

## 2017-01-16 PROCEDURE — 86140 C-REACTIVE PROTEIN: CPT | Performed by: INTERNAL MEDICINE

## 2017-01-16 PROCEDURE — 85025 COMPLETE CBC W/AUTO DIFF WBC: CPT | Performed by: INTERNAL MEDICINE

## 2017-01-16 PROCEDURE — 80053 COMPREHEN METABOLIC PANEL: CPT | Performed by: INTERNAL MEDICINE

## 2017-01-16 PROCEDURE — 85652 RBC SED RATE AUTOMATED: CPT | Performed by: INTERNAL MEDICINE

## 2017-01-27 ENCOUNTER — CONSULT (OUTPATIENT)
Dept: ONCOLOGY | Facility: CLINIC | Age: 62
End: 2017-01-27

## 2017-01-27 VITALS
OXYGEN SATURATION: 95 % | TEMPERATURE: 98.1 F | BODY MASS INDEX: 33.31 KG/M2 | DIASTOLIC BLOOD PRESSURE: 83 MMHG | HEIGHT: 63 IN | HEART RATE: 102 BPM | SYSTOLIC BLOOD PRESSURE: 149 MMHG | WEIGHT: 188 LBS | RESPIRATION RATE: 28 BRPM

## 2017-01-27 DIAGNOSIS — C34.91 SMALL CELL CARCINOMA OF RIGHT LUNG (HCC): Primary | ICD-10-CM

## 2017-01-27 PROCEDURE — 99205 OFFICE O/P NEW HI 60 MIN: CPT | Performed by: INTERNAL MEDICINE

## 2017-01-27 RX ORDER — TEMAZEPAM 15 MG/1
15 CAPSULE ORAL NIGHTLY PRN
Qty: 30 CAPSULE | Refills: 2 | OUTPATIENT
Start: 2017-01-27 | End: 2017-02-28 | Stop reason: DRUGHIGH

## 2017-01-27 RX ORDER — LORAZEPAM 1 MG/1
1 TABLET ORAL EVERY 8 HOURS PRN
Qty: 90 TABLET | Refills: 0 | OUTPATIENT
Start: 2017-01-27 | End: 2017-04-25 | Stop reason: SDUPTHER

## 2017-01-27 NOTE — PROGRESS NOTES
DATE OF CONSULTATION: 1/27/2017    REFERRING PHYSICIAN: Don Mora MD    Dear Dr. Don Mora MD  Thank you for asking for my medical advice on this patient. I saw her in the  [place] office on 1/27/2017    REASON FOR CONSULTATION: Small cell lung cancer.      HISTORY OF PRESENT ILLNESS: The patient is a very pleasant 61 y.o.  female   who was in her usual state of health until approximately 1 month ago. The patient presented with cough and shortness of breath. She as treated with two courses of antibiotics, and her symptoms progressed to include fever and hypoxia. She was admitted to this hospital for presumed pneumonia. Chest x-ray was completed that revealed a right upper lobe lung mass, confirmed on CT angiogram with multiple pulmonary nodules and left lower lobe infiltrate. He underwent bronchoscopy with biopsies on 12/30/2016 with pathology revealing small kevin lung cancer. The patient has had a persistent cough, but denies unexplained weight loss. She has a history of smoking, 1/2 per day for 19 years, quit in 2009. She also has a history of cervical cancer in 1982, as well as tonsillar cancer in 2009. Based on these results, the patient was referred to our office for oncology consult.     SUBJECTIVE: When I saw the patient today she continues to complain of some residual cough, as well as left sided pleuritic chest pain. She has had no further fever or chills. She denies hemoptysis or progressive shortness of breath. She has no headaches, blurry vision, or dizziness. Her bowels and bladder are working well. She complains of swelling to her bilateral lower extremities.     Review of Systems   Constitutional: Positive for fatigue. Negative for activity change, appetite change, chills, fever and unexpected weight change.   HENT: Negative for hearing loss, mouth sores, nosebleeds, sore throat and trouble swallowing.    Eyes: Negative for visual disturbance.   Respiratory: Positive for cough and  shortness of breath. Negative for chest tightness and wheezing.    Cardiovascular: Positive for chest pain and leg swelling. Negative for palpitations.   Gastrointestinal: Negative for abdominal distention, abdominal pain, blood in stool, constipation, diarrhea, nausea, rectal pain and vomiting.   Endocrine: Negative for cold intolerance and heat intolerance.   Genitourinary: Negative for difficulty urinating, dysuria, frequency and urgency.   Musculoskeletal: Negative for arthralgias, back pain, gait problem, joint swelling and myalgias.   Skin: Negative for rash.   Neurological: Negative for dizziness, tremors, syncope, weakness, light-headedness, numbness and headaches.   Hematological: Negative for adenopathy. Does not bruise/bleed easily.   Psychiatric/Behavioral: Negative for confusion, sleep disturbance and suicidal ideas. The patient is not nervous/anxious.        Past Medical History   Diagnosis Date   • Arthritis    • Cancer    • COPD (chronic obstructive pulmonary disease)    • Coronary artery disease    • Cough with sputum      WHITE TO CLEAR SPUTUM    • Depression    • Disease of thyroid gland    • Diverticular disease    • Former smoker, stopped smoking many years ago - quit smoking 12/28/2009 12/28/2016   • GERD (gastroesophageal reflux disease) 12/28/2016   • History of cancer tonsil (2009) - treated with radiation 12/28/2016   • Hyperlipemia    • Hypertension      CONTROLLED WITH MEDS PER PT    • Insomnia 12/28/2016   • Peripheral edema      BUE --2 PLUS PITTING EDEMA, UNCHANGED SINCE DC FROM HOSPITAL 1/2/17   • Pneumonia      IV ABX INFUSION DAILY PER PICC AT [facility]    • Seizure      2009--DUE TO ELECTROLYTE IMBALANCE FOLLOWING CHEMO    • Wears dentures      UPPER AND LOWER PARTIAL    • Wears glasses        Social History     Social History   • Marital status:      Spouse name: N/A   • Number of children: N/A   • Years of education: N/A     Occupational History   • Not on file.     Social  "History Main Topics   • Smoking status: Former Smoker     Packs/day: 1.50     Years: 20.00     Types: Cigarettes     Quit date: 12/28/2009   • Smokeless tobacco: Never Used   • Alcohol use No   • Drug use: No   • Sexual activity: Defer     Other Topics Concern   • Not on file     Social History Narrative       Family History   Problem Relation Age of Onset   • Stomach cancer Mother    • Hypertension Sister    • No Known Problems Daughter    • Hypertension Sister    • Lung cancer Neg Hx    • Diabetes Neg Hx        Past Surgical History   Procedure Laterality Date   • Hysterectomy     • Bronchoscopy N/A 12/30/2016     Procedure: BRONCHOSCOPY;  Surgeon: Ab Lockett MD;  Location:  [facility];  Service:    • Colonoscopy  2011   • Bronchoscopy N/A 1/9/2017     Procedure: BRONCHOSCOPY WITH NAVIGATION AND FLUORO;  Surgeon: Ab Lockett MD;  Location:  [facility];  Service:    • Bronchoscopy N/A 1/9/2017     Procedure: BRONCHOSCOPY WITH ENDOBRONCHIAL ULTRASOUND;  Surgeon: Ab Lockett MD;  Location:  [facility];  Service:        Allergies   Allergen Reactions   • Aspirin Itching   • Ciprofloxacin Myalgia     Affects muscles   • Codeine Itching   • Eggs Or Egg-Derived Products      PATIENT STATES \"HISTORY OF ALLERGY TESTING TOLD SHE HAD ALLERGY TO EGGS\"   • Penicillins Hives   • Sulfa Antibiotics Other (See Comments)     Upsets stomach          Current Outpatient Prescriptions:   •  acetaminophen (TYLENOL) 500 MG tablet, Take 650 mg by mouth 3 (Three) Times a Day As Needed for mild pain (1-3)., Disp: , Rfl:   •  amLODIPine (NORVASC) 10 MG tablet, Take 1 tablet by mouth Every Night., Disp: 30 tablet, Rfl: 0  •  atorvastatin (LIPITOR) 40 MG tablet, Take 40 mg by mouth Every Night., Disp: , Rfl:   •  cefTRIAXone (ROCEPHIN) 40 MG/ML IVPB, Infuse 50 mL into a venous catheter Daily. Indications: Bacteria in the Blood, Disp: 50 mL, Rfl: 0  •  citalopram (CeleXA) 10 MG tablet, Take 40 mg by mouth " "Daily., Disp: , Rfl:   •  Fluticasone Furoate-Vilanterol (BREO ELLIPTA) 100-25 MCG/INH aerosol powder , Inhale 1 puff Daily., Disp: 1 each, Rfl: 11  •  furosemide (LASIX) 20 MG tablet, Take 1 tablet by mouth Daily., Disp: 30 tablet, Rfl: 0  •  levothyroxine (SYNTHROID, LEVOTHROID) 88 MCG tablet, Take 88 mcg by mouth Daily., Disp: , Rfl:   •  omeprazole (priLOSEC) 20 MG capsule, Take 20 mg by mouth Daily., Disp: , Rfl:   •  saccharomyces boulardii (FLORASTOR) 250 MG capsule, Take 1 capsule by mouth 2 (Two) Times a Day., Disp: 60 capsule, Rfl: 0  •  zolpidem (AMBIEN) 10 MG tablet, Take 10 mg by mouth Every Night., Disp: , Rfl:     PHYSICAL EXAMINATION:   Visit Vitals   • /83   • Pulse 102   • Temp 98.1 °F (36.7 °C) (Temporal Artery )   • Resp 28   • Ht 63\" (160 cm)   • Wt 188 lb (85.3 kg)   • SpO2 95%   • BMI 33.3 kg/m2      ECOG Performance Status: 1 - Symptomatic but completely ambulatory  General Appearance:  alert, cooperative, no apparent distress and appears stated age   Neurologic/Psychiatric: A&O x 3, gait steady, appropriate affect, strength 5/5 in all muscle groups   HEENT:  Normocephalic, without obvious abnormality, mucous membranes moist   Neck: Supple, symmetrical, trachea midline, no adenopathy;  No thyromegaly, masses, or tenderness   Lungs:   Clear to auscultation bilaterally; respirations regular, even, and unlabored bilaterally   Heart:  Regular rate and rhythm, no murmurs appreciated   Abdomen:   Soft, non-tender, non-distended and no organomegaly   Lymph nodes: No cervical, supraclavicular, inguinal or axillary adenopathy noted   Extremities: Normal, atraumatic; no clubbing, cyanosis, or edema    Skin: No rashes, ulcers, or suspicious lesions noted       Lab on 01/16/2017   Component Date Value Ref Range Status   • Glucose 01/16/2017 99  70 - 100 mg/dL Final   • BUN 01/16/2017 9  9 - 23 mg/dL Final   • Creatinine 01/16/2017 0.70  0.60 - 1.30 mg/dL Final   • Sodium 01/16/2017 145  132 - 146 " mmol/L Final   • Potassium 01/16/2017 4.2  3.5 - 5.5 mmol/L Final   • Chloride 01/16/2017 105  99 - 109 mmol/L Final   • CO2 01/16/2017 33.0* 20.0 - 31.0 mmol/L Final   • Calcium 01/16/2017 9.8  8.7 - 10.4 mg/dL Final   • Total Protein 01/16/2017 6.8  5.7 - 8.2 g/dL Final   • Albumin 01/16/2017 4.00  3.20 - 4.80 g/dL Final   • ALT (SGPT) 01/16/2017 45* 7 - 40 U/L Final   • AST (SGOT) 01/16/2017 29  0 - 33 U/L Final   • Alkaline Phosphatase 01/16/2017 138* 25 - 100 U/L Final   • Total Bilirubin 01/16/2017 0.4  0.3 - 1.2 mg/dL Final   • eGFR Non African Amer 01/16/2017 85  >60 mL/min/1.73 Final   • Globulin 01/16/2017 2.8  gm/dL Final   • A/G Ratio 01/16/2017 1.4* 1.5 - 2.5 g/dL Final   • BUN/Creatinine Ratio 01/16/2017 12.9  7.0 - 25.0 Final   • Anion Gap 01/16/2017 7.0  3.0 - 11.0 mmol/L Final   • Sed Rate 01/16/2017 45* 0 - 30 mm/hr Final   • C-Reactive Protein 01/16/2017 22.20* 0.00 - 10.00 mg/dL Final   • WBC 01/16/2017 10.03  3.50 - 10.80 10*3/mm3 Final   • RBC 01/16/2017 4.85  3.89 - 5.14 10*6/mm3 Final   • Hemoglobin 01/16/2017 14.2  11.5 - 15.5 g/dL Final   • Hematocrit 01/16/2017 43.0  34.5 - 44.0 % Final   • MCV 01/16/2017 88.7  80.0 - 99.0 fL Final   • MCH 01/16/2017 29.3  27.0 - 31.0 pg Final   • MCHC 01/16/2017 33.0  32.0 - 36.0 g/dL Final   • RDW 01/16/2017 16.1* 11.3 - 14.5 % Final   • RDW-SD 01/16/2017 52.5  37.0 - 54.0 fl Final   • MPV 01/16/2017 10.0  6.0 - 12.0 fL Final   • Platelets 01/16/2017 381  150 - 450 10*3/mm3 Final   • Neutrophil % 01/16/2017 67.5  41.0 - 71.0 % Final   • Lymphocyte % 01/16/2017 17.7* 24.0 - 44.0 % Final   • Monocyte % 01/16/2017 12.8* 0.0 - 12.0 % Final   • Eosinophil % 01/16/2017 1.2  0.0 - 3.0 % Final   • Basophil % 01/16/2017 0.2  0.0 - 1.0 % Final   • Immature Grans % 01/16/2017 0.6  0.0 - 0.6 % Final   • Neutrophils, Absolute 01/16/2017 6.77  1.50 - 8.30 10*3/mm3 Final   • Lymphocytes, Absolute 01/16/2017 1.78  0.60 - 4.80 10*3/mm3 Final   • Monocytes, Absolute  01/16/2017 1.28* 0.00 - 1.00 10*3/mm3 Final   • Eosinophils, Absolute 01/16/2017 0.12  0.10 - 0.30 10*3/mm3 Final   • Basophils, Absolute 01/16/2017 0.02  0.00 - 0.20 10*3/mm3 Final   • Immature Grans, Absolute 01/16/2017 0.06* 0.00 - 0.03 10*3/mm3 Final        Ct Abdomen Pelvis Without Contrast    Result Date: 12/30/2016  Narrative: EXAMINATION: CT ABDOMEN PELVIS WO CONTRAST- 12/30/2016:  INDICATION: lung mass/cancer; left lung mass  TECHNIQUE: Multiple axial CT imaging was obtained of the abdomen and pelvis without the administration of intravenous contrast.  The radiation dose reduction device was turned on for each scan per the ALARA (As Low as Reasonably Achievable) protocol.  COMPARISON: CT chest dated 12/28/2016  FINDINGS: Abdomen: Consolidation filling the left lung base. The liver is homogeneous in appearance.  The spleen is unremarkable. Small hiatal hernia. Kidneys and adrenal glands within normal limits. Pancreas is homogeneous. No stones in the gallbladder. No abdominal or retroperitoneal lymphadenopathy. The abdominal portion of the gastrointestinal tract are within normal limits. No free fluid or free air. No abnormal mass or fluid collections identified.  Pelvis: The pelvic organs are unremarkable. The pelvic portion of the gastrointestinal tract are within normal limits. Diverticulosis of the colon without evidence of diverticulitis. Vascular calcifications seen within the abdominal aorta and iliac vessels. Bony structures reveal minimal degenerative changes identified within the spine and pelvis.      Impression: Consolidation seen at the left lung base. There is no acute intra-abdominal or pelvic abnormality present. Diverticulosis without evidence of diverticulitis. Vascular calcifications seen within the abdominal aorta and iliac vessels.  D:  12/30/2016 E:  12/30/2016  This report was finalized on 12/30/2016 4:50 PM by Dr. Neida Hrenandez MD.      Ct Angiogram Chest With & Without  Contrast    Result Date: 12/28/2016  Narrative: EXAM:   CT Angiography Chest With Intravenous Contrast. CLINICAL HISTORY:   61 years old, female; Signs and symptoms; Other: See notes; Additional info: R/O pe and investigate abnormal chest xray TECHNIQUE:   Axial computed tomographic angiography images of the chest with intravenous contrast using pulmonary embolism protocol.  This CT exam was performed using one or more of the following dose reduction techniques:  automated exposure control, adjustment of the mA and/or kV according to patient size, and/or use of iterative reconstruction technique.   MIP reconstructed images were created and reviewed. CONTRAST:   60 mL of Isovue 370 administered intravenously. COMPARISON:   No relevant prior studies available. FINDINGS: Heart size within normal limits. Small pericardial effusion. No enlarged or abnormal appearing mediastinal/hilar lymph nodes identified. No pulmonary emboli identified. There is no evidence of thoracic aortic aneurysm or dissection within the limits imposed by heart motion artifact. Mild emphysematous changes of both lungs. Right upper lobe: Multiple nodules and masses including a 3.9 cm x 2.8 cm mass in the right lung apex on series 4 image 27 and a 1.5 cm x 1.3 cm nodule anteriorly on series 4 image 37. Right middle lobe: Two nodules at the lateral periphery on series 4 image 52. These are approximately the same size. The more anterior measures 1.2 cm x 0.9 cm and demonstrates central cavitation. Right lower lobe: Mild patchy atelectasis scattered throughout. Left upper lobe: Mild patchy groundglass opacity in the left lung apex. There is a 1.0 cm x 0.9 cm nodule laterally on series 4 image 33. Moderate area of airspace consolidation in the lingula. Left lower lobe: Large amount of airspace consolidation. No pneumothorax or pleural effusion. Images of the upper abdomen were reviewed. There is a 1.6 cm cyst exophytic from the left kidney upper pole.  Visualized bones are unremarkable.     Impression: 1. Multiple nodules and masses including a dominant mass in the right lung apex. Findings are strongly concerning for metastatic malignancy. 2. Areas of airspace consolidation in the left upper lobe and left lower lobe, compatible with pneumonia (tumor may be underlying). 3. No pulmonary emboli identified. THIS DOCUMENT HAS BEEN ELECTRONICALLY SIGNED BY PATRICIA HUMPHREY MD    Fl > 1 Hour    Result Date: 1/9/2017  Narrative: Please see performing physicians note for result.    Xr Chest 1 View    Result Date: 1/9/2017  Narrative: EXAMINATION: XR CHEST 1 VW-  INDICATION: Post bronchoscopy, evaluate for pneumothorax; R91.8-Other nonspecific abnormal finding of lung field.  COMPARISON: 01/09/2017.  FINDINGS: Portable chest reveal interval increase seen in the interstitial markings throughout the lung fields bilaterally.  There is a PICC line catheter remaining on the right with tip in the SVC. Possible tiny apical left pneumothorax. Continued followup is recommended. Minimal degenerative change is seen within the spine.         Impression: Possible tiny left apical pneumothorax. Development of increased interstitial markings within the lung fields in the interval. Continued followup is recommended.  D:  01/09/2017 E:  01/09/2017  This report was finalized on 1/9/2017 3:38 PM by Dr. Neida Hernandez MD.      Fl C Arm During Surgery    Result Date: 1/9/2017  Narrative: EXAMINATION: FLUOROSCOPY C-ARM DURING SURGERY-01/09/2017:  INDICATION: Bronchoscopy; R91.8-Other nonspecific abnormal finding of lung field, bronchoscopy.  COMPARISON: NONE.  FINDINGS: One minute and 31 seconds of fluoroscopy and no images used for bronchoscopy. Please see the procedure report for full details.      Impression: Fluoroscopy for bronchoscopy. Please see the procedure report for full details.  D:  01/09/2017 E:  01/09/2017     This report was finalized on 1/9/2017 3:39 PM by Dr. Carrasquillo  MD David.      Xr Chest Pa & Lateral    Result Date: 1/9/2017  Narrative: EXAMINATION: XR CHEST PA AND LATERAL-  INDICATION: Preprocedural navigational bronch, evaluate left lower lobe pneumonia.  COMPARISON: 01/o1/2017.  FINDINGS: PA and lateral views of the chest reveal no change in the masslike density in the right upper lobe.  Increased markings again seen at the left lung base. There is a small left pleural effusion. Cardiac and mediastinal silhouettes are within normal limits. PICC line catheter on the right with tip in the SVC. No pneumothorax. Minimal degenerative change is seen within the spine.         Impression: No pneumothorax with masslike density in the right upper lobe and increased markings at the left lung base. Small left pleural effusion.  D:  01/09/2017 E:  01/09/2017  This report was finalized on 1/9/2017 9:15 AM by Dr. Neida Hernandez MD.      Xr Chest Pa & Lateral    Result Date: 1/2/2017  Narrative:  EXAMINATION: XR CHEST PA AND LATERAL - 01/01/2017  INDICATION:  J18.9-Pneumonia, unspecified organism. Follow-up exam.  COMPARISON: 12/28/2016 two-view chest radiograph.  FINDINGS: Right upper extremity PICC line is now seen, its tip appearing to be in the upper or central right atrium. A 4 cm right upper lobe mass is again noted. Other smaller nodules seen on CT scan are not readily apparent. There is improved aeration of the left lower lobe, previously densely consolidated. The heart is borderline enlarged. The vasculature is mildly cephalized.      Impression: 1. Improving left lower lobe consolidation. 2. Right upper lobe mass as previously described. 3. PICC line tip appears to lie 2 or 3 cm into the right atrium. 4. Mild pulmonary venous hypertension.  DICTATED:     01/01/2017 EDITED:          01/01/2017  This report was finalized on 1/2/2017 12:14 AM by DR. Thang Lion MD.      Xr Chest Pa & Lateral    Result Date: 12/29/2016  Narrative: EXAMINATION: XR CHEST PA AND LATERAL-   INDICATION: Dyspnea, rule out pneumonia.  COMPARISON: 03/07/2008.  FINDINGS: PA and lateral views of the chest reveal masslike density identified in the right upper lobe. This area measures 4.3 cm in its largest dimension. There is consolidation identified within the left lung base. CT scan of the chest is recommended for further evaluation. Degenerative change is seen within the spine. No pleural effusion or pneumothorax.         Impression: Masslike density identified within the right upper lobe with patchy ill-defined opacification at the left lung base. CT scan of the chest is recommended for further evaluation.  D:  12/29/2016 E:  12/29/2016  This report was finalized on 12/29/2016 11:10 AM by Dr. Neida Hernandez MD.          DIAGNOSTIC DATA:   1. Radiology: 12/28/2016 CT angio revealed multiple nodules and masses including a dominant mass in the right lung apex, concerning for metastatic disease. Infiltrate in the left lower lobe, no pulmonary emboli identified. CT Abdomen and pelvis without contrast showing no acute intra-abdominal or pelvic abnormality.   2. Dr. Lockett's note from 1/11/2017 reviewed by me and documented in the  chart.   3. Pathology report: 01/09/2017 Right upper lobe endobronchial biopsy showing small cell carcinoma, station 7 transbronchial needle aspirate showing blood, bronchial cells, and crushed cellular elements, no tumor identified.   4. Laboratory data: 01/10/2017 WBC 22.02, Hgb 13.6, Hct 39.9, Plt 393, Crt 0.6, BUN 10, Ast 29, alt 47, Alk Phos 123, ESR 45, CRP 22.2.     ASSESSMENT: The patient is a very pleasant 61 y.o.  female  with small cell lung cancer.     PROBLEM LIST:   1. Presented with cough, shortness of breath, and pneumonia.   2. Right upper lobe mass with multiple scattered pulmonary nodules bilaterally.   3. Status post bronchoscopy with biopsy revealing small cell lung cancer.   4. COPD  5. History of cervical cancer status post hysterectomy.  6. History of  tonsillar cancer status post chemo/radiation.   7. Hypertension.  8. Anxiety and depression.  9. Hypothyroidism.    PLAN:   1. I had a long discussion today with the patient and her family about her  new diagnosis of lung cancer. I did go over the final pathology report in  detail with both of them. I reviewed the patient's documents including refereing provider's notes, lab results, imaging, and path report.  I reviewed the films myself I went over the CAT scan chest abdomen pelvis pictures with the patient her daughter and her friend.  I am concerned about her contralateral lung nodules as well as left pleural effusion since this will change her stage II extensive stage.  2. We will proceed with staging work up to see if the patient has evidence of limited or extensive stage disease. The patient will have PET scan and MRI brain prior to return.   3. The patient is interested in pursuing active cancer treatment. We will proceed with chemotherapy using platinum/etoposide given IV every 3 weeks. I reviewed side effects of this regimen with the patient and she will receive formal education from our oncology pharmacist.  She is not interested in radiation at this point however she may change her mind.  4. Regarding the role for radiation. This will be based on whether or not the patient has limited or extensive stage disease. If she has limited stage disease, we will refer her to radiation oncology for concurrent radiation therapy.   5. The patient was given a prescription for Zofran to be used as needed for chemotherapy-induced nausea.   6. The patient will continue amlodipine for hypertension.   7. She will continue her Levothyroxine for hypothyroidism.  8. The patient will come back to see us in 1 week to go over the results.     Scribed for Zay Tan MD by Kae Covarrubias, HELEN. 1/27/2017  2:08 PM  Zay Tan MD  1/27/2017   I have reviewed the notes, assessments, and/or procedures performed by Kae Covarrubias  APRN, I concur with her/his documentation of Leonarda Benitez.

## 2017-01-27 NOTE — MR AVS SNAPSHOT
Leonarda Benitez   2017 2:00 PM   Consult    Dept Phone:  [phone]   Encounter #:  [id number]    Provider:  Zay Tan MD   Department:  [facility] GROUP HEMATOLOGY  AND ONCOLOGY                Your Full Care Plan              Your Updated Medication List          This list is accurate as of: 17  2:11 PM.  Always use your most recent med list.                acetaminophen 500 MG tablet   Commonly known as:  TYLENOL       ALEVE PO       AMBIEN 10 MG tablet   Generic drug:  zolpidem       amLODIPine 10 MG tablet   Commonly known as:  NORVASC   Take 1 tablet by mouth Every Night.       atorvastatin 40 MG tablet   Commonly known as:  LIPITOR       citalopram 10 MG tablet   Commonly known as:  CeleXA       Fluticasone Furoate-Vilanterol 100-25 MCG/INH aerosol powder    Commonly known as:  BREO ELLIPTA   Inhale 1 puff Daily.       levothyroxine 88 MCG tablet   Commonly known as:  SYNTHROID, LEVOTHROID       MIRALAX PO       omeprazole 20 MG capsule   Commonly known as:  priLOSEC       saccharomyces boulardii 250 MG capsule   Commonly known as:  FLORASTOR   Take 1 capsule by mouth 2 (Two) Times a Day.               Instructions     None    Patient Instructions History      [facility] Signup     [facility] allows you to send messages to your doctor, view your test results, renew your prescriptions, schedule appointments, and more. To sign up, go to [employer] and click on the Sign Up Now link in the New User? box. Enter your [employer] Activation Code exactly as it appears below along with the last four digits of your Social Security Number and your Date of Birth () to complete the sign-up process. If you do not sign up before the expiration date, you must request a new code.    [employer] Activation Code: [email or web address]  Expires: 2017  5:34 AM    If you have questions, you can email [email or web address] or call [phone] to talk  "to our MyChart staff. Remember, MyChart is NOT to be used for urgent needs. For medical emergencies, dial 911.               Other Info from Your Visit           Your appointments     Date & Time Provider Appointment Department    Feb 02, 2017  9:00 AM EST VINCE [facility] PET ADMIN RM1 NM vince pet injection [facility]    Feb 02, 2017 10:00 AM EST VINCE [facility] PETCT 1 NM vince pet whole body [facility] PET    Feb 02, 2017 11:00 AM EST VINCE [facility] MRI 1 MR vince brain w wo contrast [facility] AT [facility]    Arrive 30 minutes prior to exam time. Bring a list of medications currently taking. Wear clothing without metal snaps, zippers, or other metalic artifacts. Do not wear jewelry.    Feb 03, 2017  3:30 PM EST Zay Tan MD FOLLOW UP [facility] HEMATOLOGY  AND ONCOLOGY    Mar 15, 2017  2:30 PM EDT [facility] 3 Mge Pulmo Critcare Vince Spirometry Pre Post [facility]    Mar 15, 2017  3:00 PM EDT Ab Lockett MD Follow Up [facility]    Arrive 15 minutes prior to appointment.        [facility] AT [facility]  [address] [id number] [phone] [facility]  [place]  [address] [id number] [phone] [facility] HEMATOLOGY  AND ONCOLOGY  [place]  [address]  [facility] [address] [phone]        [facility]  [address]  [facility] [address] [phone]            Vital Signs     Blood Pressure Pulse Temperature Respirations Height Weight    149/83 102 98.1 °F (36.7 °C) (Temporal Artery ) 28 63\" (160 cm) 188 lb (85.3 kg)    Oxygen Saturation Body Mass Index Smoking Status             95% 33.3 kg/m2 Former Smoker         Problems and Diagnoses Noted     Small cell carcinoma of right lung    "

## 2017-02-02 ENCOUNTER — HOSPITAL ENCOUNTER (OUTPATIENT)
Dept: PET IMAGING | Facility: HOSPITAL | Age: 62
Discharge: HOME | End: 2017-02-02

## 2017-02-02 ENCOUNTER — HOSPITAL ENCOUNTER (OUTPATIENT)
Dept: MRI IMAGING | Facility: HOSPITAL | Age: 62
Discharge: HOME | End: 2017-02-02

## 2017-02-02 ENCOUNTER — HOSPITAL ENCOUNTER (OUTPATIENT)
Dept: PET IMAGING | Facility: HOSPITAL | Age: 62
Discharge: HOME | End: 2017-02-02
Admitting: NURSE PRACTITIONER

## 2017-02-02 DIAGNOSIS — C34.91 SMALL CELL CARCINOMA OF RIGHT LUNG (HCC): ICD-10-CM

## 2017-02-02 LAB — GLUCOSE BLDC GLUCOMTR-MCNC: 110 MG/DL (ref 70–130)

## 2017-02-02 PROCEDURE — 0 GADOBENATE DIMEGLUMINE 529 MG/ML SOLUTION: Performed by: NURSE PRACTITIONER

## 2017-02-02 PROCEDURE — 78816 PET IMAGE W/CT FULL BODY: CPT

## 2017-02-02 PROCEDURE — 82962 GLUCOSE BLOOD TEST: CPT

## 2017-02-02 PROCEDURE — A9577 INJ MULTIHANCE: HCPCS | Performed by: NURSE PRACTITIONER

## 2017-02-02 PROCEDURE — A9552 F18 FDG: HCPCS | Performed by: NURSE PRACTITIONER

## 2017-02-02 PROCEDURE — 0 FLUDEOXYGLUCOSE F18 SOLUTION: Performed by: NURSE PRACTITIONER

## 2017-02-02 PROCEDURE — 70553 MRI BRAIN STEM W/O & W/DYE: CPT

## 2017-02-02 RX ADMIN — FLUDEOXYGLUCOSE F18 1 DOSE: 300 INJECTION INTRAVENOUS at 09:01

## 2017-02-02 RX ADMIN — GADOBENATE DIMEGLUMINE 15 ML: 529 INJECTION, SOLUTION INTRAVENOUS at 12:15

## 2017-02-03 ENCOUNTER — OFFICE VISIT (OUTPATIENT)
Dept: ONCOLOGY | Facility: CLINIC | Age: 62
End: 2017-02-03

## 2017-02-03 VITALS
HEIGHT: 63 IN | DIASTOLIC BLOOD PRESSURE: 84 MMHG | HEART RATE: 92 BPM | RESPIRATION RATE: 20 BRPM | SYSTOLIC BLOOD PRESSURE: 126 MMHG | BODY MASS INDEX: 33.31 KG/M2 | WEIGHT: 188 LBS

## 2017-02-03 DIAGNOSIS — C34.90 SCLC (SMALL CELL LUNG CARCINOMA), UNSPECIFIED LATERALITY (HCC): Primary | ICD-10-CM

## 2017-02-03 DIAGNOSIS — C34.91 SMALL CELL CARCINOMA OF RIGHT LUNG (HCC): ICD-10-CM

## 2017-02-03 PROCEDURE — 99215 OFFICE O/P EST HI 40 MIN: CPT | Performed by: INTERNAL MEDICINE

## 2017-02-03 RX ORDER — AMLODIPINE BESYLATE 5 MG/1
5 TABLET ORAL DAILY
Qty: 30 TABLET | Refills: 2 | Status: SHIPPED | OUTPATIENT
Start: 2017-02-03 | End: 2017-05-03 | Stop reason: SDUPTHER

## 2017-02-03 NOTE — PROGRESS NOTES
DATE OF VISIT: 2/3/2017    REASON FOR VISIT: Limited stage small cell lung cancer T2N0M0.       HISTORY OF PRESENT ILLNESS: The patient is a very pleasant 61 y.o. female  with past medical history significant for small cell lung cancer diagnosed 12/30/2016. The patient presented with pneumonia unresponsive to antibiotics. Chest x-ray was completed that revealed a right upper lobe lung mass, confirmed on CT angiogram with multiple pulmonary nodules and left lower lobe infiltrate. He underwent bronchoscopy with biopsies on 12/30/2016 with pathology revealing small kevin lung cancer. The patient had staging work up including PET scan and MRI brain. The patient is here today to go over the results.     SUBJECTIVE: The patient has been doing fairly well. She continues to have some mild cough and shortness of air with activity. She also notes swelling to her lower extremities bilaterally. It is unchanged from prior visits. She denies increased warmth or redness. She has no headaches or blurry vision. She is anxious regarding the results of her scans.     PAST MEDICAL HISTORY/SOCIAL HISTORY/FAMILY HISTORY: Unchanged from my prior documentation done on 01/27/2017.    Review of Systems   Constitutional: Negative for activity change, appetite change, chills, fatigue, fever and unexpected weight change.   HENT: Negative for hearing loss, mouth sores, nosebleeds, sore throat and trouble swallowing.    Eyes: Negative for visual disturbance.   Respiratory: Positive for cough. Negative for chest tightness, shortness of breath and wheezing.    Cardiovascular: Positive for leg swelling. Negative for chest pain and palpitations.   Gastrointestinal: Negative for abdominal distention, abdominal pain, blood in stool, constipation, diarrhea, nausea, rectal pain and vomiting.   Endocrine: Negative for cold intolerance and heat intolerance.   Genitourinary: Negative for difficulty urinating, dysuria, frequency and urgency.   Musculoskeletal:  "Negative for arthralgias, back pain, gait problem, joint swelling and myalgias.   Skin: Negative for rash.   Neurological: Negative for dizziness, tremors, syncope, weakness, light-headedness, numbness and headaches.   Hematological: Negative for adenopathy. Does not bruise/bleed easily.   Psychiatric/Behavioral: Negative for confusion, sleep disturbance and suicidal ideas. The patient is not nervous/anxious.          Current Outpatient Prescriptions:   •  acetaminophen (TYLENOL) 500 MG tablet, Take 650 mg by mouth 3 (Three) Times a Day As Needed for mild pain (1-3)., Disp: , Rfl:   •  amLODIPine (NORVASC) 10 MG tablet, Take 1 tablet by mouth Every Night., Disp: 30 tablet, Rfl: 0  •  atorvastatin (LIPITOR) 40 MG tablet, Take 40 mg by mouth Every Night., Disp: , Rfl:   •  citalopram (CeleXA) 10 MG tablet, Take 40 mg by mouth Daily., Disp: , Rfl:   •  Fluticasone Furoate-Vilanterol (BREO ELLIPTA) 100-25 MCG/INH aerosol powder , Inhale 1 puff Daily., Disp: 1 each, Rfl: 11  •  levothyroxine (SYNTHROID, LEVOTHROID) 88 MCG tablet, Take 88 mcg by mouth Daily., Disp: , Rfl:   •  LORazepam (ATIVAN) 1 MG tablet, Take 1 tablet by mouth Every 8 (Eight) Hours As Needed for anxiety., Disp: 90 tablet, Rfl: 0  •  Naproxen Sodium (ALEVE PO), Take  by mouth., Disp: , Rfl:   •  omeprazole (priLOSEC) 20 MG capsule, Take 20 mg by mouth Daily., Disp: , Rfl:   •  Polyethylene Glycol 3350 (MIRALAX PO), Take  by mouth., Disp: , Rfl:   •  saccharomyces boulardii (FLORASTOR) 250 MG capsule, Take 1 capsule by mouth 2 (Two) Times a Day., Disp: 60 capsule, Rfl: 0  •  temazepam (RESTORIL) 15 MG capsule, Take 1 capsule by mouth At Night As Needed for sleep., Disp: 30 capsule, Rfl: 2  •  zolpidem (AMBIEN) 10 MG tablet, Take 10 mg by mouth Every Night., Disp: , Rfl:     PHYSICAL EXAMINATION:   Visit Vitals   • /84   • Pulse 92   • Resp 20   • Ht 63\" (160 cm)   • Wt 188 lb (85.3 kg)   • BMI 33.3 kg/m2      ECOG Performance Status: 1 - " Symptomatic but completely ambulatory  General Appearance:  alert, cooperative, no apparent distress and appears stated age   Neurologic/Psychiatric: A&O x 3, gait steady, appropriate affect, strength 5/5 in all muscle groups   HEENT:  Normocephalic, without obvious abnormality, mucous membranes moist   Neck: Supple, symmetrical, trachea midline, no adenopathy;  No thyromegaly, masses, or tenderness   Lungs:   Clear to auscultation bilaterally; respirations regular, even, and unlabored bilaterally   Heart:  Regular rate and rhythm, no murmurs appreciated   Abdomen:   Soft, non-tender, non-distended and no organomegaly   Lymph nodes: No cervical, supraclavicular, inguinal or axillary adenopathy noted   Extremities: Normal, atraumatic; no clubbing, cyanosis, or edema    Skin: No rashes, ulcers, or suspicious lesions noted     Hospital Outpatient Visit on 02/02/2017   Component Date Value Ref Range Status   • Glucose 02/02/2017 110  70 - 130 mg/dL Final        Mri Brain With & Without Contrast    Result Date: 2/3/2017  Narrative: EXAMINATION: MRI BRAIN W WO CONTRAST-  INDICATION: Restaging small cell lung cancer; C34.91-Malignant neoplasm of unspecified part of right bronchus or lung.  TECHNIQUE: Routine multiplanar imaging was obtained of the brain pre and post administration of gadolinium contrast.  COMPARISON: None.  FINDINGS: There is some signal changes seen scattered within the periventricular white matter suggesting chronic small vessel ischemic change. There is no evidence of hemorrhage or hydrocephalus. No mass, mass effect, or midline shift. No abnormal extraaxial fluid collection is identified. There is no evidence of restricted diffusion to suggest evidence of an acute ischemic insult. Flow voids are preserved in the major intracranial vessels. The visualized paranasal sinuses are grossly clear. Globes and orbits are intact. The mastoid air cells are patent. Pituitary and sella are unremarkable.  Craniovertebral junction is preserved. No cerebellopontine mass is identified. There is no evidence of abnormal contrast enhancement to suggest evidence of an underlying lesion. Visualized vascularity is unremarkable in appearance.      Impression: Mild chronic changes seen within the brain with no evidence of acute intracranial abnormality identified. No abnormal contrast enhancement to suggest evidence of metastatic disease.   D:  02/02/2017 E:  02/02/2017  This report was finalized on 2/3/2017 11:22 AM by Dr. Neida Hernandez MD.      Fl > 1 Hour    Result Date: 1/9/2017  Narrative: Please see performing physicians note for result.    Xr Chest 1 View    Result Date: 1/9/2017  Narrative: EXAMINATION: XR CHEST 1 VW-  INDICATION: Post bronchoscopy, evaluate for pneumothorax; R91.8-Other nonspecific abnormal finding of lung field.  COMPARISON: 01/09/2017.  FINDINGS: Portable chest reveal interval increase seen in the interstitial markings throughout the lung fields bilaterally.  There is a PICC line catheter remaining on the right with tip in the SVC. Possible tiny apical left pneumothorax. Continued followup is recommended. Minimal degenerative change is seen within the spine.         Impression: Possible tiny left apical pneumothorax. Development of increased interstitial markings within the lung fields in the interval. Continued followup is recommended.  D:  01/09/2017 E:  01/09/2017  This report was finalized on 1/9/2017 3:38 PM by Dr. Neida Hernandez MD.      Fl C Arm During Surgery    Result Date: 1/9/2017  Narrative: EXAMINATION: FLUOROSCOPY C-ARM DURING SURGERY-01/09/2017:  INDICATION: Bronchoscopy; R91.8-Other nonspecific abnormal finding of lung field, bronchoscopy.  COMPARISON: NONE.  FINDINGS: One minute and 31 seconds of fluoroscopy and no images used for bronchoscopy. Please see the procedure report for full details.      Impression: Fluoroscopy for bronchoscopy. Please see the procedure report for  full details.  D:  01/09/2017 E:  01/09/2017     This report was finalized on 1/9/2017 3:39 PM by Dr. Neida Hernandez MD.      Nm Pet Whole Body    Result Date: 2/2/2017  Narrative: EXAMINATION: NUCLEAR MEDICINE PET, WHOLE BODY-02/02/2017:  INDICATION: Staging small cell lung cancer; C34.91-Malignant neoplasm of unspecified part of right bronchus or lung.     TECHNIQUE:  With a fasting baseline blood glucose level of 110, 13.8 mCi of 18 FDG was administered intravenously.  One hour after the administration of radiotracer, a total body fusion PET CT data set was performed including dedicated lower extremity PET CT data sets.  The radiation dose reduction device was turned on as low as reasonably achievable for each scan per ALARA protocol.  COMPARISON: No comparison data sets are available.  FINDINGS: 1. There is a dominant mass lesion in the right upper lobe which is spiculated and measures 4.7 x 4.3 cm. It is intensely pathologically hypermetabolic with a maximum SUV value of 10.88. 2. Hypermetabolic focus or pathologic adenopathy is not identified in the right hilum or in the mediastinum. The axillae are clear. The head and neck data sets are negative for pathologic hypermetabolism. 3. There is a small left base pleural effusion and there is a consolidative airspace opacity with air bronchograms at the left lung base, the contralateral side of the patient's dominant tumor. The maximum SUV value within this consolidative opacity is 3.11. 4. There are fibronodular opacities at both upper lung zones and lung apices which are not pathologically hypermetabolic. There are also scattered nodules noted bilaterally in the mid lungs, not hypermetabolic currently. 5. The data sets through the abdomen, pelvis and retroperitoneum are unremarkable. The data sets of the lower extremities are unremarkable. There are some muscle groups with increased hypermetabolic activity which appear to be physiologic, especially in the  medial right thigh.      Impression: 1.  A dominant mass right upper lobe with highly malignant level SUV values. 2.  Central mediastinal adenopathy or hypermetabolic mass is not identified and there is no hilar lesion. 3.  There is consolidative airspace opacity with air bronchograms left lung base associated with a left pleural effusion and a maximum SUV value of 3.11. 4.  No other distant focus of hypermetabolic activity is identified elsewhere.  D:  02/02/2017 E:  02/02/2017     This report was finalized on 2/2/2017 1:20 PM by Dr. Antoine Benites MD.      Xr Chest Pa & Lateral    Result Date: 1/9/2017  Narrative: EXAMINATION: XR CHEST PA AND LATERAL-  INDICATION: Preprocedural navigational bronch, evaluate left lower lobe pneumonia.  COMPARISON: 01/o1/2017.  FINDINGS: PA and lateral views of the chest reveal no change in the masslike density in the right upper lobe.  Increased markings again seen at the left lung base. There is a small left pleural effusion. Cardiac and mediastinal silhouettes are within normal limits. PICC line catheter on the right with tip in the SVC. No pneumothorax. Minimal degenerative change is seen within the spine.         Impression: No pneumothorax with masslike density in the right upper lobe and increased markings at the left lung base. Small left pleural effusion.  D:  01/09/2017 E:  01/09/2017  This report was finalized on 1/9/2017 9:15 AM by Dr. Neida Hernandez MD.        ASSESSMENT: The patient is a very pleasant 61 y.o. female with small cell lung cancer.     PROBLEM LIST:  1. Presented with cough, shortness of breath, and pneumonia.   2. Right upper lobe mass with multiple scattered pulmonary nodules bilaterally.   3. Status post bronchoscopy with biopsy revealing small cell lung cancer.   4. PET scan done 2/2/2017 shows disease in the right upper lobe with no evidence of distant metastatic disease.   5. MRI brain negative on 2/2/2017  6. COPD  7. History of cervical cancer  status post hysterectomy.  8. History of tonsillar cancer status post chemo/radiation.   9. Hypertension.  10. Anxiety and depression.  11. Hypothyroidism.    PLAN:  1. I reviewed the PET scan report with the patient and her daughter. I reviewed the films myself and with the patient. I told her she has no evidence of distant metastatic disease.   2. The patient is interested in proceeding with active cancer treatment. I recommended concurrent chemotherapy and radiation for limited stage disease.   3. We will refer her to Dr. Cardenas for radiation oncology consult.   4. We will plan to given concurrent chemotherapy with cisplatin/etoposide given IV every 3 weeks. We will plan to begin chemotherapy on 2/8/2017.   5. I reviewed basic side effects of chemotherapy. She will receive formal education from our oncology pharmacist prior to initiating treatment.   6. The patient was given a prescription for Decadron to take twice per day on days with chemotherapy.   7. The patient was given a prescription for Zofran to be used as needed for nausea.    8. We will add Neulasta on-body injector to each cycle for bone marrow support.  9. The patient will come back to see us in 3 weeks for cycle # 2 of treatment and to evaluate treatment tolerance.   10. We will monitor the patient's blood counts, kidney functions, and liver functions throughout treatment.       Zay Tan MD  2/3/2017

## 2017-02-07 DIAGNOSIS — C34.91 SMALL CELL CARCINOMA OF RIGHT LUNG (HCC): ICD-10-CM

## 2017-02-07 RX ORDER — SODIUM CHLORIDE 9 MG/ML
250 INJECTION, SOLUTION INTRAVENOUS ONCE
Status: CANCELLED | OUTPATIENT
Start: 2017-03-01

## 2017-02-07 RX ORDER — SODIUM CHLORIDE 9 MG/ML
250 INJECTION, SOLUTION INTRAVENOUS ONCE
Status: CANCELLED | OUTPATIENT
Start: 2017-04-12

## 2017-02-07 RX ORDER — SODIUM CHLORIDE 9 MG/ML
250 INJECTION, SOLUTION INTRAVENOUS ONCE
Status: CANCELLED | OUTPATIENT
Start: 2017-04-11

## 2017-02-07 RX ORDER — SODIUM CHLORIDE 9 MG/ML
250 INJECTION, SOLUTION INTRAVENOUS ONCE
Status: CANCELLED | OUTPATIENT
Start: 2017-02-10

## 2017-02-07 RX ORDER — PALONOSETRON 0.05 MG/ML
0.25 INJECTION, SOLUTION INTRAVENOUS ONCE
Status: CANCELLED | OUTPATIENT
Start: 2017-03-21

## 2017-02-07 RX ORDER — SODIUM CHLORIDE 9 MG/ML
250 INJECTION, SOLUTION INTRAVENOUS ONCE
Status: CANCELLED | OUTPATIENT
Start: 2017-03-22

## 2017-02-07 RX ORDER — SODIUM CHLORIDE 9 MG/ML
250 INJECTION, SOLUTION INTRAVENOUS ONCE
Status: CANCELLED | OUTPATIENT
Start: 2017-02-09

## 2017-02-07 RX ORDER — PALONOSETRON 0.05 MG/ML
0.25 INJECTION, SOLUTION INTRAVENOUS ONCE
Status: CANCELLED | OUTPATIENT
Start: 2017-02-08

## 2017-02-07 RX ORDER — SODIUM CHLORIDE 9 MG/ML
250 INJECTION, SOLUTION INTRAVENOUS ONCE
Status: CANCELLED | OUTPATIENT
Start: 2017-03-02

## 2017-02-07 RX ORDER — SODIUM CHLORIDE 9 MG/ML
250 INJECTION, SOLUTION INTRAVENOUS ONCE
Status: CANCELLED | OUTPATIENT
Start: 2017-04-13

## 2017-02-07 RX ORDER — PALONOSETRON 0.05 MG/ML
0.25 INJECTION, SOLUTION INTRAVENOUS ONCE
Status: CANCELLED | OUTPATIENT
Start: 2017-04-11

## 2017-02-07 RX ORDER — SODIUM CHLORIDE 9 MG/ML
250 INJECTION, SOLUTION INTRAVENOUS ONCE
Status: CANCELLED | OUTPATIENT
Start: 2017-02-08

## 2017-02-07 RX ORDER — SODIUM CHLORIDE 9 MG/ML
250 INJECTION, SOLUTION INTRAVENOUS ONCE
Status: CANCELLED | OUTPATIENT
Start: 2017-02-28

## 2017-02-07 RX ORDER — PALONOSETRON 0.05 MG/ML
0.25 INJECTION, SOLUTION INTRAVENOUS ONCE
Status: CANCELLED | OUTPATIENT
Start: 2017-02-28

## 2017-02-07 RX ORDER — SODIUM CHLORIDE 9 MG/ML
250 INJECTION, SOLUTION INTRAVENOUS ONCE
Status: CANCELLED | OUTPATIENT
Start: 2017-03-21

## 2017-02-07 RX ORDER — SODIUM CHLORIDE 9 MG/ML
250 INJECTION, SOLUTION INTRAVENOUS ONCE
Status: CANCELLED | OUTPATIENT
Start: 2017-03-23

## 2017-02-08 ENCOUNTER — HOSPITAL ENCOUNTER (OUTPATIENT)
Dept: RADIATION ONCOLOGY | Facility: HOSPITAL | Age: 62
Setting detail: RADIATION/ONCOLOGY SERIES
Discharge: HOME | End: 2017-02-08

## 2017-02-08 ENCOUNTER — INFUSION (OUTPATIENT)
Dept: ONCOLOGY | Facility: HOSPITAL | Age: 62
End: 2017-02-08

## 2017-02-08 ENCOUNTER — OFFICE VISIT (OUTPATIENT)
Dept: RADIATION ONCOLOGY | Facility: HOSPITAL | Age: 62
End: 2017-02-08

## 2017-02-08 ENCOUNTER — TELEPHONE (OUTPATIENT)
Dept: RADIATION ONCOLOGY | Facility: HOSPITAL | Age: 62
End: 2017-02-08

## 2017-02-08 VITALS
TEMPERATURE: 97.8 F | DIASTOLIC BLOOD PRESSURE: 93 MMHG | WEIGHT: 188 LBS | SYSTOLIC BLOOD PRESSURE: 142 MMHG | RESPIRATION RATE: 18 BRPM | BODY MASS INDEX: 33.31 KG/M2 | OXYGEN SATURATION: 98 % | HEIGHT: 63 IN | HEART RATE: 94 BPM

## 2017-02-08 DIAGNOSIS — C34.91 SMALL CELL CARCINOMA OF RIGHT LUNG (HCC): Primary | ICD-10-CM

## 2017-02-08 DIAGNOSIS — C34.11 MALIGNANT NEOPLASM OF UPPER LOBE OF RIGHT LUNG (HCC): Primary | ICD-10-CM

## 2017-02-08 LAB
ALBUMIN SERPL-MCNC: 4.3 G/DL (ref 3.2–4.8)
ALBUMIN/GLOB SERPL: 1.5 G/DL (ref 1.5–2.5)
ALP SERPL-CCNC: 125 U/L (ref 25–100)
ALT SERPL W P-5'-P-CCNC: 33 U/L (ref 7–40)
ANION GAP SERPL CALCULATED.3IONS-SCNC: 8 MMOL/L (ref 3–11)
AST SERPL-CCNC: 31 U/L (ref 0–33)
BILIRUB SERPL-MCNC: 0.6 MG/DL (ref 0.3–1.2)
BUN BLD-MCNC: 5 MG/DL (ref 9–23)
BUN/CREAT SERPL: 7.1 (ref 7–25)
CALCIUM SPEC-SCNC: 10 MG/DL (ref 8.7–10.4)
CHLORIDE SERPL-SCNC: 107 MMOL/L (ref 99–109)
CO2 SERPL-SCNC: 25 MMOL/L (ref 20–31)
CREAT BLD-MCNC: 0.7 MG/DL (ref 0.6–1.3)
CREAT BLDA-MCNC: 0.7 MG/DL (ref 0.6–1.3)
CREAT SERPL-MCNC: 0.7 MG/DL
ERYTHROCYTE [DISTWIDTH] IN BLOOD BY AUTOMATED COUNT: 15.3 % (ref 11.3–14.5)
GFR SERPL CREATININE-BSD FRML MDRD: 85 ML/MIN/1.73
GLOBULIN UR ELPH-MCNC: 2.8 GM/DL
GLUCOSE BLD-MCNC: 136 MG/DL (ref 70–100)
HCT VFR BLD AUTO: 44.7 % (ref 34.5–44)
HGB BLD-MCNC: 15.1 G/DL (ref 11.5–15.5)
LYMPHOCYTES # BLD AUTO: 1.6 10*3/MM3 (ref 0.6–4.8)
LYMPHOCYTES NFR BLD AUTO: 14.7 % (ref 24–44)
MAGNESIUM SERPL-MCNC: 1.9 MG/DL (ref 1.3–2.7)
MCH RBC QN AUTO: 28.6 PG (ref 27–31)
MCHC RBC AUTO-ENTMCNC: 33.7 G/DL (ref 32–36)
MCV RBC AUTO: 85 FL (ref 80–99)
MONOCYTES # BLD AUTO: 0.6 10*3/MM3 (ref 0–1)
MONOCYTES NFR BLD AUTO: 5.2 % (ref 0–12)
NEUTROPHILS # BLD AUTO: 8.9 10*3/MM3 (ref 1.5–8.3)
NEUTROPHILS NFR BLD AUTO: 80.1 % (ref 41–71)
PLATELET # BLD AUTO: 285 10*3/MM3 (ref 150–450)
PMV BLD AUTO: 8.4 FL (ref 6–12)
POTASSIUM BLD-SCNC: 3.1 MMOL/L (ref 3.5–5.5)
PROT SERPL-MCNC: 7.1 G/DL (ref 5.7–8.2)
RBC # BLD AUTO: 5.26 10*6/MM3 (ref 3.89–5.14)
SODIUM BLD-SCNC: 140 MMOL/L (ref 132–146)
WBC NRBC COR # BLD: 11.1 10*3/MM3 (ref 3.5–10.8)

## 2017-02-08 PROCEDURE — 82565 ASSAY OF CREATININE: CPT

## 2017-02-08 PROCEDURE — 80053 COMPREHEN METABOLIC PANEL: CPT | Performed by: INTERNAL MEDICINE

## 2017-02-08 PROCEDURE — 36415 COLL VENOUS BLD VENIPUNCTURE: CPT

## 2017-02-08 PROCEDURE — 83735 ASSAY OF MAGNESIUM: CPT | Performed by: INTERNAL MEDICINE

## 2017-02-08 PROCEDURE — 85025 COMPLETE CBC W/AUTO DIFF WBC: CPT | Performed by: INTERNAL MEDICINE

## 2017-02-08 PROCEDURE — 25010000002 ETOPOSIDE 100 MG/5ML SOLUTION 50 ML VIAL: Performed by: INTERNAL MEDICINE

## 2017-02-08 PROCEDURE — 96413 CHEMO IV INFUSION 1 HR: CPT

## 2017-02-08 RX ORDER — SODIUM CHLORIDE 9 MG/ML
250 INJECTION, SOLUTION INTRAVENOUS ONCE
Status: COMPLETED | OUTPATIENT
Start: 2017-02-08 | End: 2017-02-08

## 2017-02-08 RX ORDER — PALONOSETRON 0.05 MG/ML
0.25 INJECTION, SOLUTION INTRAVENOUS ONCE
Status: DISCONTINUED | OUTPATIENT
Start: 2017-02-08 | End: 2017-02-08

## 2017-02-08 RX ADMIN — ETOPOSIDE 190 MG: 20 INJECTION, SOLUTION, CONCENTRATE INTRAVENOUS at 15:55

## 2017-02-08 RX ADMIN — SODIUM CHLORIDE 250 ML: 9 INJECTION, SOLUTION INTRAVENOUS at 15:56

## 2017-02-08 NOTE — TELEPHONE ENCOUNTER
Pt has not arrived for her appointment.  I called phone number listed and it sent me straight to . I left message inquiring about patient and her appointment.

## 2017-02-08 NOTE — PROGRESS NOTES
"CONSULTATION NOTE    NAME:      Leonarda Benitez  :                                                          1955  DATE OF CONSULTATION:                       17   REQUESTING PHYSICIAN:                   Zay Tan MD  REASON FOR CONSULTATION:           Small cell carcinoma of right lung          Staging form: Lung, AJCC V7            Clinical: Stage IB (T2a, N0, M0)      BRIEF HISTORY:  Leonarda Benitez  is a very pleasant 61 y.o. female  with  small cell lung carcinoma of the lung diagnosed 2016.  She presented with pneumonia and chest x-ray  a right upper lobe lung mass, confirmed on CT angiogram with multiple pulmonary nodules and left lower lobe infiltrate. She underwent bronchoscopy with biopsies on 2016.  Pathology revealed small kevin lung cancer. Staging work up of PET scan and MRI of the brain was negative for metastatic disease. The PET scan showed a dominant mass lesion in the right upper lobe which is  spiculated and measures 4.7 x 4.3 cm. It is intensely pathologically hypermetabolic with a maximum SUV value of 10.88. There was no mediastinal or hilar adenopathy.  There was consolidation in the left lung base and pleural effusion with SUV 3.11.  There were scattered nodules bilaterally in mid lungs, not hypermetabolic.    She has a some mild cough and shortness of air with activity. She has had swelling in the  lower extremities bilaterally that is stable.  She has no headaches, blurred vision, fever, chills, night sweats or weight loss.  She has started chemotherapy per Dr Tan and is here to discuss radiation.    Of note, she has a history of tonsillar and cervical cancer without evidence of recurrence.      Allergies   Allergen Reactions   • Aspirin Itching   • Ciprofloxacin Myalgia     Affects muscles   • Codeine Itching   • Eggs Or Egg-Derived Products      PATIENT STATES \"HISTORY OF ALLERGY TESTING TOLD SHE HAD ALLERGY TO EGGS\"   • Hydrocodone Itching   • Penicillins " Hives   • Sulfa Antibiotics Other (See Comments)     Upsets stomach       Social History   Substance Use Topics   • Smoking status: Former Smoker     Packs/day: 0.50     Years: 20.00     Types: Cigarettes     Quit date: 12/28/2009   • Smokeless tobacco: Never Used   • Alcohol use Yes      Comment: Occ       Past Medical History   Diagnosis Date   • Arthritis    • Cancer    • COPD (chronic obstructive pulmonary disease)    • Coronary artery disease    • Cough with sputum      WHITE TO CLEAR SPUTUM    • Depression    • Disease of thyroid gland    • Diverticular disease    • Former smoker, stopped smoking many years ago - quit smoking 12/28/2009 12/28/2016   • GERD (gastroesophageal reflux disease) 12/28/2016   • History of cancer tonsil (2009) - treated with radiation 12/28/2016   • Hyperlipemia    • Hypertension      CONTROLLED WITH MEDS PER PT    • Insomnia 12/28/2016   • Peripheral edema      BUE --2 PLUS PITTING EDEMA, UNCHANGED SINCE DC FROM HOSPITAL 1/2/17   • Pneumonia      IV ABX INFUSION DAILY PER PICC AT [facility]    • Seizure      2009--DUE TO ELECTROLYTE IMBALANCE FOLLOWING CHEMO    • Wears dentures      UPPER AND LOWER PARTIAL    • Wears glasses        family history includes Heart attack in her father; Hypertension in her sister and sister; No Known Problems in her daughter; Stomach cancer in her mother. There is no history of Lung cancer or Diabetes.     Past Surgical History   Procedure Laterality Date   • Hysterectomy  1982   • Bronchoscopy N/A 12/30/2016     Procedure: BRONCHOSCOPY;  Surgeon: Ab Lockett MD;  Location:  [facility];  Service:    • Colonoscopy  2011   • Bronchoscopy N/A 1/9/2017     Procedure: BRONCHOSCOPY WITH NAVIGATION AND FLUORO;  Surgeon: Ab Lockett MD;  Location:  [facility];  Service:    • Bronchoscopy N/A 1/9/2017     Procedure: BRONCHOSCOPY WITH ENDOBRONCHIAL ULTRASOUND;  Surgeon: Ab Lockett MD;  Location:  [facility];  Service:    • Other surgical  "history  2008     tonsil bx-malignant received radiation      Review of Systems   Constitutional: Positive for fatigue.   Respiratory: Positive for cough and shortness of breath.         Pt has pleurasy pain   Cardiovascular: Positive for leg swelling.   Psychiatric/Behavioral: Positive for depression. The patient is nervous/anxious.    All other systems reviewed and are negative.      Objective   VITAL SIGNS:   Vitals:    02/08/17 1203   BP: 142/93   Pulse: 94   Resp: 18   Temp: 97.8 °F (36.6 °C)   SpO2: 98%   Weight: 188 lb (85.3 kg)   Height: 63\" (160 cm)   PainSc: 0-No pain      KPS        90%    Physical Exam   Constitutional: She is oriented to person, place, and time. She appears well-developed and well-nourished. No distress.   HENT:   Head: Normocephalic and atraumatic.   Eyes: EOM are normal. No scleral icterus.   Neck: Neck supple.   Cardiovascular: Normal rate, regular rhythm and normal heart sounds.  Exam reveals no gallop and no friction rub.    No murmur heard.  Pulmonary/Chest: Effort normal and breath sounds normal.   Abdominal: Soft. She exhibits no distension. There is no tenderness.   Lymphadenopathy:     She has no cervical adenopathy.   Neurological: She is alert and oriented to person, place, and time. No cranial nerve deficit.   Skin: Skin is warm and dry.   Nursing note and vitals reviewed.         The following portions of the patient's history were reviewed and updated as appropriate: allergies, current medications, past family history, past medical history, past social history, past surgical history and problem list.    Assessment        IMPRESSION:  Small cell carcinoma of right lung, Staging form: Lung, AJCC V7, Clinical: Stage IB (T2a, N0, M0)     RECOMMENDATIONS: I recommend twice daily radiotherapy for the small cell lung cancer.  The treatments will be give 6 hours apart daily over 3 weeks.  We will start between the 2nd and 3rd cycles of chemotherapy.  An appointment was made for " her to return for treatment planning.  I anticipate 45 Gy over 3 weeks.      Return for Radiation Treatment/planning.  There are no diagnoses linked to this encounter.            Lesa Cardenas MD      Errors in dictation may reflect use of voice recognition software and not all errors in transcription may have been detected prior to signing.

## 2017-02-09 ENCOUNTER — DOCUMENTATION (OUTPATIENT)
Dept: NUTRITION | Facility: HOSPITAL | Age: 62
End: 2017-02-09

## 2017-02-09 ENCOUNTER — EDUCATION (OUTPATIENT)
Dept: ONCOLOGY | Facility: HOSPITAL | Age: 62
End: 2017-02-09

## 2017-02-09 ENCOUNTER — INFUSION (OUTPATIENT)
Dept: ONCOLOGY | Facility: HOSPITAL | Age: 62
End: 2017-02-09

## 2017-02-09 DIAGNOSIS — C34.91 SMALL CELL CARCINOMA OF RIGHT LUNG (HCC): ICD-10-CM

## 2017-02-09 DIAGNOSIS — C34.91 SMALL CELL CARCINOMA OF RIGHT LUNG (HCC): Primary | ICD-10-CM

## 2017-02-09 PROCEDURE — 96361 HYDRATE IV INFUSION ADD-ON: CPT

## 2017-02-09 PROCEDURE — 96374 THER/PROPH/DIAG INJ IV PUSH: CPT

## 2017-02-09 PROCEDURE — 25010000002 CISPLATIN PER 50 MG: Performed by: INTERNAL MEDICINE

## 2017-02-09 PROCEDURE — 96360 HYDRATION IV INFUSION INIT: CPT

## 2017-02-09 PROCEDURE — 96365 THER/PROPH/DIAG IV INF INIT: CPT

## 2017-02-09 PROCEDURE — 25010000002 FOSAPREPITANT PER 1 MG: Performed by: INTERNAL MEDICINE

## 2017-02-09 PROCEDURE — 25010000002 MAGNESIUM SULFATE PER 500 MG OF MAGNESIUM: Performed by: INTERNAL MEDICINE

## 2017-02-09 PROCEDURE — 96367 TX/PROPH/DG ADDL SEQ IV INF: CPT

## 2017-02-09 PROCEDURE — 96375 TX/PRO/DX INJ NEW DRUG ADDON: CPT

## 2017-02-09 PROCEDURE — 25010000002 PALONOSETRON PER 25 MCG: Performed by: INTERNAL MEDICINE

## 2017-02-09 PROCEDURE — 96417 CHEMO IV INFUS EACH ADDL SEQ: CPT

## 2017-02-09 PROCEDURE — 25010000002 ETOPOSIDE 100 MG/5ML SOLUTION 50 ML VIAL: Performed by: INTERNAL MEDICINE

## 2017-02-09 PROCEDURE — 25010000002 DEXAMETHASONE PER 1 MG: Performed by: INTERNAL MEDICINE

## 2017-02-09 PROCEDURE — 25010000002 POTASSIUM CHLORIDE PER 2 MEQ OF POTASSIUM: Performed by: INTERNAL MEDICINE

## 2017-02-09 PROCEDURE — 96413 CHEMO IV INFUSION 1 HR: CPT

## 2017-02-09 PROCEDURE — 96366 THER/PROPH/DIAG IV INF ADDON: CPT

## 2017-02-09 RX ORDER — PALONOSETRON 0.05 MG/ML
0.25 INJECTION, SOLUTION INTRAVENOUS ONCE
Status: CANCELLED | OUTPATIENT
Start: 2017-02-09

## 2017-02-09 RX ORDER — PALONOSETRON 0.05 MG/ML
0.25 INJECTION, SOLUTION INTRAVENOUS ONCE
Status: COMPLETED | OUTPATIENT
Start: 2017-02-09 | End: 2017-02-09

## 2017-02-09 RX ORDER — SODIUM CHLORIDE 9 MG/ML
250 INJECTION, SOLUTION INTRAVENOUS ONCE
Status: COMPLETED | OUTPATIENT
Start: 2017-02-09 | End: 2017-02-09

## 2017-02-09 RX ADMIN — SODIUM CHLORIDE 250 ML: 9 INJECTION, SOLUTION INTRAVENOUS at 08:46

## 2017-02-09 RX ADMIN — PALONOSETRON HYDROCHLORIDE 0.25 MG: 0.25 INJECTION INTRAVENOUS at 10:36

## 2017-02-09 RX ADMIN — DEXAMETHASONE SODIUM PHOSPHATE 12 MG: 4 INJECTION, SOLUTION INTRAMUSCULAR; INTRAVENOUS at 10:38

## 2017-02-09 RX ADMIN — ETOPOSIDE 190 MG: 20 INJECTION, SOLUTION, CONCENTRATE INTRAVENOUS at 11:18

## 2017-02-09 RX ADMIN — CISPLATIN 142 MG: 100 INJECTION, SOLUTION INTRAVENOUS at 12:30

## 2017-02-09 RX ADMIN — SODIUM CHLORIDE 150 MG: 9 INJECTION, SOLUTION INTRAVENOUS at 10:52

## 2017-02-09 RX ADMIN — POTASSIUM CHLORIDE 1000 ML: 2 INJECTION, SOLUTION, CONCENTRATE INTRAVENOUS at 13:45

## 2017-02-09 RX ADMIN — POTASSIUM CHLORIDE 1000 ML: 2 INJECTION, SOLUTION, CONCENTRATE INTRAVENOUS at 08:46

## 2017-02-09 NOTE — PROGRESS NOTES
Oncology Nutrition Screening    Patient Name:  Leonarda Benitez  YOB: 1955  MRN: [id number]  Date:  02/09/17  Physician:  Dr. Tan    Type of Cancer Treatment:   Radiation/Cyberknife: Consult pending  Chemotherapy:  Cisplatin/-16-every 21 days x 4 cycles    Patient Active Problem List   Diagnosis   • Shortness of breath   • Leukocytosis   • Hyponatremia   • Hypokalemia   • Arthritis   • COPD (chronic obstructive pulmonary disease)   • Coronary artery disease   • Hypothyroidism   • Hyperlipidemia   • Hypertension   • Former smoker, stopped smoking many years ago - quit smoking 12/28/2009   • Left lower lobe pneumonia (poa)   • Mass of upper lobe of right lung   • GERD (gastroesophageal reflux disease)   • Insomnia   • Rib pain on left side   • 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     • amLODIPine (NORVASC) 5 MG tablet Take 1 tablet by mouth Daily. 30 tablet 2   • atorvastatin (LIPITOR) 40 MG tablet Take 40 mg by mouth Every Night.     • citalopram (CeleXA) 10 MG tablet Take 40 mg by mouth Daily.     • Fluticasone Furoate-Vilanterol (BREO ELLIPTA) 100-25 MCG/INH aerosol powder  Inhale 1 puff Daily. 1 each 11   • levothyroxine (SYNTHROID, LEVOTHROID) 88 MCG tablet Take 88 mcg by mouth Daily.     • LORazepam (ATIVAN) 1 MG tablet Take 1 tablet by mouth Every 8 (Eight) Hours As Needed for anxiety. 90 tablet 0   • Naproxen Sodium (ALEVE PO) Take  by mouth.     • omeprazole (priLOSEC) 20 MG capsule Take 20 mg by mouth Daily.     • Polyethylene Glycol 3350 (MIRALAX PO) Take  by mouth.     • saccharomyces boulardii (FLORASTOR) 250 MG capsule Take 1 capsule by mouth 2 (Two) Times a Day. 60 capsule 0   •  temazepam (RESTORIL) 15 MG capsule Take 1 capsule by mouth At Night As Needed for sleep. 30 capsule 2   • zolpidem (AMBIEN) 10 MG tablet Take 10 mg by mouth Every Night.       No current facility-administered medications for this visit.      Facility-Administered Medications Ordered in Other Visits   Medication Dose Route Frequency Provider Last Rate Last Dose   • CISplatin (PLATINOL) 142 mg in sodium chloride 0.9 % 500 mL chemo IVPB  75 mg/m2 (Treatment Plan Recorded) Intravenous Once Zay Tan MD   142 mg at 02/09/17 1230   • sodium chloride 0.9 % 1,000 mL with potassium chloride 20 mEq, magnesium sulfate 1 g infusion  1,000 mL Intravenous Once Zay Tan MD           Glycemic Risk:   n/a    Weight:   Height: 63 inches  Weight: 188 lbs.  Usual Body Weight: same lbs.   BMI: 33.3  Obese  Weight has been stable    Oral Food Intake:  Regular Diet - No Restrictions    Hydration Status:   How many 8 ounce glass of water of fluid do you drink per day?  9    Enteral Feeding:   n/a    Nutrition Symptoms:   Dry Mouth-due to previous XRT @[facility] (h/o tonsil CA)    Activity:   Not my normal self, but able to be up and about with fairly normal activities     reports that she quit smoking about 7 years ago. Her smoking use included Cigarettes. She has a 10.00 pack-year smoking history. She has never used smokeless tobacco. She reports that she drinks alcohol. She reports that she does not use illicit drugs.    Evaluation of Nutritional Risk:   Patient has been identified at nutritional risk due to diagnosis and treatment plan.  Met with patient during her chemotherapy infusion appointment (day 2, cycle 1).  She reports her appetite has been normal; states her weight has been stable; and she denies nutritional complaints at this time.      Discussed the importance of good nutrition during her treatment course focusing on adequate calorie, protein, nutrient and fluid intake.  Encouraged her to be consuming smaller more  frequent meals throughout the day.  Emphasized the importance of protein and its role in the diet; reviewed foods high in protein; and recommended she have a protein source at each meal/snack.  Also emphasized the importance of hydration and recommended she continue drinking at least 64 ounces of water daily.  Reviewed the ACS nutrition booklet and encouraged her to use it for symptom management as needed.      Answered her questions and she voiced understanding of information discussed.  RD's contact information provided and encouraged her to call if further questions arise; she states she will.  Will follow up as needed.  RD available to assist prn. Thanks,      Electronically signed by:  Neida Chinchilla RD  12:42 PM

## 2017-02-10 ENCOUNTER — INFUSION (OUTPATIENT)
Dept: ONCOLOGY | Facility: HOSPITAL | Age: 62
End: 2017-02-10

## 2017-02-10 ENCOUNTER — TELEPHONE (OUTPATIENT)
Dept: ONCOLOGY | Facility: CLINIC | Age: 62
End: 2017-02-10

## 2017-02-10 VITALS
TEMPERATURE: 98.8 F | RESPIRATION RATE: 18 BRPM | SYSTOLIC BLOOD PRESSURE: 150 MMHG | WEIGHT: 190 LBS | DIASTOLIC BLOOD PRESSURE: 90 MMHG | HEIGHT: 63 IN | HEART RATE: 88 BPM | BODY MASS INDEX: 33.66 KG/M2

## 2017-02-10 DIAGNOSIS — C34.91 SMALL CELL CARCINOMA OF RIGHT LUNG (HCC): Primary | ICD-10-CM

## 2017-02-10 DIAGNOSIS — C34.91 SMALL CELL CARCINOMA OF RIGHT LUNG (HCC): ICD-10-CM

## 2017-02-10 PROCEDURE — 96365 THER/PROPH/DIAG IV INF INIT: CPT

## 2017-02-10 PROCEDURE — 96377 APPLICATON ON-BODY INJECTOR: CPT

## 2017-02-10 PROCEDURE — 96413 CHEMO IV INFUSION 1 HR: CPT

## 2017-02-10 PROCEDURE — 96372 THER/PROPH/DIAG INJ SC/IM: CPT

## 2017-02-10 PROCEDURE — 96367 TX/PROPH/DG ADDL SEQ IV INF: CPT

## 2017-02-10 PROCEDURE — 25010000002 ONDANSETRON PER 1 MG: Performed by: INTERNAL MEDICINE

## 2017-02-10 PROCEDURE — 25010000002 PEGFILGRASTIM 6 MG/0.6ML PREFILLED SYRINGE KIT: Performed by: INTERNAL MEDICINE

## 2017-02-10 PROCEDURE — 25010000002 ETOPOSIDE 100 MG/5ML SOLUTION 50 ML VIAL: Performed by: INTERNAL MEDICINE

## 2017-02-10 RX ORDER — DEXAMETHASONE 4 MG/1
4 TABLET ORAL TAKE AS DIRECTED
Qty: 40 TABLET | Refills: 5 | Status: SHIPPED | OUTPATIENT
Start: 2017-02-10 | End: 2017-01-01

## 2017-02-10 RX ORDER — DIPHENHYDRAMINE, LIDOCAINE, NYSTATIN
KIT ORAL
Qty: 240 ML | Refills: 3 | Status: SHIPPED | OUTPATIENT
Start: 2017-02-10 | End: 2017-01-01

## 2017-02-10 RX ORDER — SODIUM CHLORIDE 9 MG/ML
250 INJECTION, SOLUTION INTRAVENOUS ONCE
Status: COMPLETED | OUTPATIENT
Start: 2017-02-10 | End: 2017-02-10

## 2017-02-10 RX ORDER — ONDANSETRON HYDROCHLORIDE 8 MG/1
8 TABLET, FILM COATED ORAL EVERY 8 HOURS PRN
Qty: 30 TABLET | Refills: 5 | Status: SHIPPED | OUTPATIENT
Start: 2017-02-10 | End: 2017-01-01

## 2017-02-10 RX ADMIN — ETOPOSIDE 190 MG: 20 INJECTION, SOLUTION, CONCENTRATE INTRAVENOUS at 14:50

## 2017-02-10 RX ADMIN — ONDANSETRON 12 MG: 2 INJECTION INTRAMUSCULAR; INTRAVENOUS at 14:15

## 2017-02-10 RX ADMIN — PEGFILGRASTIM 6 MG: KIT SUBCUTANEOUS at 16:20

## 2017-02-10 RX ADMIN — SODIUM CHLORIDE 250 ML: 9 INJECTION, SOLUTION INTRAVENOUS at 14:14

## 2017-02-10 NOTE — TELEPHONE ENCOUNTER
----- Message from Flory Cartwright MA sent at 2/10/2017 10:46 AM EST -----  Regarding: [place]- diarrhea, mouth discomfort  Contact: [phone]  Pt c.o diarrhea, vomiting, and mouth discomfort that started yesterday.   Pt will be here today at 1pm for chemo. Pt would like a return call or for someone to come see her while she is here for trx.

## 2017-02-10 NOTE — TELEPHONE ENCOUNTER
zofran and MMW resent to pharmacy. Pt advised she can pick these up at pharmacy, and will add IV zofran to treatment today to help with nausea. May take OTC imodium for diarrhea

## 2017-02-14 ENCOUNTER — APPOINTMENT (OUTPATIENT)
Dept: ONCOLOGY | Facility: HOSPITAL | Age: 62
End: 2017-02-14

## 2017-02-15 ENCOUNTER — APPOINTMENT (OUTPATIENT)
Dept: ONCOLOGY | Facility: HOSPITAL | Age: 62
End: 2017-02-15

## 2017-02-17 ENCOUNTER — APPOINTMENT (OUTPATIENT)
Dept: ONCOLOGY | Facility: HOSPITAL | Age: 62
End: 2017-02-17

## 2017-02-20 ENCOUNTER — APPOINTMENT (OUTPATIENT)
Dept: ONCOLOGY | Facility: HOSPITAL | Age: 62
End: 2017-02-20

## 2017-02-20 LAB
CHYMOTRYP AB SER-ACNC: NORMAL
CHYMOTRYP AB SER-ACNC: NORMAL
FUNGUS WND CULT: NORMAL
NIGHT BLUE STAIN TISS: NORMAL
NIGHT BLUE STAIN TISS: NORMAL

## 2017-02-24 LAB
FUNGUS WND CULT: ABNORMAL
FUNGUS WND CULT: ABNORMAL

## 2017-02-28 ENCOUNTER — OFFICE VISIT (OUTPATIENT)
Dept: ONCOLOGY | Facility: CLINIC | Age: 62
End: 2017-02-28

## 2017-02-28 ENCOUNTER — INFUSION (OUTPATIENT)
Dept: ONCOLOGY | Facility: HOSPITAL | Age: 62
End: 2017-02-28

## 2017-02-28 VITALS
HEART RATE: 74 BPM | TEMPERATURE: 98.7 F | WEIGHT: 188 LBS | BODY MASS INDEX: 33.31 KG/M2 | DIASTOLIC BLOOD PRESSURE: 96 MMHG | HEIGHT: 63 IN | SYSTOLIC BLOOD PRESSURE: 166 MMHG | RESPIRATION RATE: 18 BRPM

## 2017-02-28 DIAGNOSIS — C34.91 SMALL CELL CARCINOMA OF RIGHT LUNG (HCC): Primary | ICD-10-CM

## 2017-02-28 LAB
ALBUMIN SERPL-MCNC: 4 G/DL (ref 3.2–4.8)
ALBUMIN/GLOB SERPL: 1.4 G/DL (ref 1.5–2.5)
ALP SERPL-CCNC: 126 U/L (ref 25–100)
ALT SERPL W P-5'-P-CCNC: 22 U/L (ref 7–40)
ANION GAP SERPL CALCULATED.3IONS-SCNC: 0 MMOL/L (ref 3–11)
AST SERPL-CCNC: 21 U/L (ref 0–33)
BILIRUB SERPL-MCNC: 0.3 MG/DL (ref 0.3–1.2)
BUN BLD-MCNC: 7 MG/DL (ref 9–23)
BUN/CREAT SERPL: 10 (ref 7–25)
CALCIUM SPEC-SCNC: 9.8 MG/DL (ref 8.7–10.4)
CHLORIDE SERPL-SCNC: 103 MMOL/L (ref 99–109)
CO2 SERPL-SCNC: 34 MMOL/L (ref 20–31)
CREAT BLD-MCNC: 0.7 MG/DL (ref 0.6–1.3)
CREAT BLDA-MCNC: 0.7 MG/DL (ref 0.6–1.3)
ERYTHROCYTE [DISTWIDTH] IN BLOOD BY AUTOMATED COUNT: 15.4 % (ref 11.3–14.5)
GFR SERPL CREATININE-BSD FRML MDRD: 85 ML/MIN/1.73
GLOBULIN UR ELPH-MCNC: 2.8 GM/DL
GLUCOSE BLD-MCNC: 104 MG/DL (ref 70–100)
HCT VFR BLD AUTO: 37.9 % (ref 34.5–44)
HGB BLD-MCNC: 12.7 G/DL (ref 11.5–15.5)
LYMPHOCYTES # BLD AUTO: 2.3 10*3/MM3 (ref 0.6–4.8)
LYMPHOCYTES NFR BLD AUTO: 22.9 % (ref 24–44)
MAGNESIUM SERPL-MCNC: 1.7 MG/DL (ref 1.3–2.7)
MCH RBC QN AUTO: 28.5 PG (ref 27–31)
MCHC RBC AUTO-ENTMCNC: 33.5 G/DL (ref 32–36)
MCV RBC AUTO: 84.9 FL (ref 80–99)
MONOCYTES # BLD AUTO: 1.2 10*3/MM3 (ref 0–1)
MONOCYTES NFR BLD AUTO: 12.3 % (ref 0–12)
NEUTROPHILS # BLD AUTO: 6.5 10*3/MM3 (ref 1.5–8.3)
NEUTROPHILS NFR BLD AUTO: 64.8 % (ref 41–71)
PLATELET # BLD AUTO: 391 10*3/MM3 (ref 150–450)
PMV BLD AUTO: 7.7 FL (ref 6–12)
POTASSIUM BLD-SCNC: 4 MMOL/L (ref 3.5–5.5)
PROT SERPL-MCNC: 6.8 G/DL (ref 5.7–8.2)
RBC # BLD AUTO: 4.47 10*6/MM3 (ref 3.89–5.14)
SODIUM BLD-SCNC: 137 MMOL/L (ref 132–146)
WBC NRBC COR # BLD: 10.1 10*3/MM3 (ref 3.5–10.8)

## 2017-02-28 PROCEDURE — 96415 CHEMO IV INFUSION ADDL HR: CPT

## 2017-02-28 PROCEDURE — 36415 COLL VENOUS BLD VENIPUNCTURE: CPT

## 2017-02-28 PROCEDURE — 25010000002 CISPLATIN PER 50 MG: Performed by: INTERNAL MEDICINE

## 2017-02-28 PROCEDURE — 99215 OFFICE O/P EST HI 40 MIN: CPT | Performed by: INTERNAL MEDICINE

## 2017-02-28 PROCEDURE — 96361 HYDRATE IV INFUSION ADD-ON: CPT

## 2017-02-28 PROCEDURE — 96366 THER/PROPH/DIAG IV INF ADDON: CPT

## 2017-02-28 PROCEDURE — 25010000002 FOSAPREPITANT PER 1 MG: Performed by: INTERNAL MEDICINE

## 2017-02-28 PROCEDURE — 96367 TX/PROPH/DG ADDL SEQ IV INF: CPT

## 2017-02-28 PROCEDURE — 25010000002 DEXAMETHASONE PER 1 MG: Performed by: INTERNAL MEDICINE

## 2017-02-28 PROCEDURE — 25010000002 POTASSIUM CHLORIDE PER 2 MEQ OF POTASSIUM: Performed by: INTERNAL MEDICINE

## 2017-02-28 PROCEDURE — 25010000002 ETOPOSIDE 100 MG/5ML SOLUTION 50 ML VIAL: Performed by: INTERNAL MEDICINE

## 2017-02-28 PROCEDURE — 96375 TX/PRO/DX INJ NEW DRUG ADDON: CPT

## 2017-02-28 PROCEDURE — 25010000002 PALONOSETRON PER 25 MCG: Performed by: INTERNAL MEDICINE

## 2017-02-28 PROCEDURE — 96417 CHEMO IV INFUS EACH ADDL SEQ: CPT

## 2017-02-28 PROCEDURE — 25010000002 MAGNESIUM SULFATE PER 500 MG OF MAGNESIUM: Performed by: INTERNAL MEDICINE

## 2017-02-28 PROCEDURE — 96413 CHEMO IV INFUSION 1 HR: CPT

## 2017-02-28 RX ORDER — SODIUM CHLORIDE 9 MG/ML
250 INJECTION, SOLUTION INTRAVENOUS ONCE
Status: COMPLETED | OUTPATIENT
Start: 2017-02-28 | End: 2017-02-28

## 2017-02-28 RX ORDER — TEMAZEPAM 15 MG/1
30 CAPSULE ORAL NIGHTLY PRN
Qty: 60 CAPSULE | Refills: 1 | OUTPATIENT
Start: 2017-02-28 | End: 2017-03-21

## 2017-02-28 RX ORDER — PALONOSETRON 0.05 MG/ML
0.25 INJECTION, SOLUTION INTRAVENOUS ONCE
Status: COMPLETED | OUTPATIENT
Start: 2017-02-28 | End: 2017-02-28

## 2017-02-28 RX ADMIN — DEXAMETHASONE SODIUM PHOSPHATE 12 MG: 4 INJECTION, SOLUTION INTRAMUSCULAR; INTRAVENOUS at 12:54

## 2017-02-28 RX ADMIN — SODIUM CHLORIDE 150 MG: 9 INJECTION, SOLUTION INTRAVENOUS at 12:55

## 2017-02-28 RX ADMIN — PALONOSETRON HYDROCHLORIDE 0.25 MG: 0.25 INJECTION INTRAVENOUS at 12:52

## 2017-02-28 RX ADMIN — ETOPOSIDE 190 MG: 20 INJECTION, SOLUTION, CONCENTRATE INTRAVENOUS at 14:34

## 2017-02-28 RX ADMIN — CISPLATIN 142 MG: 1 INJECTION, SOLUTION INTRAVENOUS at 13:30

## 2017-02-28 RX ADMIN — POTASSIUM CHLORIDE 1000 ML: 2 INJECTION, SOLUTION, CONCENTRATE INTRAVENOUS at 10:53

## 2017-02-28 RX ADMIN — POTASSIUM CHLORIDE 1000 ML: 2 INJECTION, SOLUTION, CONCENTRATE INTRAVENOUS at 15:40

## 2017-02-28 RX ADMIN — SODIUM CHLORIDE 250 ML: 9 INJECTION, SOLUTION INTRAVENOUS at 12:52

## 2017-02-28 NOTE — PROGRESS NOTES
DATE OF VISIT: 2/28/2017    REASON FOR VISIT: Limited stage small cell lung cancer [id number].       HISTORY OF PRESENT ILLNESS: The patient is a very pleasant 61 y.o. female  with past medical history significant for small cell lung cancer diagnosed 12/30/2016. The patient presented with pneumonia unresponsive to antibiotics. Chest x-ray was completed that revealed a right upper lobe lung mass, confirmed on CT angiogram with multiple pulmonary nodules and left lower lobe infiltrate. He underwent bronchoscopy with biopsies on 12/30/2016 with pathology revealing small kevin lung cancer. The patient had staging work up including PET scan and MRI brain that failed to show evidence of distant metastatic disease. The patient was started on definitive treatment with cisplatin/etoposide with radiation on 2/8/2017. The patient is here today for cycle #2 of treatment.     SUBJECTIVE: The patient has been doing fairly well. She tolerated her first cycle of chemotherapy withoutt significant side effects. She had some mild nausea, no vomiting. She also noted some increased fatigue, but this resolved after the first few days. She is eating and drinking well. She has some mild sinus pressure and headache, but not fever, chills, or purulent drainage. She otherwise has continued to be active.     PAST MEDICAL HISTORY/SOCIAL HISTORY/FAMILY HISTORY: Unchanged from my prior documentation done on 01/27/2017.    Review of Systems   Constitutional: Positive for fatigue. Negative for activity change, appetite change, chills, fever and unexpected weight change.   HENT: Positive for sinus pressure. Negative for hearing loss, mouth sores, nosebleeds, sore throat and trouble swallowing.    Eyes: Negative for visual disturbance.   Respiratory: Positive for cough. Negative for chest tightness, shortness of breath and wheezing.    Cardiovascular: Negative for chest pain and palpitations.   Gastrointestinal: Positive for nausea. Negative for abdominal  distention, abdominal pain, blood in stool, constipation, diarrhea, rectal pain and vomiting.   Endocrine: Negative for cold intolerance and heat intolerance.   Genitourinary: Negative for difficulty urinating, dysuria, frequency and urgency.   Musculoskeletal: Negative for arthralgias, back pain, gait problem, joint swelling and myalgias.   Skin: Negative for rash.   Neurological: Negative for dizziness, tremors, syncope, weakness, light-headedness, numbness and headaches.   Hematological: Negative for adenopathy. Does not bruise/bleed easily.   Psychiatric/Behavioral: Negative for confusion, sleep disturbance and suicidal ideas. The patient is not nervous/anxious.          Current Outpatient Prescriptions:   •  acetaminophen (TYLENOL) 500 MG tablet, Take 650 mg by mouth 3 (Three) Times a Day As Needed for mild pain (1-3)., Disp: , Rfl:   •  amLODIPine (NORVASC) 5 MG tablet, Take 1 tablet by mouth Daily., Disp: 30 tablet, Rfl: 2  •  atorvastatin (LIPITOR) 40 MG tablet, Take 40 mg by mouth Every Night., Disp: , Rfl:   •  citalopram (CeleXA) 10 MG tablet, Take 40 mg by mouth Daily., Disp: , Rfl:   •  dexamethasone (DECADRON) 4 MG tablet, Take 1 tablet by mouth Take As Directed. Take 2 tablets in the morning on day 2, then take 2 tablets BID on days 3 and 4 chemo, Disp: 40 tablet, Rfl: 5  •  Fluticasone Furoate-Vilanterol (BREO ELLIPTA) 100-25 MCG/INH aerosol powder , Inhale 1 puff Daily., Disp: 1 each, Rfl: 11  •  levothyroxine (SYNTHROID, LEVOTHROID) 88 MCG tablet, Take 88 mcg by mouth Daily., Disp: , Rfl:   •  LORazepam (ATIVAN) 1 MG tablet, Take 1 tablet by mouth Every 8 (Eight) Hours As Needed for anxiety., Disp: 90 tablet, Rfl: 0  •  Naproxen Sodium (ALEVE PO), Take  by mouth., Disp: , Rfl:   •  nystatin susp + lidocaine viscous (MAGIC MOUTHWASH) oral suspension, 5-10 ml swish and spit or swallow QID prn, Disp: 240 mL, Rfl: 3  •  omeprazole (priLOSEC) 20 MG capsule, Take 20 mg by mouth Daily., Disp: , Rfl:   •   ondansetron (ZOFRAN) 8 MG tablet, Take 1 tablet by mouth Every 8 (Eight) Hours As Needed for nausea or vomiting for up to 60 doses., Disp: 30 tablet, Rfl: 5  •  Polyethylene Glycol 3350 (MIRALAX PO), Take  by mouth., Disp: , Rfl:   •  saccharomyces boulardii (FLORASTOR) 250 MG capsule, Take 1 capsule by mouth 2 (Two) Times a Day., Disp: 60 capsule, Rfl: 0  •  temazepam (RESTORIL) 15 MG capsule, Take 1 capsule by mouth At Night As Needed for sleep., Disp: 30 capsule, Rfl: 2  •  zolpidem (AMBIEN) 10 MG tablet, Take 10 mg by mouth Every Night., Disp: , Rfl:     PHYSICAL EXAMINATION:   There were no vitals taken for this visit.   ECOG Performance Status: 1 - Symptomatic but completely ambulatory  General Appearance:  alert, cooperative, no apparent distress and appears stated age   Neurologic/Psychiatric: A&O x 3, gait steady, appropriate affect, strength 5/5 in all muscle groups   HEENT:  Normocephalic, without obvious abnormality, mucous membranes moist   Neck: Supple, symmetrical, trachea midline, no adenopathy;  No thyromegaly, masses, or tenderness   Lungs:   Clear to auscultation bilaterally; respirations regular, even, and unlabored bilaterally   Heart:  Regular rate and rhythm, no murmurs appreciated   Abdomen:   Soft, non-tender, non-distended and no organomegaly   Lymph nodes: No cervical, supraclavicular, inguinal or axillary adenopathy noted   Extremities: Normal, atraumatic; no clubbing, cyanosis, or edema    Skin: No rashes, ulcers, or suspicious lesions noted     No visits with results within 2 Week(s) from this visit.  Latest known visit with results is:    Infusion on 02/08/2017   Component Date Value Ref Range Status   • Glucose 02/08/2017 136* 70 - 100 mg/dL Final   • BUN 02/08/2017 5* 9 - 23 mg/dL Final   • Creatinine 02/08/2017 0.70  0.60 - 1.30 mg/dL Final   • Sodium 02/08/2017 140  132 - 146 mmol/L Final   • Potassium 02/08/2017 3.1* 3.5 - 5.5 mmol/L Final   • Chloride 02/08/2017 107  99 - 109  mmol/L Final   • CO2 02/08/2017 25.0  20.0 - 31.0 mmol/L Final   • Calcium 02/08/2017 10.0  8.7 - 10.4 mg/dL Final   • Total Protein 02/08/2017 7.1  5.7 - 8.2 g/dL Final   • Albumin 02/08/2017 4.30  3.20 - 4.80 g/dL Final   • ALT (SGPT) 02/08/2017 33  7 - 40 U/L Final   • AST (SGOT) 02/08/2017 31  0 - 33 U/L Final   • Alkaline Phosphatase 02/08/2017 125* 25 - 100 U/L Final   • Total Bilirubin 02/08/2017 0.6  0.3 - 1.2 mg/dL Final   • eGFR Non African Amer 02/08/2017 85  >60 mL/min/1.73 Final   • Globulin 02/08/2017 2.8  gm/dL Final   • A/G Ratio 02/08/2017 1.5  1.5 - 2.5 g/dL Final   • BUN/Creatinine Ratio 02/08/2017 7.1  7.0 - 25.0 Final   • Anion Gap 02/08/2017 8.0  3.0 - 11.0 mmol/L Final   • Magnesium 02/08/2017 1.9  1.3 - 2.7 mg/dL Final   • WBC 02/08/2017 11.10* 3.50 - 10.80 10*3/mm3 Final   • RBC 02/08/2017 5.26* 3.89 - 5.14 10*6/mm3 Final   • Hemoglobin 02/08/2017 15.1  11.5 - 15.5 g/dL Final   • Hematocrit 02/08/2017 44.7* 34.5 - 44.0 % Final   • RDW 02/08/2017 15.3* 11.3 - 14.5 % Final   • MCV 02/08/2017 85.0  80.0 - 99.0 fL Final   • MCH 02/08/2017 28.6  27.0 - 31.0 pg Final   • MCHC 02/08/2017 33.7  32.0 - 36.0 g/dL Final   • MPV 02/08/2017 8.4  6.0 - 12.0 fL Final   • Platelets 02/08/2017 285  150 - 450 10*3/mm3 Final   • Neutrophil % 02/08/2017 80.1* 41.0 - 71.0 % Final   • Lymphocyte % 02/08/2017 14.7* 24.0 - 44.0 % Final   • Monocyte % 02/08/2017 5.2  0.0 - 12.0 % Final   • Neutrophils, Absolute 02/08/2017 8.90* 1.50 - 8.30 10*3/mm3 Final   • Lymphocytes, Absolute 02/08/2017 1.60  0.60 - 4.80 10*3/mm3 Final   • Monocytes, Absolute 02/08/2017 0.60  0.00 - 1.00 10*3/mm3 Final   • Creatinine 02/08/2017 0.7  mg/dL In process   • Creatinine 02/08/2017 0.70  0.60 - 1.30 mg/dL Final    Serial Number: [id number]    : [id number]        Mri Brain With & Without Contrast    Result Date: 2/3/2017  Narrative: EXAMINATION: MRI BRAIN W WO CONTRAST-  INDICATION: Restaging small cell lung cancer; C34.91-Malignant  neoplasm of unspecified part of right bronchus or lung.  TECHNIQUE: Routine multiplanar imaging was obtained of the brain pre and post administration of gadolinium contrast.  COMPARISON: None.  FINDINGS: There is some signal changes seen scattered within the periventricular white matter suggesting chronic small vessel ischemic change. There is no evidence of hemorrhage or hydrocephalus. No mass, mass effect, or midline shift. No abnormal extraaxial fluid collection is identified. There is no evidence of restricted diffusion to suggest evidence of an acute ischemic insult. Flow voids are preserved in the major intracranial vessels. The visualized paranasal sinuses are grossly clear. Globes and orbits are intact. The mastoid air cells are patent. Pituitary and sella are unremarkable. Craniovertebral junction is preserved. No cerebellopontine mass is identified. There is no evidence of abnormal contrast enhancement to suggest evidence of an underlying lesion. Visualized vascularity is unremarkable in appearance.      Impression: Mild chronic changes seen within the brain with no evidence of acute intracranial abnormality identified. No abnormal contrast enhancement to suggest evidence of metastatic disease.   D:  02/02/2017 E:  02/02/2017  This report was finalized on 2/3/2017 11:22 AM by Dr. Neida Hernandez MD.      Nm Pet Whole Body    Result Date: 2/2/2017  Narrative: EXAMINATION: NUCLEAR MEDICINE PET, WHOLE BODY-02/02/2017:  INDICATION: Staging small cell lung cancer; C34.91-Malignant neoplasm of unspecified part of right bronchus or lung.     TECHNIQUE:  With a fasting baseline blood glucose level of 110, 13.8 mCi of 18 FDG was administered intravenously.  One hour after the administration of radiotracer, a total body fusion PET CT data set was performed including dedicated lower extremity PET CT data sets.  The radiation dose reduction device was turned on as low as reasonably achievable for each scan per  ALARA protocol.  COMPARISON: No comparison data sets are available.  FINDINGS: 1. There is a dominant mass lesion in the right upper lobe which is spiculated and measures 4.7 x 4.3 cm. It is intensely pathologically hypermetabolic with a maximum SUV value of 10.88. 2. Hypermetabolic focus or pathologic adenopathy is not identified in the right hilum or in the mediastinum. The axillae are clear. The head and neck data sets are negative for pathologic hypermetabolism. 3. There is a small left base pleural effusion and there is a consolidative airspace opacity with air bronchograms at the left lung base, the contralateral side of the patient's dominant tumor. The maximum SUV value within this consolidative opacity is 3.11. 4. There are fibronodular opacities at both upper lung zones and lung apices which are not pathologically hypermetabolic. There are also scattered nodules noted bilaterally in the mid lungs, not hypermetabolic currently. 5. The data sets through the abdomen, pelvis and retroperitoneum are unremarkable. The data sets of the lower extremities are unremarkable. There are some muscle groups with increased hypermetabolic activity which appear to be physiologic, especially in the medial right thigh.      Impression: 1.  A dominant mass right upper lobe with highly malignant level SUV values. 2.  Central mediastinal adenopathy or hypermetabolic mass is not identified and there is no hilar lesion. 3.  There is consolidative airspace opacity with air bronchograms left lung base associated with a left pleural effusion and a maximum SUV value of 3.11. 4.  No other distant focus of hypermetabolic activity is identified elsewhere.  D:  02/02/2017 E:  02/02/2017     This report was finalized on 2/2/2017 1:20 PM by Dr. Antoine Benites MD.        ASSESSMENT: The patient is a very pleasant 61 y.o. female with small cell lung cancer.     PROBLEM LIST:  1. Presented with cough, shortness of breath, and pneumonia.   2.  Right upper lobe mass with multiple scattered pulmonary nodules bilaterally.   3. Status post bronchoscopy with biopsy revealing small cell lung cancer.   4. PET scan done 2/2/2017 shows disease in the right upper lobe with no evidence of distant metastatic disease. MRI brain negative on 2/2/2017  5.  Started definitive chemotherapy with radiation using cisplatin and etoposide on February 7, 2017.  Status post one cycle.  6. COPD  7. History of cervical cancer status post hysterectomy.  8. History of tonsillar cancer status post chemo/radiation.   9. Hypertension.  10. Anxiety and depression.  11. Hypothyroidism.    PLAN:  1. We will proceed with treatment today, cycle # 2 of cisplatin/etoposide as planned today.   2. She will follow up with Dr. Cardenas for adjuvant radiation therapy. I told the patient the goal of treatment would be for concurrent chemotherapy and radiation.  3. The patient will continue Decadron twice per day on days with chemotherapy.   4. The patient will continue Zofran to be used as needed for nausea.    5. We will continue Neulasta on-body injector to each cycle for bone marrow support.  6. The patient will come back to see us in 3 weeks for cycle # 3 of treatment and to evaluate treatment tolerance.   7. We will monitor the patient's blood counts, kidney functions, and liver functions throughout treatment.   8. The patient was given a prescription for Restoril 30 mg by mouth every night as needed for insomnia.  Restoril 15 mg was not helping enough.  9. We reviewed the potential side effects of this regimen including fatigue, vomiting and nausea, hair loss, nephropathy, neuropathy, hearing loss, myelosuppression, and risk of infusion reaction.      Scribed for Zay Tan MD by HELEN Paige. 2/28/2017  8:57 AM  Zay Tna MD  2/28/2017   I have reviewed the notes, assessments, and/or procedures performed by Ms. Covarrubias, I concur with her/his documentation of Leonarda Benitez.

## 2017-03-01 ENCOUNTER — HOSPITAL ENCOUNTER (OUTPATIENT)
Dept: RADIATION ONCOLOGY | Facility: HOSPITAL | Age: 62
Setting detail: RADIATION/ONCOLOGY SERIES
Discharge: HOME | End: 2017-03-01

## 2017-03-01 ENCOUNTER — INFUSION (OUTPATIENT)
Dept: ONCOLOGY | Facility: HOSPITAL | Age: 62
End: 2017-03-01

## 2017-03-01 ENCOUNTER — HOSPITAL ENCOUNTER (OUTPATIENT)
Dept: RADIATION ONCOLOGY | Facility: HOSPITAL | Age: 62
Discharge: HOME | End: 2017-03-01

## 2017-03-01 VITALS
DIASTOLIC BLOOD PRESSURE: 94 MMHG | WEIGHT: 189 LBS | RESPIRATION RATE: 18 BRPM | TEMPERATURE: 97.9 F | HEIGHT: 63 IN | SYSTOLIC BLOOD PRESSURE: 171 MMHG | HEART RATE: 93 BPM | BODY MASS INDEX: 33.49 KG/M2

## 2017-03-01 DIAGNOSIS — C34.91 SMALL CELL CARCINOMA OF RIGHT LUNG (HCC): Primary | ICD-10-CM

## 2017-03-01 PROCEDURE — 96413 CHEMO IV INFUSION 1 HR: CPT

## 2017-03-01 PROCEDURE — 77290 THER RAD SIMULAJ FIELD CPLX: CPT | Performed by: RADIOLOGY

## 2017-03-01 PROCEDURE — 77280 THER RAD SIMULAJ FIELD SMPL: CPT | Performed by: RADIOLOGY

## 2017-03-01 PROCEDURE — 77470 SPECIAL RADIATION TREATMENT: CPT | Performed by: RADIOLOGY

## 2017-03-01 PROCEDURE — 25010000002 ETOPOSIDE 100 MG/5ML SOLUTION 50 ML VIAL: Performed by: INTERNAL MEDICINE

## 2017-03-01 RX ORDER — PROMETHAZINE HYDROCHLORIDE 25 MG/1
25 TABLET ORAL EVERY 6 HOURS PRN
Qty: 45 TABLET | Refills: 5 | Status: SHIPPED | OUTPATIENT
Start: 2017-03-01 | End: 2017-01-01

## 2017-03-01 RX ORDER — SODIUM CHLORIDE 9 MG/ML
250 INJECTION, SOLUTION INTRAVENOUS ONCE
Status: COMPLETED | OUTPATIENT
Start: 2017-03-01 | End: 2017-03-01

## 2017-03-01 RX ADMIN — SODIUM CHLORIDE 250 ML: 9 INJECTION, SOLUTION INTRAVENOUS at 09:09

## 2017-03-01 RX ADMIN — ETOPOSIDE 190 MG: 20 INJECTION, SOLUTION, CONCENTRATE INTRAVENOUS at 09:10

## 2017-03-02 ENCOUNTER — INFUSION (OUTPATIENT)
Dept: ONCOLOGY | Facility: HOSPITAL | Age: 62
End: 2017-03-02

## 2017-03-02 VITALS
HEIGHT: 63 IN | SYSTOLIC BLOOD PRESSURE: 143 MMHG | BODY MASS INDEX: 33.31 KG/M2 | RESPIRATION RATE: 18 BRPM | HEART RATE: 84 BPM | WEIGHT: 188 LBS | TEMPERATURE: 97.6 F | DIASTOLIC BLOOD PRESSURE: 91 MMHG

## 2017-03-02 DIAGNOSIS — C34.91 SMALL CELL CARCINOMA OF RIGHT LUNG (HCC): Primary | ICD-10-CM

## 2017-03-02 PROCEDURE — 25010000002 PEGFILGRASTIM 6 MG/0.6ML PREFILLED SYRINGE KIT: Performed by: INTERNAL MEDICINE

## 2017-03-02 PROCEDURE — 96413 CHEMO IV INFUSION 1 HR: CPT

## 2017-03-02 PROCEDURE — 25010000002 ETOPOSIDE 100 MG/5ML SOLUTION 50 ML VIAL: Performed by: INTERNAL MEDICINE

## 2017-03-02 PROCEDURE — 96377 APPLICATON ON-BODY INJECTOR: CPT

## 2017-03-02 RX ORDER — SODIUM CHLORIDE 9 MG/ML
250 INJECTION, SOLUTION INTRAVENOUS ONCE
Status: COMPLETED | OUTPATIENT
Start: 2017-03-02 | End: 2017-03-02

## 2017-03-02 RX ADMIN — PEGFILGRASTIM 6 MG: KIT SUBCUTANEOUS at 10:32

## 2017-03-02 RX ADMIN — ETOPOSIDE 190 MG: 20 INJECTION, SOLUTION, CONCENTRATE INTRAVENOUS at 09:29

## 2017-03-02 RX ADMIN — SODIUM CHLORIDE 250 ML: 9 INJECTION, SOLUTION INTRAVENOUS at 09:28

## 2017-03-03 ENCOUNTER — TELEPHONE (OUTPATIENT)
Dept: ONCOLOGY | Facility: CLINIC | Age: 62
End: 2017-03-03

## 2017-03-03 PROCEDURE — 77334 RADIATION TREATMENT AID(S): CPT | Performed by: RADIOLOGY

## 2017-03-03 PROCEDURE — 77295 3-D RADIOTHERAPY PLAN: CPT | Performed by: RADIOLOGY

## 2017-03-03 PROCEDURE — 77300 RADIATION THERAPY DOSE PLAN: CPT | Performed by: RADIOLOGY

## 2017-03-03 NOTE — TELEPHONE ENCOUNTER
----- Message from Kenia Hurley sent at 3/3/2017 10:26 AM EST -----  Regarding: franco hedrick rtn call please  Contact: [phone]  Please call patient concerning appts and treatment.  Pt wants to talk to you about her treatment plan

## 2017-03-06 ENCOUNTER — HOSPITAL ENCOUNTER (OUTPATIENT)
Dept: RADIATION ONCOLOGY | Facility: HOSPITAL | Age: 62
Discharge: HOME | End: 2017-03-06

## 2017-03-06 ENCOUNTER — APPOINTMENT (OUTPATIENT)
Dept: ONCOLOGY | Facility: HOSPITAL | Age: 62
End: 2017-03-06

## 2017-03-06 PROCEDURE — 77280 THER RAD SIMULAJ FIELD SMPL: CPT | Performed by: RADIOLOGY

## 2017-03-07 ENCOUNTER — APPOINTMENT (OUTPATIENT)
Dept: ONCOLOGY | Facility: HOSPITAL | Age: 62
End: 2017-03-07

## 2017-03-07 ENCOUNTER — HOSPITAL ENCOUNTER (OUTPATIENT)
Dept: RADIATION ONCOLOGY | Facility: HOSPITAL | Age: 62
Discharge: HOME | End: 2017-03-07

## 2017-03-07 PROCEDURE — 77336 RADIATION PHYSICS CONSULT: CPT | Performed by: RADIOLOGY

## 2017-03-07 PROCEDURE — 77412 RADIATION TX DELIVERY LVL 3: CPT | Performed by: RADIOLOGY

## 2017-03-07 PROCEDURE — 77387 GUIDANCE FOR RADJ TX DLVR: CPT | Performed by: RADIOLOGY

## 2017-03-08 ENCOUNTER — HOSPITAL ENCOUNTER (OUTPATIENT)
Dept: RADIATION ONCOLOGY | Facility: HOSPITAL | Age: 62
Discharge: HOME | End: 2017-03-08

## 2017-03-08 ENCOUNTER — APPOINTMENT (OUTPATIENT)
Dept: ONCOLOGY | Facility: HOSPITAL | Age: 62
End: 2017-03-08

## 2017-03-08 VITALS — BODY MASS INDEX: 32.77 KG/M2 | WEIGHT: 185 LBS

## 2017-03-08 PROCEDURE — 77412 RADIATION TX DELIVERY LVL 3: CPT | Performed by: RADIOLOGY

## 2017-03-08 PROCEDURE — 77387 GUIDANCE FOR RADJ TX DLVR: CPT | Performed by: RADIOLOGY

## 2017-03-09 PROCEDURE — 77387 GUIDANCE FOR RADJ TX DLVR: CPT | Performed by: RADIOLOGY

## 2017-03-09 PROCEDURE — 77336 RADIATION PHYSICS CONSULT: CPT | Performed by: RADIOLOGY

## 2017-03-09 PROCEDURE — 77412 RADIATION TX DELIVERY LVL 3: CPT | Performed by: RADIOLOGY

## 2017-03-10 ENCOUNTER — HOSPITAL ENCOUNTER (OUTPATIENT)
Dept: RADIATION ONCOLOGY | Facility: HOSPITAL | Age: 62
Discharge: HOME | End: 2017-03-10

## 2017-03-10 PROCEDURE — 77387 GUIDANCE FOR RADJ TX DLVR: CPT | Performed by: RADIOLOGY

## 2017-03-10 PROCEDURE — 77412 RADIATION TX DELIVERY LVL 3: CPT | Performed by: RADIOLOGY

## 2017-03-13 ENCOUNTER — HOSPITAL ENCOUNTER (OUTPATIENT)
Dept: RADIATION ONCOLOGY | Facility: HOSPITAL | Age: 62
Discharge: HOME | End: 2017-03-13

## 2017-03-13 PROCEDURE — 77387 GUIDANCE FOR RADJ TX DLVR: CPT | Performed by: RADIOLOGY

## 2017-03-13 PROCEDURE — 77412 RADIATION TX DELIVERY LVL 3: CPT | Performed by: RADIOLOGY

## 2017-03-14 ENCOUNTER — HOSPITAL ENCOUNTER (OUTPATIENT)
Dept: RADIATION ONCOLOGY | Facility: HOSPITAL | Age: 62
Discharge: HOME | End: 2017-03-14

## 2017-03-14 PROCEDURE — 77336 RADIATION PHYSICS CONSULT: CPT | Performed by: RADIOLOGY

## 2017-03-14 PROCEDURE — 77412 RADIATION TX DELIVERY LVL 3: CPT | Performed by: RADIOLOGY

## 2017-03-14 PROCEDURE — 77387 GUIDANCE FOR RADJ TX DLVR: CPT | Performed by: RADIOLOGY

## 2017-03-15 ENCOUNTER — OFFICE VISIT (OUTPATIENT)
Dept: PULMONOLOGY | Facility: CLINIC | Age: 62
End: 2017-03-15

## 2017-03-15 ENCOUNTER — TELEPHONE (OUTPATIENT)
Dept: ONCOLOGY | Facility: CLINIC | Age: 62
End: 2017-03-15

## 2017-03-15 ENCOUNTER — HOSPITAL ENCOUNTER (OUTPATIENT)
Dept: RADIATION ONCOLOGY | Facility: HOSPITAL | Age: 62
Discharge: HOME | End: 2017-03-15

## 2017-03-15 VITALS
TEMPERATURE: 97.9 F | DIASTOLIC BLOOD PRESSURE: 90 MMHG | SYSTOLIC BLOOD PRESSURE: 132 MMHG | WEIGHT: 189 LBS | BODY MASS INDEX: 33.49 KG/M2 | HEIGHT: 63 IN | HEART RATE: 62 BPM | OXYGEN SATURATION: 96 % | RESPIRATION RATE: 16 BRPM

## 2017-03-15 VITALS — BODY MASS INDEX: 33.13 KG/M2 | WEIGHT: 187 LBS

## 2017-03-15 DIAGNOSIS — R06.02 SHORTNESS OF BREATH: Primary | ICD-10-CM

## 2017-03-15 PROCEDURE — 99211 OFF/OP EST MAY X REQ PHY/QHP: CPT | Performed by: INTERNAL MEDICINE

## 2017-03-15 PROCEDURE — 94010 BREATHING CAPACITY TEST: CPT | Performed by: INTERNAL MEDICINE

## 2017-03-15 PROCEDURE — 77412 RADIATION TX DELIVERY LVL 3: CPT | Performed by: RADIOLOGY

## 2017-03-15 PROCEDURE — 77387 GUIDANCE FOR RADJ TX DLVR: CPT | Performed by: RADIOLOGY

## 2017-03-15 RX ORDER — OMEPRAZOLE 40 MG/1
40 CAPSULE, DELAYED RELEASE ORAL DAILY
Qty: 30 CAPSULE | Refills: 5 | Status: SHIPPED | OUTPATIENT
Start: 2017-03-15 | End: 2017-08-12 | Stop reason: SDUPTHER

## 2017-03-15 NOTE — PROGRESS NOTES
The patient was seen only briefly today.  I reviewed her spirometry which was normal.  I was running 20-30 minutes late and the patient had somewhere she needed to go.  She reports that she is feeling well and receiving chemotherapy and radiation.  She reports her breathing is stable.  She has no current respiratory complaints.  Please see spirometry report.  The patient appeared to be in no distress and was ambulating without difficulty.  Breathing was even and nonlabored.  The patient can follow-up on an as-needed basis.    Vitals:    03/15/17 1444   BP: 132/90   Pulse: 62   Resp: 16   Temp: 97.9 °F (36.6 °C)   SpO2: 96%

## 2017-03-15 NOTE — TELEPHONE ENCOUNTER
VM left for pt advising will send Prilosec to pharmacy for reflux, and may try Claritin for bony pains that could be related to the neulasta. Advised to return call if she had any further questions

## 2017-03-16 ENCOUNTER — HOSPITAL ENCOUNTER (OUTPATIENT)
Dept: RADIATION ONCOLOGY | Facility: HOSPITAL | Age: 62
Discharge: HOME | End: 2017-03-16

## 2017-03-16 PROCEDURE — 77412 RADIATION TX DELIVERY LVL 3: CPT | Performed by: RADIOLOGY

## 2017-03-16 PROCEDURE — 77387 GUIDANCE FOR RADJ TX DLVR: CPT | Performed by: RADIOLOGY

## 2017-03-17 ENCOUNTER — HOSPITAL ENCOUNTER (OUTPATIENT)
Dept: RADIATION ONCOLOGY | Facility: HOSPITAL | Age: 62
Discharge: HOME | End: 2017-03-17

## 2017-03-17 PROCEDURE — 77387 GUIDANCE FOR RADJ TX DLVR: CPT | Performed by: RADIOLOGY

## 2017-03-17 PROCEDURE — 77412 RADIATION TX DELIVERY LVL 3: CPT | Performed by: RADIOLOGY

## 2017-03-20 ENCOUNTER — HOSPITAL ENCOUNTER (OUTPATIENT)
Dept: RADIATION ONCOLOGY | Facility: HOSPITAL | Age: 62
Discharge: HOME | End: 2017-03-20

## 2017-03-20 PROCEDURE — 77387 GUIDANCE FOR RADJ TX DLVR: CPT | Performed by: RADIOLOGY

## 2017-03-20 PROCEDURE — 77412 RADIATION TX DELIVERY LVL 3: CPT | Performed by: RADIOLOGY

## 2017-03-21 ENCOUNTER — OFFICE VISIT (OUTPATIENT)
Dept: ONCOLOGY | Facility: CLINIC | Age: 62
End: 2017-03-21

## 2017-03-21 ENCOUNTER — INFUSION (OUTPATIENT)
Dept: ONCOLOGY | Facility: HOSPITAL | Age: 62
End: 2017-03-21

## 2017-03-21 VITALS
TEMPERATURE: 97.5 F | RESPIRATION RATE: 16 BRPM | WEIGHT: 187 LBS | HEIGHT: 63 IN | BODY MASS INDEX: 33.13 KG/M2 | DIASTOLIC BLOOD PRESSURE: 86 MMHG | HEART RATE: 91 BPM | SYSTOLIC BLOOD PRESSURE: 133 MMHG

## 2017-03-21 DIAGNOSIS — C34.91 SMALL CELL CARCINOMA OF RIGHT LUNG (HCC): Primary | ICD-10-CM

## 2017-03-21 LAB
ALBUMIN SERPL-MCNC: 4.1 G/DL (ref 3.2–4.8)
ALBUMIN/GLOB SERPL: 1.8 G/DL (ref 1.5–2.5)
ALP SERPL-CCNC: 117 U/L (ref 25–100)
ALT SERPL W P-5'-P-CCNC: 24 U/L (ref 7–40)
ANION GAP SERPL CALCULATED.3IONS-SCNC: -1 MMOL/L (ref 3–11)
AST SERPL-CCNC: 27 U/L (ref 0–33)
BILIRUB SERPL-MCNC: 0.3 MG/DL (ref 0.3–1.2)
BUN BLD-MCNC: 12 MG/DL (ref 9–23)
BUN/CREAT SERPL: 17.1 (ref 7–25)
CALCIUM SPEC-SCNC: 9.6 MG/DL (ref 8.7–10.4)
CHLORIDE SERPL-SCNC: 105 MMOL/L (ref 99–109)
CO2 SERPL-SCNC: 35 MMOL/L (ref 20–31)
CREAT BLD-MCNC: 0.7 MG/DL (ref 0.6–1.3)
CREAT BLDA-MCNC: 0.7 MG/DL (ref 0.6–1.3)
ERYTHROCYTE [DISTWIDTH] IN BLOOD BY AUTOMATED COUNT: 19.8 % (ref 11.3–14.5)
GFR SERPL CREATININE-BSD FRML MDRD: 85 ML/MIN/1.73
GLOBULIN UR ELPH-MCNC: 2.3 GM/DL
GLUCOSE BLD-MCNC: 97 MG/DL (ref 70–100)
HCT VFR BLD AUTO: 38.1 % (ref 34.5–44)
HGB BLD-MCNC: 12.4 G/DL (ref 11.5–15.5)
LYMPHOCYTES # BLD AUTO: 1.1 10*3/MM3 (ref 0.6–4.8)
LYMPHOCYTES NFR BLD AUTO: 11.4 % (ref 24–44)
MAGNESIUM SERPL-MCNC: 1.7 MG/DL (ref 1.3–2.7)
MCH RBC QN AUTO: 28.5 PG (ref 27–31)
MCHC RBC AUTO-ENTMCNC: 32.5 G/DL (ref 32–36)
MCV RBC AUTO: 87.9 FL (ref 80–99)
MONOCYTES # BLD AUTO: 0.5 10*3/MM3 (ref 0–1)
MONOCYTES NFR BLD AUTO: 5.2 % (ref 0–12)
NEUTROPHILS # BLD AUTO: 7.8 10*3/MM3 (ref 1.5–8.3)
NEUTROPHILS NFR BLD AUTO: 83.4 % (ref 41–71)
PLATELET # BLD AUTO: 255 10*3/MM3 (ref 150–450)
PMV BLD AUTO: 8.2 FL (ref 6–12)
POTASSIUM BLD-SCNC: 4.7 MMOL/L (ref 3.5–5.5)
PROT SERPL-MCNC: 6.4 G/DL (ref 5.7–8.2)
RBC # BLD AUTO: 4.34 10*6/MM3 (ref 3.89–5.14)
SODIUM BLD-SCNC: 139 MMOL/L (ref 132–146)
WBC NRBC COR # BLD: 9.4 10*3/MM3 (ref 3.5–10.8)

## 2017-03-21 PROCEDURE — 96413 CHEMO IV INFUSION 1 HR: CPT

## 2017-03-21 PROCEDURE — 25010000002 MAGNESIUM SULFATE PER 500 MG OF MAGNESIUM: Performed by: INTERNAL MEDICINE

## 2017-03-21 PROCEDURE — 96368 THER/DIAG CONCURRENT INF: CPT

## 2017-03-21 PROCEDURE — 25010000002 CISPLATIN PER 50 MG: Performed by: INTERNAL MEDICINE

## 2017-03-21 PROCEDURE — 25010000002 ETOPOSIDE 100 MG/5ML SOLUTION 50 ML VIAL: Performed by: INTERNAL MEDICINE

## 2017-03-21 PROCEDURE — 25010000002 DEXAMETHASONE PER 1 MG: Performed by: INTERNAL MEDICINE

## 2017-03-21 PROCEDURE — 96375 TX/PRO/DX INJ NEW DRUG ADDON: CPT

## 2017-03-21 PROCEDURE — 99215 OFFICE O/P EST HI 40 MIN: CPT | Performed by: INTERNAL MEDICINE

## 2017-03-21 PROCEDURE — 25010000002 PALONOSETRON PER 25 MCG: Performed by: INTERNAL MEDICINE

## 2017-03-21 PROCEDURE — 96367 TX/PROPH/DG ADDL SEQ IV INF: CPT

## 2017-03-21 PROCEDURE — 25010000002 FOSAPREPITANT PER 1 MG: Performed by: INTERNAL MEDICINE

## 2017-03-21 PROCEDURE — 25010000002 POTASSIUM CHLORIDE PER 2 MEQ OF POTASSIUM: Performed by: INTERNAL MEDICINE

## 2017-03-21 PROCEDURE — 36415 COLL VENOUS BLD VENIPUNCTURE: CPT

## 2017-03-21 PROCEDURE — 96365 THER/PROPH/DIAG IV INF INIT: CPT

## 2017-03-21 PROCEDURE — 96366 THER/PROPH/DIAG IV INF ADDON: CPT

## 2017-03-21 PROCEDURE — 96417 CHEMO IV INFUS EACH ADDL SEQ: CPT

## 2017-03-21 RX ORDER — PALONOSETRON 0.05 MG/ML
0.25 INJECTION, SOLUTION INTRAVENOUS ONCE
Status: COMPLETED | OUTPATIENT
Start: 2017-03-21 | End: 2017-03-21

## 2017-03-21 RX ORDER — TEMAZEPAM 30 MG/1
CAPSULE ORAL
Refills: 2 | COMMUNITY
Start: 2017-03-17 | End: 2017-04-11 | Stop reason: ALTCHOICE

## 2017-03-21 RX ADMIN — DEXAMETHASONE SODIUM PHOSPHATE 12 MG: 4 INJECTION, SOLUTION INTRAMUSCULAR; INTRAVENOUS at 13:34

## 2017-03-21 RX ADMIN — CISPLATIN 142 MG: 100 INJECTION, SOLUTION INTRAVENOUS at 13:56

## 2017-03-21 RX ADMIN — POTASSIUM CHLORIDE 1000 ML: 2 INJECTION, SOLUTION, CONCENTRATE INTRAVENOUS at 13:59

## 2017-03-21 RX ADMIN — POTASSIUM CHLORIDE 1000 ML: 2 INJECTION, SOLUTION, CONCENTRATE INTRAVENOUS at 11:17

## 2017-03-21 RX ADMIN — ETOPOSIDE 190 MG: 20 INJECTION, SOLUTION, CONCENTRATE INTRAVENOUS at 15:17

## 2017-03-21 RX ADMIN — PALONOSETRON HYDROCHLORIDE 0.25 MG: 0.25 INJECTION INTRAVENOUS at 13:30

## 2017-03-21 RX ADMIN — SODIUM CHLORIDE 150 MG: 9 INJECTION, SOLUTION INTRAVENOUS at 13:34

## 2017-03-21 NOTE — PROGRESS NOTES
DATE OF VISIT: 3/21/2017    REASON FOR VISIT: Limited stage small cell lung cancer T2N0M0.       HISTORY OF PRESENT ILLNESS: The patient is a very pleasant 61 y.o. female  with past medical history significant for small cell lung cancer diagnosed 12/30/2016. The patient presented with pneumonia unresponsive to antibiotics. Chest x-ray was completed that revealed a right upper lobe lung mass, confirmed on CT angiogram with multiple pulmonary nodules and left lower lobe infiltrate. He underwent bronchoscopy with biopsies on 12/30/2016 with pathology revealing small kevin lung cancer. The patient had staging work up including PET scan and MRI brain that failed to show evidence of distant metastatic disease. The patient was started on definitive treatment with cisplatin/etoposide with radiation on 2/8/2017. The patient is here today for cycle #3 of treatment.     SUBJECTIVE: The patient has been doing fairly well. She is tolerating her treatment without significant side effects. She is having increased fatigue. This has been somewhat progressive since adding radiation to her treatment plan. She has no new cough or shortness of breath. She is eating and drinking well. She has some generalized body aches that she is using Aleve with only partial relief in symptoms.     PAST MEDICAL HISTORY/SOCIAL HISTORY/FAMILY HISTORY: Unchanged from my prior documentation done on 01/27/2017.    Review of Systems   Constitutional: Positive for fatigue. Negative for activity change, appetite change, chills, fever and unexpected weight change.   HENT: Positive for sinus pressure. Negative for hearing loss, mouth sores, nosebleeds, sore throat and trouble swallowing.    Eyes: Negative for visual disturbance.   Respiratory: Positive for cough. Negative for chest tightness, shortness of breath and wheezing.    Cardiovascular: Negative for chest pain and palpitations.   Gastrointestinal: Positive for nausea. Negative for abdominal distention,  abdominal pain, blood in stool, constipation, diarrhea, rectal pain and vomiting.   Endocrine: Negative for cold intolerance and heat intolerance.   Genitourinary: Negative for difficulty urinating, dysuria, frequency and urgency.   Musculoskeletal: Negative for arthralgias, back pain, gait problem, joint swelling and myalgias.   Skin: Negative for rash.   Neurological: Negative for dizziness, tremors, syncope, weakness, light-headedness, numbness and headaches.   Hematological: Negative for adenopathy. Does not bruise/bleed easily.   Psychiatric/Behavioral: Negative for confusion, sleep disturbance and suicidal ideas. The patient is not nervous/anxious.          Current Outpatient Prescriptions:   •  acetaminophen (TYLENOL) 500 MG tablet, Take 650 mg by mouth 3 (Three) Times a Day As Needed for mild pain (1-3)., Disp: , Rfl:   •  amLODIPine (NORVASC) 5 MG tablet, Take 1 tablet by mouth Daily., Disp: 30 tablet, Rfl: 2  •  atorvastatin (LIPITOR) 40 MG tablet, Take 40 mg by mouth Every Night., Disp: , Rfl:   •  citalopram (CeleXA) 10 MG tablet, Take 40 mg by mouth Daily., Disp: , Rfl:   •  dexamethasone (DECADRON) 4 MG tablet, Take 1 tablet by mouth Take As Directed. Take 2 tablets in the morning on day 2, then take 2 tablets BID on days 3 and 4 chemo, Disp: 40 tablet, Rfl: 5  •  Fluticasone Furoate-Vilanterol (BREO ELLIPTA) 100-25 MCG/INH aerosol powder , Inhale 1 puff Daily., Disp: 1 each, Rfl: 11  •  levothyroxine (SYNTHROID, LEVOTHROID) 88 MCG tablet, Take 88 mcg by mouth Daily., Disp: , Rfl:   •  LORazepam (ATIVAN) 1 MG tablet, Take 1 tablet by mouth Every 8 (Eight) Hours As Needed for anxiety., Disp: 90 tablet, Rfl: 0  •  Naproxen Sodium (ALEVE PO), Take  by mouth., Disp: , Rfl:   •  nystatin susp + lidocaine viscous (MAGIC MOUTHWASH) oral suspension, 5-10 ml swish and spit or swallow QID prn, Disp: 240 mL, Rfl: 3  •  omeprazole (priLOSEC) 40 MG capsule, Take 1 capsule by mouth Daily., Disp: 30 capsule, Rfl:  5  •  ondansetron (ZOFRAN) 8 MG tablet, Take 1 tablet by mouth Every 8 (Eight) Hours As Needed for nausea or vomiting for up to 60 doses., Disp: 30 tablet, Rfl: 5  •  Polyethylene Glycol 3350 (MIRALAX PO), Take  by mouth., Disp: , Rfl:   •  promethazine (PHENERGAN) 25 MG tablet, Take 1 tablet by mouth Every 6 (Six) Hours As Needed for nausea or vomiting for up to 60 doses., Disp: 45 tablet, Rfl: 5  •  saccharomyces boulardii (FLORASTOR) 250 MG capsule, Take 1 capsule by mouth 2 (Two) Times a Day., Disp: 60 capsule, Rfl: 0  •  temazepam (RESTORIL) 15 MG capsule, Take 2 capsules by mouth At Night As Needed for sleep. Take two capsules qhs PRN sleep, Disp: 60 capsule, Rfl: 1  •  zolpidem (AMBIEN) 10 MG tablet, Take 10 mg by mouth Every Night., Disp: , Rfl:     PHYSICAL EXAMINATION:   There were no vitals taken for this visit.   ECOG Performance Status: 1 - Symptomatic but completely ambulatory  General Appearance:  alert, cooperative, no apparent distress and appears stated age   Neurologic/Psychiatric: A&O x 3, gait steady, appropriate affect, strength 5/5 in all muscle groups   HEENT:  Normocephalic, without obvious abnormality, mucous membranes moist   Neck: Supple, symmetrical, trachea midline, no adenopathy;  No thyromegaly, masses, or tenderness   Lungs:   Clear to auscultation bilaterally; respirations regular, even, and unlabored bilaterally   Heart:  Regular rate and rhythm, no murmurs appreciated   Abdomen:   Soft, non-tender, non-distended and no organomegaly   Lymph nodes: No cervical, supraclavicular, inguinal or axillary adenopathy noted   Extremities: Normal, atraumatic; no clubbing, cyanosis, or edema    Skin: No rashes, ulcers, or suspicious lesions noted     No visits with results within 2 Week(s) from this visit.  Latest known visit with results is:    Infusion on 02/28/2017   Component Date Value Ref Range Status   • Glucose 02/28/2017 104* 70 - 100 mg/dL Final   • BUN 02/28/2017 7* 9 - 23 mg/dL  Final   • Creatinine 02/28/2017 0.70  0.60 - 1.30 mg/dL Final   • Sodium 02/28/2017 137  132 - 146 mmol/L Final   • Potassium 02/28/2017 4.0  3.5 - 5.5 mmol/L Final   • Chloride 02/28/2017 103  99 - 109 mmol/L Final   • CO2 02/28/2017 34.0* 20.0 - 31.0 mmol/L Final   • Calcium 02/28/2017 9.8  8.7 - 10.4 mg/dL Final   • Total Protein 02/28/2017 6.8  5.7 - 8.2 g/dL Final   • Albumin 02/28/2017 4.00  3.20 - 4.80 g/dL Final   • ALT (SGPT) 02/28/2017 22  7 - 40 U/L Final   • AST (SGOT) 02/28/2017 21  0 - 33 U/L Final   • Alkaline Phosphatase 02/28/2017 126* 25 - 100 U/L Final   • Total Bilirubin 02/28/2017 0.3  0.3 - 1.2 mg/dL Final   • eGFR Non African Amer 02/28/2017 85  >60 mL/min/1.73 Final   • Globulin 02/28/2017 2.8  gm/dL Final   • A/G Ratio 02/28/2017 1.4* 1.5 - 2.5 g/dL Final   • BUN/Creatinine Ratio 02/28/2017 10.0  7.0 - 25.0 Final   • Anion Gap 02/28/2017 0.0* 3.0 - 11.0 mmol/L Final   • Magnesium 02/28/2017 1.7  1.3 - 2.7 mg/dL Final   • WBC 02/28/2017 10.10  3.50 - 10.80 10*3/mm3 Final   • RBC 02/28/2017 4.47  3.89 - 5.14 10*6/mm3 Final   • Hemoglobin 02/28/2017 12.7  11.5 - 15.5 g/dL Final   • Hematocrit 02/28/2017 37.9  34.5 - 44.0 % Final   • RDW 02/28/2017 15.4* 11.3 - 14.5 % Final   • MCV 02/28/2017 84.9  80.0 - 99.0 fL Final   • MCH 02/28/2017 28.5  27.0 - 31.0 pg Final   • MCHC 02/28/2017 33.5  32.0 - 36.0 g/dL Final   • MPV 02/28/2017 7.7  6.0 - 12.0 fL Final   • Platelets 02/28/2017 391  150 - 450 10*3/mm3 Final   • Neutrophil % 02/28/2017 64.8  41.0 - 71.0 % Final   • Lymphocyte % 02/28/2017 22.9* 24.0 - 44.0 % Final   • Monocyte % 02/28/2017 12.3* 0.0 - 12.0 % Final   • Neutrophils, Absolute 02/28/2017 6.50  1.50 - 8.30 10*3/mm3 Final   • Lymphocytes, Absolute 02/28/2017 2.30  0.60 - 4.80 10*3/mm3 Final   • Monocytes, Absolute 02/28/2017 1.20* 0.00 - 1.00 10*3/mm3 Final   • Creatinine 02/28/2017 0.70  0.60 - 1.30 mg/dL Final    Serial Number: [id number]    : [id number]        No results  found.    ASSESSMENT: The patient is a very pleasant 61 y.o. female with small cell lung cancer.     PROBLEM LIST:  1. Presented with cough, shortness of breath, and pneumonia.   2. Right upper lobe mass with multiple scattered pulmonary nodules bilaterally.   3. Status post bronchoscopy with biopsy revealing small cell lung cancer.   4. PET scan done 2/2/2017 shows disease in the right upper lobe with no evidence of distant metastatic disease. MRI brain negative on 2/2/2017  5.  Started definitive chemotherapy with radiation using cisplatin and etoposide on February 7, 2017.  Status post 2 cycles.  6. COPD  7. History of cervical cancer status post hysterectomy.  8. History of tonsillar cancer status post chemo/radiation.   9. Hypertension.  10. Anxiety and depression.  11. Hypothyroidism.    PLAN:  1. We will proceed with treatment today, cycle # 3 of cisplatin/etoposide as planned today.   2. The patient will continue radiation therapy under the care of Dr. Cardenas. She is receiving adjuvant radiation daily.  The patient declined twice a day schedule.  3. The patient will continue Decadron twice per day on days with chemotherapy.   4. The patient will continue Zofran to be used as needed for nausea.    5. We will continue Neulasta on-body injector to each cycle for bone marrow support.  6. The patient will come back to see us in 3 weeks for cycle # 4 of treatment and to evaluate treatment tolerance.   7. We will monitor the patient's blood counts, kidney functions, and liver functions throughout treatment.   8. The patient will continue Restoril 30 mg by mouth every night as needed for insomnia.   9. We reviewed the potential side effects of this regimen including fatigue, vomiting and nausea, hair loss, nephropathy, neuropathy, hearing loss, myelosuppression, and risk of infusion reaction.  10.  Regarding her right thigh lesion this is a stye I told the patient to watch it for now she does not need eyedrops.  The  patient was advised to do warm compression's twice a day.    Scribed for Zay Tan MD by Kae Covarrubias, APRN. 3/21/2017  8:22 AM  Zay Tan MD  3/21/2017   I, Zay Tan MD, personally performed the services described in this documentation as scribed by the above named individual in my presence, and it is both accurate and complete.  3/21/2017  10:26 AM

## 2017-03-22 ENCOUNTER — HOSPITAL ENCOUNTER (OUTPATIENT)
Dept: RADIATION ONCOLOGY | Facility: HOSPITAL | Age: 62
Discharge: HOME | End: 2017-03-22

## 2017-03-22 ENCOUNTER — INFUSION (OUTPATIENT)
Dept: ONCOLOGY | Facility: HOSPITAL | Age: 62
End: 2017-03-22

## 2017-03-22 VITALS
SYSTOLIC BLOOD PRESSURE: 159 MMHG | DIASTOLIC BLOOD PRESSURE: 95 MMHG | HEIGHT: 63 IN | TEMPERATURE: 97.4 F | HEART RATE: 97 BPM | BODY MASS INDEX: 33.31 KG/M2 | WEIGHT: 188 LBS | RESPIRATION RATE: 16 BRPM

## 2017-03-22 VITALS — WEIGHT: 188.8 LBS | BODY MASS INDEX: 33.44 KG/M2

## 2017-03-22 DIAGNOSIS — C34.91 SMALL CELL CARCINOMA OF RIGHT LUNG (HCC): Primary | ICD-10-CM

## 2017-03-22 PROCEDURE — 25010000002 ETOPOSIDE 100 MG/5ML SOLUTION 50 ML VIAL: Performed by: INTERNAL MEDICINE

## 2017-03-22 PROCEDURE — 77412 RADIATION TX DELIVERY LVL 3: CPT | Performed by: RADIOLOGY

## 2017-03-22 PROCEDURE — 77387 GUIDANCE FOR RADJ TX DLVR: CPT | Performed by: RADIOLOGY

## 2017-03-22 PROCEDURE — 96413 CHEMO IV INFUSION 1 HR: CPT

## 2017-03-22 PROCEDURE — 77336 RADIATION PHYSICS CONSULT: CPT | Performed by: RADIOLOGY

## 2017-03-22 RX ADMIN — ETOPOSIDE 190 MG: 20 INJECTION, SOLUTION, CONCENTRATE INTRAVENOUS at 10:30

## 2017-03-23 ENCOUNTER — INFUSION (OUTPATIENT)
Dept: ONCOLOGY | Facility: HOSPITAL | Age: 62
End: 2017-03-23

## 2017-03-23 ENCOUNTER — HOSPITAL ENCOUNTER (OUTPATIENT)
Dept: RADIATION ONCOLOGY | Facility: HOSPITAL | Age: 62
Discharge: HOME | End: 2017-03-23

## 2017-03-23 VITALS
BODY MASS INDEX: 33.31 KG/M2 | SYSTOLIC BLOOD PRESSURE: 144 MMHG | HEIGHT: 63 IN | WEIGHT: 188 LBS | TEMPERATURE: 97.9 F | DIASTOLIC BLOOD PRESSURE: 87 MMHG | RESPIRATION RATE: 16 BRPM | HEART RATE: 77 BPM

## 2017-03-23 DIAGNOSIS — C34.91 SMALL CELL CARCINOMA OF RIGHT LUNG (HCC): Primary | ICD-10-CM

## 2017-03-23 PROCEDURE — 77412 RADIATION TX DELIVERY LVL 3: CPT | Performed by: RADIOLOGY

## 2017-03-23 PROCEDURE — 77387 GUIDANCE FOR RADJ TX DLVR: CPT | Performed by: RADIOLOGY

## 2017-03-23 PROCEDURE — 96377 APPLICATON ON-BODY INJECTOR: CPT

## 2017-03-23 PROCEDURE — 25010000002 PEGFILGRASTIM 6 MG/0.6ML PREFILLED SYRINGE KIT: Performed by: INTERNAL MEDICINE

## 2017-03-23 PROCEDURE — 77336 RADIATION PHYSICS CONSULT: CPT | Performed by: RADIOLOGY

## 2017-03-23 PROCEDURE — 25010000002 ETOPOSIDE 100 MG/5ML SOLUTION 50 ML VIAL: Performed by: INTERNAL MEDICINE

## 2017-03-23 PROCEDURE — 96413 CHEMO IV INFUSION 1 HR: CPT

## 2017-03-23 PROCEDURE — 96372 THER/PROPH/DIAG INJ SC/IM: CPT

## 2017-03-23 RX ORDER — SODIUM CHLORIDE 9 MG/ML
250 INJECTION, SOLUTION INTRAVENOUS ONCE
Status: COMPLETED | OUTPATIENT
Start: 2017-03-23 | End: 2017-03-23

## 2017-03-23 RX ADMIN — ETOPOSIDE 190 MG: 20 INJECTION, SOLUTION, CONCENTRATE INTRAVENOUS at 09:39

## 2017-03-23 RX ADMIN — PEGFILGRASTIM 6 MG: KIT SUBCUTANEOUS at 10:48

## 2017-03-23 RX ADMIN — SODIUM CHLORIDE 250 ML: 9 INJECTION, SOLUTION INTRAVENOUS at 09:41

## 2017-03-24 ENCOUNTER — HOSPITAL ENCOUNTER (OUTPATIENT)
Dept: RADIATION ONCOLOGY | Facility: HOSPITAL | Age: 62
Discharge: HOME | End: 2017-03-24

## 2017-03-24 PROCEDURE — 77387 GUIDANCE FOR RADJ TX DLVR: CPT | Performed by: RADIOLOGY

## 2017-03-24 PROCEDURE — 77412 RADIATION TX DELIVERY LVL 3: CPT | Performed by: RADIOLOGY

## 2017-03-27 ENCOUNTER — HOSPITAL ENCOUNTER (OUTPATIENT)
Dept: RADIATION ONCOLOGY | Facility: HOSPITAL | Age: 62
Discharge: HOME | End: 2017-03-27

## 2017-03-27 PROCEDURE — 77387 GUIDANCE FOR RADJ TX DLVR: CPT | Performed by: RADIOLOGY

## 2017-03-27 PROCEDURE — 77412 RADIATION TX DELIVERY LVL 3: CPT | Performed by: RADIOLOGY

## 2017-03-28 ENCOUNTER — HOSPITAL ENCOUNTER (OUTPATIENT)
Dept: RADIATION ONCOLOGY | Facility: HOSPITAL | Age: 62
Discharge: HOME | End: 2017-03-28

## 2017-03-28 ENCOUNTER — APPOINTMENT (OUTPATIENT)
Dept: ONCOLOGY | Facility: HOSPITAL | Age: 62
End: 2017-03-28

## 2017-03-28 PROCEDURE — 77387 GUIDANCE FOR RADJ TX DLVR: CPT | Performed by: RADIOLOGY

## 2017-03-28 PROCEDURE — 77412 RADIATION TX DELIVERY LVL 3: CPT | Performed by: RADIOLOGY

## 2017-03-29 ENCOUNTER — TELEPHONE (OUTPATIENT)
Dept: ONCOLOGY | Facility: CLINIC | Age: 62
End: 2017-03-29

## 2017-03-29 ENCOUNTER — HOSPITAL ENCOUNTER (OUTPATIENT)
Dept: RADIATION ONCOLOGY | Facility: HOSPITAL | Age: 62
Discharge: HOME | End: 2017-03-29

## 2017-03-29 VITALS — WEIGHT: 188.8 LBS | BODY MASS INDEX: 33.44 KG/M2

## 2017-03-29 PROCEDURE — 77412 RADIATION TX DELIVERY LVL 3: CPT | Performed by: RADIOLOGY

## 2017-03-29 PROCEDURE — 77336 RADIATION PHYSICS CONSULT: CPT | Performed by: RADIOLOGY

## 2017-03-29 PROCEDURE — 77387 GUIDANCE FOR RADJ TX DLVR: CPT | Performed by: RADIOLOGY

## 2017-03-29 NOTE — TELEPHONE ENCOUNTER
"Advised patient that we do not monitor her cholesterol, so her statin will need to be refilled by her PCP.  States she is unable to take the Restoril 30mg, since it causes her to feel \"hazy\" the next day. Will call in 15mg to pharmacy to see if tat helps with sleep, and lower dose may not cause the haziness the following day.  Patient advised to try claritin OTC for bony pain after treatment d/t neulasta. May continue tylenol as needed as well. Having some tingling in fingers and toes as well, advised I will let dr Tan know this, and may make some adjustments to chemo doses. Will discuss at next appt prior to treatment  "

## 2017-03-29 NOTE — TELEPHONE ENCOUNTER
----- Message from Kenia Hurley sent at 3/29/2017 10:31 AM EDT -----  Regarding: BADIN       REFILLS  Contact: [phone]  PT NEEDS NEW SCRIPTS  ATORBASTATIN 40 MG    COMPLETLEY OUT  AMBIEN    PATIENT DOES NOT WANT TO TAKE  TEMAZEPAM 30MG    PT STATES SHE IS HURTING A LOT AFTER 12 TREAMENTS OF CHEMO AND 16 RADIATION.   (SOMETHING FOR PAIN.    PHARMACY [facility]   [phone]  ALSO PLEASE CALL PT TO DISCUSS

## 2017-03-30 PROCEDURE — 77387 GUIDANCE FOR RADJ TX DLVR: CPT | Performed by: RADIOLOGY

## 2017-03-30 PROCEDURE — 77412 RADIATION TX DELIVERY LVL 3: CPT | Performed by: RADIOLOGY

## 2017-03-31 ENCOUNTER — HOSPITAL ENCOUNTER (OUTPATIENT)
Dept: RADIATION ONCOLOGY | Facility: HOSPITAL | Age: 62
Discharge: HOME | End: 2017-03-31

## 2017-03-31 PROCEDURE — 77387 GUIDANCE FOR RADJ TX DLVR: CPT | Performed by: RADIOLOGY

## 2017-03-31 PROCEDURE — 77412 RADIATION TX DELIVERY LVL 3: CPT | Performed by: RADIOLOGY

## 2017-04-03 ENCOUNTER — HOSPITAL ENCOUNTER (OUTPATIENT)
Dept: RADIATION ONCOLOGY | Facility: HOSPITAL | Age: 62
Discharge: HOME | End: 2017-04-03

## 2017-04-03 ENCOUNTER — HOSPITAL ENCOUNTER (OUTPATIENT)
Dept: RADIATION ONCOLOGY | Facility: HOSPITAL | Age: 62
Setting detail: RADIATION/ONCOLOGY SERIES
Discharge: HOME | End: 2017-04-03

## 2017-04-03 PROCEDURE — 77412 RADIATION TX DELIVERY LVL 3: CPT | Performed by: RADIOLOGY

## 2017-04-03 PROCEDURE — 77387 GUIDANCE FOR RADJ TX DLVR: CPT | Performed by: RADIOLOGY

## 2017-04-04 ENCOUNTER — HOSPITAL ENCOUNTER (OUTPATIENT)
Dept: RADIATION ONCOLOGY | Facility: HOSPITAL | Age: 62
Discharge: HOME | End: 2017-04-04

## 2017-04-04 VITALS — WEIGHT: 186 LBS | BODY MASS INDEX: 32.95 KG/M2

## 2017-04-04 PROCEDURE — 77387 GUIDANCE FOR RADJ TX DLVR: CPT | Performed by: RADIOLOGY

## 2017-04-04 PROCEDURE — 77412 RADIATION TX DELIVERY LVL 3: CPT | Performed by: RADIOLOGY

## 2017-04-05 ENCOUNTER — HOSPITAL ENCOUNTER (OUTPATIENT)
Dept: RADIATION ONCOLOGY | Facility: HOSPITAL | Age: 62
Discharge: HOME | End: 2017-04-05

## 2017-04-05 PROCEDURE — 77412 RADIATION TX DELIVERY LVL 3: CPT | Performed by: RADIOLOGY

## 2017-04-05 PROCEDURE — 77336 RADIATION PHYSICS CONSULT: CPT | Performed by: RADIOLOGY

## 2017-04-05 PROCEDURE — 77387 GUIDANCE FOR RADJ TX DLVR: CPT | Performed by: RADIOLOGY

## 2017-04-06 ENCOUNTER — HOSPITAL ENCOUNTER (OUTPATIENT)
Dept: RADIATION ONCOLOGY | Facility: HOSPITAL | Age: 62
Discharge: HOME | End: 2017-04-06

## 2017-04-06 PROCEDURE — 77412 RADIATION TX DELIVERY LVL 3: CPT | Performed by: RADIOLOGY

## 2017-04-06 PROCEDURE — 77387 GUIDANCE FOR RADJ TX DLVR: CPT | Performed by: RADIOLOGY

## 2017-04-07 ENCOUNTER — HOSPITAL ENCOUNTER (OUTPATIENT)
Dept: RADIATION ONCOLOGY | Facility: HOSPITAL | Age: 62
Discharge: HOME | End: 2017-04-07

## 2017-04-07 PROCEDURE — 77387 GUIDANCE FOR RADJ TX DLVR: CPT | Performed by: RADIOLOGY

## 2017-04-07 PROCEDURE — 77412 RADIATION TX DELIVERY LVL 3: CPT | Performed by: RADIOLOGY

## 2017-04-10 ENCOUNTER — HOSPITAL ENCOUNTER (OUTPATIENT)
Dept: RADIATION ONCOLOGY | Facility: HOSPITAL | Age: 62
Discharge: HOME | End: 2017-04-10

## 2017-04-10 PROCEDURE — 77387 GUIDANCE FOR RADJ TX DLVR: CPT | Performed by: RADIOLOGY

## 2017-04-10 PROCEDURE — 77412 RADIATION TX DELIVERY LVL 3: CPT | Performed by: RADIOLOGY

## 2017-04-11 ENCOUNTER — OFFICE VISIT (OUTPATIENT)
Dept: ONCOLOGY | Facility: CLINIC | Age: 62
End: 2017-04-11

## 2017-04-11 ENCOUNTER — HOSPITAL ENCOUNTER (OUTPATIENT)
Dept: RADIATION ONCOLOGY | Facility: HOSPITAL | Age: 62
Discharge: HOME | End: 2017-04-11

## 2017-04-11 ENCOUNTER — INFUSION (OUTPATIENT)
Dept: ONCOLOGY | Facility: HOSPITAL | Age: 62
End: 2017-04-11

## 2017-04-11 VITALS
HEART RATE: 71 BPM | TEMPERATURE: 97.5 F | WEIGHT: 186.2 LBS | RESPIRATION RATE: 16 BRPM | HEIGHT: 64 IN | BODY MASS INDEX: 31.79 KG/M2 | DIASTOLIC BLOOD PRESSURE: 89 MMHG | SYSTOLIC BLOOD PRESSURE: 133 MMHG

## 2017-04-11 DIAGNOSIS — C34.91 SMALL CELL CARCINOMA OF RIGHT LUNG (HCC): Primary | ICD-10-CM

## 2017-04-11 LAB
ALBUMIN SERPL-MCNC: 4 G/DL (ref 3.2–4.8)
ALBUMIN/GLOB SERPL: 1.4 G/DL (ref 1.5–2.5)
ALP SERPL-CCNC: 125 U/L (ref 25–100)
ALT SERPL W P-5'-P-CCNC: 24 U/L (ref 7–40)
ANION GAP SERPL CALCULATED.3IONS-SCNC: 7 MMOL/L (ref 3–11)
AST SERPL-CCNC: 27 U/L (ref 0–33)
BILIRUB SERPL-MCNC: 0.3 MG/DL (ref 0.3–1.2)
BUN BLD-MCNC: 7 MG/DL (ref 9–23)
BUN/CREAT SERPL: 10 (ref 7–25)
CALCIUM SPEC-SCNC: 10 MG/DL (ref 8.7–10.4)
CHLORIDE SERPL-SCNC: 105 MMOL/L (ref 99–109)
CO2 SERPL-SCNC: 24 MMOL/L (ref 20–31)
CREAT BLD-MCNC: 0.7 MG/DL (ref 0.6–1.3)
CREAT BLDA-MCNC: 0.8 MG/DL (ref 0.6–1.3)
ERYTHROCYTE [DISTWIDTH] IN BLOOD BY AUTOMATED COUNT: 20.5 % (ref 11.3–14.5)
GFR SERPL CREATININE-BSD FRML MDRD: 85 ML/MIN/1.73
GLOBULIN UR ELPH-MCNC: 2.8 GM/DL
GLUCOSE BLD-MCNC: 100 MG/DL (ref 70–100)
HCT VFR BLD AUTO: 38.6 % (ref 34.5–44)
HGB BLD-MCNC: 12.5 G/DL (ref 11.5–15.5)
LYMPHOCYTES # BLD AUTO: 0.7 10*3/MM3 (ref 0.6–4.8)
LYMPHOCYTES NFR BLD AUTO: 25.2 % (ref 24–44)
MAGNESIUM SERPL-MCNC: 2 MG/DL (ref 1.3–2.7)
MCH RBC QN AUTO: 28.3 PG (ref 27–31)
MCHC RBC AUTO-ENTMCNC: 32.3 G/DL (ref 32–36)
MCV RBC AUTO: 87.6 FL (ref 80–99)
MONOCYTES # BLD AUTO: 0.3 10*3/MM3 (ref 0–1)
MONOCYTES NFR BLD AUTO: 11.7 % (ref 0–12)
NEUTROPHILS # BLD AUTO: 1.6 10*3/MM3 (ref 1.5–8.3)
NEUTROPHILS NFR BLD AUTO: 63.1 % (ref 41–71)
PLATELET # BLD AUTO: 258 10*3/MM3 (ref 150–450)
PMV BLD AUTO: 7.5 FL (ref 6–12)
POTASSIUM BLD-SCNC: 4.4 MMOL/L (ref 3.5–5.5)
PROT SERPL-MCNC: 6.8 G/DL (ref 5.7–8.2)
RBC # BLD AUTO: 4.41 10*6/MM3 (ref 3.89–5.14)
SODIUM BLD-SCNC: 136 MMOL/L (ref 132–146)
WBC NRBC COR # BLD: 2.6 10*3/MM3 (ref 3.5–10.8)

## 2017-04-11 PROCEDURE — 77387 GUIDANCE FOR RADJ TX DLVR: CPT | Performed by: RADIOLOGY

## 2017-04-11 PROCEDURE — 96361 HYDRATE IV INFUSION ADD-ON: CPT

## 2017-04-11 PROCEDURE — 96413 CHEMO IV INFUSION 1 HR: CPT

## 2017-04-11 PROCEDURE — 25010000002 POTASSIUM CHLORIDE PER 2 MEQ OF POTASSIUM: Performed by: INTERNAL MEDICINE

## 2017-04-11 PROCEDURE — 25010000002 DEXAMETHASONE PER 1 MG: Performed by: INTERNAL MEDICINE

## 2017-04-11 PROCEDURE — 25010000002 CISPLATIN PER 50 MG: Performed by: INTERNAL MEDICINE

## 2017-04-11 PROCEDURE — 25010000002 ETOPOSIDE 100 MG/5ML SOLUTION 50 ML VIAL: Performed by: INTERNAL MEDICINE

## 2017-04-11 PROCEDURE — 36415 COLL VENOUS BLD VENIPUNCTURE: CPT

## 2017-04-11 PROCEDURE — 80053 COMPREHEN METABOLIC PANEL: CPT

## 2017-04-11 PROCEDURE — 96375 TX/PRO/DX INJ NEW DRUG ADDON: CPT

## 2017-04-11 PROCEDURE — 96368 THER/DIAG CONCURRENT INF: CPT

## 2017-04-11 PROCEDURE — 25010000002 MAGNESIUM SULFATE PER 500 MG OF MAGNESIUM: Performed by: INTERNAL MEDICINE

## 2017-04-11 PROCEDURE — 96415 CHEMO IV INFUSION ADDL HR: CPT

## 2017-04-11 PROCEDURE — 96417 CHEMO IV INFUS EACH ADDL SEQ: CPT

## 2017-04-11 PROCEDURE — 85025 COMPLETE CBC W/AUTO DIFF WBC: CPT

## 2017-04-11 PROCEDURE — 99215 OFFICE O/P EST HI 40 MIN: CPT | Performed by: INTERNAL MEDICINE

## 2017-04-11 PROCEDURE — 25010000002 PALONOSETRON PER 25 MCG: Performed by: INTERNAL MEDICINE

## 2017-04-11 PROCEDURE — 25010000002 FOSAPREPITANT PER 1 MG: Performed by: INTERNAL MEDICINE

## 2017-04-11 PROCEDURE — 77412 RADIATION TX DELIVERY LVL 3: CPT | Performed by: RADIOLOGY

## 2017-04-11 PROCEDURE — 96366 THER/PROPH/DIAG IV INF ADDON: CPT

## 2017-04-11 PROCEDURE — 83735 ASSAY OF MAGNESIUM: CPT

## 2017-04-11 PROCEDURE — 82565 ASSAY OF CREATININE: CPT

## 2017-04-11 PROCEDURE — 96367 TX/PROPH/DG ADDL SEQ IV INF: CPT

## 2017-04-11 RX ORDER — PALONOSETRON 0.05 MG/ML
0.25 INJECTION, SOLUTION INTRAVENOUS ONCE
Status: COMPLETED | OUTPATIENT
Start: 2017-04-11 | End: 2017-04-11

## 2017-04-11 RX ORDER — ZOLPIDEM TARTRATE 10 MG/1
10 TABLET ORAL NIGHTLY
Qty: 30 TABLET | Refills: 2 | Status: SHIPPED | OUTPATIENT
Start: 2017-04-11 | End: 2017-07-06 | Stop reason: SDUPTHER

## 2017-04-11 RX ORDER — SODIUM CHLORIDE 9 MG/ML
250 INJECTION, SOLUTION INTRAVENOUS ONCE
Status: COMPLETED | OUTPATIENT
Start: 2017-04-11 | End: 2017-04-11

## 2017-04-11 RX ADMIN — SODIUM CHLORIDE 250 ML: 9 INJECTION, SOLUTION INTRAVENOUS at 11:19

## 2017-04-11 RX ADMIN — PALONOSETRON HYDROCHLORIDE 0.25 MG: 0.25 INJECTION INTRAVENOUS at 13:27

## 2017-04-11 RX ADMIN — ETOPOSIDE 190 MG: 20 INJECTION, SOLUTION, CONCENTRATE INTRAVENOUS at 15:00

## 2017-04-11 RX ADMIN — POTASSIUM CHLORIDE 1000 ML: 2 INJECTION, SOLUTION, CONCENTRATE INTRAVENOUS at 15:00

## 2017-04-11 RX ADMIN — CISPLATIN 142 MG: 1 INJECTION, SOLUTION INTRAVENOUS at 13:53

## 2017-04-11 RX ADMIN — SODIUM CHLORIDE 150 MG: 9 INJECTION, SOLUTION INTRAVENOUS at 13:28

## 2017-04-11 RX ADMIN — DEXAMETHASONE SODIUM PHOSPHATE 12 MG: 4 INJECTION, SOLUTION INTRAMUSCULAR; INTRAVENOUS at 13:28

## 2017-04-11 RX ADMIN — POTASSIUM CHLORIDE 1000 ML: 2 INJECTION, SOLUTION, CONCENTRATE INTRAVENOUS at 11:20

## 2017-04-11 NOTE — PROGRESS NOTES
"DATE OF VISIT: 4/11/2017    REASON FOR VISIT: Limited stage small cell lung cancer T2N0M0.       HISTORY OF PRESENT ILLNESS: The patient is a very pleasant 61 y.o. female  with past medical history significant for small cell lung cancer diagnosed 12/30/2016. The patient presented with pneumonia unresponsive to antibiotics. Chest x-ray was completed that revealed a right upper lobe lung mass, confirmed on CT angiogram with multiple pulmonary nodules and left lower lobe infiltrate. He underwent bronchoscopy with biopsies on 12/30/2016 with pathology revealing small kevin lung cancer. The patient had staging work up including PET scan and MRI brain that failed to show evidence of distant metastatic disease. The patient was started on definitive treatment with cisplatin/etoposide with radiation on 2/8/2017. The patient is here today for cycle #4 of treatment.     SUBJECTIVE: The patient has been doing fairly well. She is tolerating her treatment without significant side effects. She is having some worsening fatigue, and she just feels generally \"wron out.\" She only has 6 more treatments with radiation left. She has had no significant nausea or vomiting. She is eating and drinking well.  She notes she feels \"hungover\" with Restoril use and would like to go back to Ambien for sleep.     PAST MEDICAL HISTORY/SOCIAL HISTORY/FAMILY HISTORY: Unchanged from my prior documentation done on 01/27/2017.    Review of Systems   Constitutional: Positive for fatigue. Negative for activity change, appetite change, chills, fever and unexpected weight change.   HENT: Negative for hearing loss, mouth sores, nosebleeds, sore throat and trouble swallowing.    Eyes: Negative for visual disturbance.   Respiratory: Positive for cough. Negative for chest tightness, shortness of breath and wheezing.    Cardiovascular: Negative for chest pain and palpitations.   Gastrointestinal: Negative for abdominal distention, abdominal pain, blood in stool, " constipation, diarrhea, rectal pain and vomiting.   Endocrine: Negative for cold intolerance and heat intolerance.   Genitourinary: Negative for difficulty urinating, dysuria, frequency and urgency.   Musculoskeletal: Negative for arthralgias, back pain, gait problem, joint swelling and myalgias.   Skin: Negative for rash.   Neurological: Negative for dizziness, tremors, syncope, weakness, light-headedness, numbness and headaches.   Hematological: Negative for adenopathy. Does not bruise/bleed easily.   Psychiatric/Behavioral: Positive for sleep disturbance. Negative for confusion and suicidal ideas. The patient is not nervous/anxious.          Current Outpatient Prescriptions:   •  acetaminophen (TYLENOL) 500 MG tablet, Take 650 mg by mouth 3 (Three) Times a Day As Needed for mild pain (1-3)., Disp: , Rfl:   •  amLODIPine (NORVASC) 5 MG tablet, Take 1 tablet by mouth Daily., Disp: 30 tablet, Rfl: 2  •  atorvastatin (LIPITOR) 40 MG tablet, Take 40 mg by mouth Every Night., Disp: , Rfl:   •  citalopram (CeleXA) 10 MG tablet, Take 40 mg by mouth Daily., Disp: , Rfl:   •  dexamethasone (DECADRON) 4 MG tablet, Take 1 tablet by mouth Take As Directed. Take 2 tablets in the morning on day 2, then take 2 tablets BID on days 3 and 4 chemo, Disp: 40 tablet, Rfl: 5  •  Fluticasone Furoate-Vilanterol (BREO ELLIPTA) 100-25 MCG/INH aerosol powder , Inhale 1 puff Daily., Disp: 1 each, Rfl: 11  •  levothyroxine (SYNTHROID, LEVOTHROID) 88 MCG tablet, Take 88 mcg by mouth Daily., Disp: , Rfl:   •  LORazepam (ATIVAN) 1 MG tablet, Take 1 tablet by mouth Every 8 (Eight) Hours As Needed for anxiety., Disp: 90 tablet, Rfl: 0  •  Naproxen Sodium (ALEVE PO), Take  by mouth., Disp: , Rfl:   •  nystatin susp + lidocaine viscous (MAGIC MOUTHWASH) oral suspension, 5-10 ml swish and spit or swallow QID prn, Disp: 240 mL, Rfl: 3  •  omeprazole (priLOSEC) 40 MG capsule, Take 1 capsule by mouth Daily., Disp: 30 capsule, Rfl: 5  •  ondansetron  (ZOFRAN) 8 MG tablet, Take 1 tablet by mouth Every 8 (Eight) Hours As Needed for nausea or vomiting for up to 60 doses., Disp: 30 tablet, Rfl: 5  •  Polyethylene Glycol 3350 (MIRALAX PO), Take  by mouth., Disp: , Rfl:   •  promethazine (PHENERGAN) 25 MG tablet, Take 1 tablet by mouth Every 6 (Six) Hours As Needed for nausea or vomiting for up to 60 doses., Disp: 45 tablet, Rfl: 5  •  saccharomyces boulardii (FLORASTOR) 250 MG capsule, Take 1 capsule by mouth 2 (Two) Times a Day., Disp: 60 capsule, Rfl: 0  •  temazepam (RESTORIL) 30 MG capsule, TK ONE C PO QHS PRF SLEEP, Disp: , Rfl: 2  •  Unable to find, COMPOUND DRUG, Disp: , Rfl: 3  •  zolpidem (AMBIEN) 10 MG tablet, Take 10 mg by mouth Every Night., Disp: , Rfl:     PHYSICAL EXAMINATION:   There were no vitals taken for this visit.   ECOG Performance Status: 1 - Symptomatic but completely ambulatory  General Appearance:  alert, cooperative, no apparent distress and appears stated age   Neurologic/Psychiatric: A&O x 3, gait steady, appropriate affect, strength 5/5 in all muscle groups   HEENT:  Normocephalic, without obvious abnormality, mucous membranes moist   Neck: Supple, symmetrical, trachea midline, no adenopathy;  No thyromegaly, masses, or tenderness   Lungs:   Clear to auscultation bilaterally; respirations regular, even, and unlabored bilaterally   Heart:  Regular rate and rhythm, no murmurs appreciated   Abdomen:   Soft, non-tender, non-distended and no organomegaly   Lymph nodes: No cervical, supraclavicular, inguinal or axillary adenopathy noted   Extremities: Normal, atraumatic; no clubbing, cyanosis, or edema    Skin: No rashes, ulcers, or suspicious lesions noted     No visits with results within 2 Week(s) from this visit.  Latest known visit with results is:    Infusion on 03/21/2017   Component Date Value Ref Range Status   • Glucose 03/21/2017 97  70 - 100 mg/dL Final   • BUN 03/21/2017 12  9 - 23 mg/dL Final   • Creatinine 03/21/2017 0.70  0.60  - 1.30 mg/dL Final   • Sodium 03/21/2017 139  132 - 146 mmol/L Final   • Potassium 03/21/2017 4.7  3.5 - 5.5 mmol/L Final   • Chloride 03/21/2017 105  99 - 109 mmol/L Final   • CO2 03/21/2017 35.0* 20.0 - 31.0 mmol/L Final   • Calcium 03/21/2017 9.6  8.7 - 10.4 mg/dL Final   • Total Protein 03/21/2017 6.4  5.7 - 8.2 g/dL Final   • Albumin 03/21/2017 4.10  3.20 - 4.80 g/dL Final   • ALT (SGPT) 03/21/2017 24  7 - 40 U/L Final   • AST (SGOT) 03/21/2017 27  0 - 33 U/L Final   • Alkaline Phosphatase 03/21/2017 117* 25 - 100 U/L Final   • Total Bilirubin 03/21/2017 0.3  0.3 - 1.2 mg/dL Final   • eGFR Non African Amer 03/21/2017 85  >60 mL/min/1.73 Final   • Globulin 03/21/2017 2.3  gm/dL Final   • A/G Ratio 03/21/2017 1.8  1.5 - 2.5 g/dL Final   • BUN/Creatinine Ratio 03/21/2017 17.1  7.0 - 25.0 Final   • Anion Gap 03/21/2017 -1.0* 3.0 - 11.0 mmol/L Final   • Magnesium 03/21/2017 1.7  1.3 - 2.7 mg/dL Final   • WBC 03/21/2017 9.40  3.50 - 10.80 10*3/mm3 Final   • RBC 03/21/2017 4.34  3.89 - 5.14 10*6/mm3 Final   • Hemoglobin 03/21/2017 12.4  11.5 - 15.5 g/dL Final   • Hematocrit 03/21/2017 38.1  34.5 - 44.0 % Final   • RDW 03/21/2017 19.8* 11.3 - 14.5 % Final   • MCV 03/21/2017 87.9  80.0 - 99.0 fL Final   • MCH 03/21/2017 28.5  27.0 - 31.0 pg Final   • MCHC 03/21/2017 32.5  32.0 - 36.0 g/dL Final   • MPV 03/21/2017 8.2  6.0 - 12.0 fL Final   • Platelets 03/21/2017 255  150 - 450 10*3/mm3 Final   • Neutrophil % 03/21/2017 83.4* 41.0 - 71.0 % Final   • Lymphocyte % 03/21/2017 11.4* 24.0 - 44.0 % Final   • Monocyte % 03/21/2017 5.2  0.0 - 12.0 % Final   • Neutrophils, Absolute 03/21/2017 7.80  1.50 - 8.30 10*3/mm3 Final   • Lymphocytes, Absolute 03/21/2017 1.10  0.60 - 4.80 10*3/mm3 Final   • Monocytes, Absolute 03/21/2017 0.50  0.00 - 1.00 10*3/mm3 Final   • Creatinine 03/21/2017 0.70  0.60 - 1.30 mg/dL Final    Serial Number: [id number]    : [id number]        No results found.    ASSESSMENT: The patient is a very pleasant  61 y.o. female with small cell lung cancer.     PROBLEM LIST:  1. Presented with cough, shortness of breath, and pneumonia.   2. Right upper lobe mass with multiple scattered pulmonary nodules bilaterally.   3. Status post bronchoscopy with biopsy revealing small cell lung cancer.   4. PET scan done 2/2/2017 shows disease in the right upper lobe with no evidence of distant metastatic disease. MRI brain negative on 2/2/2017  5.  Started definitive chemotherapy with radiation using cisplatin and etoposide on February 7, 2017.  Status post 3 cycles.  6. COPD  7. History of cervical cancer status post hysterectomy.  8. History of tonsillar cancer status post chemo/radiation.   9. Hypertension.  10. Anxiety and depression.  11. Hypothyroidism.    PLAN:  1. We will proceed with treatment today, cycle # 4 of cisplatin/etoposide as planned today.   2. The patient will continue radiation therapy under the care of Dr. Cardenas. She is receiving adjuvant radiation daily.  The patient declined twice a day schedule. She will finish treatment 04/19/2017.    3. The patient will continue Decadron twice per day on days with chemotherapy.   4. The patient will continue Zofran to be used as needed for nausea.    5. We will continue Neulasta on-body injector to each cycle for bone marrow support.  6. The patient will come back to see us in 4 weeks with repeat CAT scans.    7. We will monitor the patient's blood counts, kidney functions, and liver functions throughout treatment.   8. We will change the patient back to Ambien 10 mg at bedtime for insomnia due to complaints of morning drowsiness with Restoril. She was given a new prescription today.   9. We reviewed the potential side effects of this regimen including fatigue, vomiting and nausea, hair loss, nephropathy, neuropathy, hearing loss, myelosuppression, and risk of infusion reaction.      Scribed for Zay Tan MD by Kae Covarrubias, HELEN. 4/11/2017  8:20 AM  Zay Tan  MD  4/11/2017   IZay MD, personally performed the services described in this documentation as scribed by the above named individual in my presence, and it is both accurate and complete.  4/11/2017  10:31 AM

## 2017-04-12 ENCOUNTER — HOSPITAL ENCOUNTER (OUTPATIENT)
Dept: RADIATION ONCOLOGY | Facility: HOSPITAL | Age: 62
Discharge: HOME | End: 2017-04-12

## 2017-04-12 ENCOUNTER — INFUSION (OUTPATIENT)
Dept: ONCOLOGY | Facility: HOSPITAL | Age: 62
End: 2017-04-12

## 2017-04-12 VITALS — BODY MASS INDEX: 32.42 KG/M2 | WEIGHT: 188.9 LBS

## 2017-04-12 VITALS
HEIGHT: 64 IN | SYSTOLIC BLOOD PRESSURE: 170 MMHG | HEART RATE: 87 BPM | WEIGHT: 189 LBS | TEMPERATURE: 98 F | RESPIRATION RATE: 18 BRPM | BODY MASS INDEX: 32.27 KG/M2 | DIASTOLIC BLOOD PRESSURE: 103 MMHG

## 2017-04-12 DIAGNOSIS — C34.91 SMALL CELL CARCINOMA OF RIGHT LUNG (HCC): Primary | ICD-10-CM

## 2017-04-12 PROCEDURE — 25010000002 ETOPOSIDE 100 MG/5ML SOLUTION 50 ML VIAL: Performed by: INTERNAL MEDICINE

## 2017-04-12 PROCEDURE — 77412 RADIATION TX DELIVERY LVL 3: CPT | Performed by: RADIOLOGY

## 2017-04-12 PROCEDURE — 77336 RADIATION PHYSICS CONSULT: CPT | Performed by: RADIOLOGY

## 2017-04-12 PROCEDURE — 77387 GUIDANCE FOR RADJ TX DLVR: CPT | Performed by: RADIOLOGY

## 2017-04-12 PROCEDURE — 96413 CHEMO IV INFUSION 1 HR: CPT

## 2017-04-12 RX ORDER — SODIUM CHLORIDE 9 MG/ML
250 INJECTION, SOLUTION INTRAVENOUS ONCE
Status: COMPLETED | OUTPATIENT
Start: 2017-04-12 | End: 2017-04-12

## 2017-04-12 RX ADMIN — ETOPOSIDE 190 MG: 20 INJECTION, SOLUTION, CONCENTRATE INTRAVENOUS at 09:51

## 2017-04-12 RX ADMIN — SODIUM CHLORIDE 250 ML: 9 INJECTION, SOLUTION INTRAVENOUS at 09:48

## 2017-04-13 ENCOUNTER — INFUSION (OUTPATIENT)
Dept: ONCOLOGY | Facility: HOSPITAL | Age: 62
End: 2017-04-13

## 2017-04-13 ENCOUNTER — HOSPITAL ENCOUNTER (OUTPATIENT)
Dept: RADIATION ONCOLOGY | Facility: HOSPITAL | Age: 62
Discharge: HOME | End: 2017-04-13

## 2017-04-13 VITALS
WEIGHT: 187 LBS | RESPIRATION RATE: 18 BRPM | DIASTOLIC BLOOD PRESSURE: 91 MMHG | TEMPERATURE: 98 F | HEART RATE: 77 BPM | BODY MASS INDEX: 31.92 KG/M2 | SYSTOLIC BLOOD PRESSURE: 144 MMHG | HEIGHT: 64 IN

## 2017-04-13 DIAGNOSIS — C34.91 SMALL CELL CARCINOMA OF RIGHT LUNG (HCC): Primary | ICD-10-CM

## 2017-04-13 PROCEDURE — 96372 THER/PROPH/DIAG INJ SC/IM: CPT

## 2017-04-13 PROCEDURE — 77387 GUIDANCE FOR RADJ TX DLVR: CPT | Performed by: RADIOLOGY

## 2017-04-13 PROCEDURE — 96377 APPLICATON ON-BODY INJECTOR: CPT

## 2017-04-13 PROCEDURE — 77412 RADIATION TX DELIVERY LVL 3: CPT | Performed by: RADIOLOGY

## 2017-04-13 PROCEDURE — 25010000002 PEGFILGRASTIM 6 MG/0.6ML PREFILLED SYRINGE KIT: Performed by: INTERNAL MEDICINE

## 2017-04-13 PROCEDURE — 96413 CHEMO IV INFUSION 1 HR: CPT

## 2017-04-13 PROCEDURE — 25010000002 ETOPOSIDE 100 MG/5ML SOLUTION 50 ML VIAL: Performed by: INTERNAL MEDICINE

## 2017-04-13 RX ADMIN — PEGFILGRASTIM 6 MG: KIT SUBCUTANEOUS at 10:36

## 2017-04-13 RX ADMIN — ETOPOSIDE 190 MG: 20 INJECTION, SOLUTION, CONCENTRATE INTRAVENOUS at 09:33

## 2017-04-17 ENCOUNTER — HOSPITAL ENCOUNTER (OUTPATIENT)
Dept: RADIATION ONCOLOGY | Facility: HOSPITAL | Age: 62
Discharge: HOME | End: 2017-04-17

## 2017-04-17 PROCEDURE — 77387 GUIDANCE FOR RADJ TX DLVR: CPT | Performed by: RADIOLOGY

## 2017-04-17 PROCEDURE — 77412 RADIATION TX DELIVERY LVL 3: CPT | Performed by: RADIOLOGY

## 2017-04-18 ENCOUNTER — HOSPITAL ENCOUNTER (OUTPATIENT)
Dept: RADIATION ONCOLOGY | Facility: HOSPITAL | Age: 62
Discharge: HOME | End: 2017-04-18

## 2017-04-18 VITALS — BODY MASS INDEX: 31.93 KG/M2 | WEIGHT: 186 LBS

## 2017-04-18 PROCEDURE — 77387 GUIDANCE FOR RADJ TX DLVR: CPT | Performed by: RADIOLOGY

## 2017-04-18 PROCEDURE — 77412 RADIATION TX DELIVERY LVL 3: CPT | Performed by: RADIOLOGY

## 2017-04-19 ENCOUNTER — HOSPITAL ENCOUNTER (OUTPATIENT)
Dept: RADIATION ONCOLOGY | Facility: HOSPITAL | Age: 62
Discharge: HOME | End: 2017-04-19

## 2017-04-19 ENCOUNTER — DOCUMENTATION (OUTPATIENT)
Dept: NUTRITION | Facility: HOSPITAL | Age: 62
End: 2017-04-19

## 2017-04-19 PROCEDURE — 77387 GUIDANCE FOR RADJ TX DLVR: CPT | Performed by: RADIOLOGY

## 2017-04-19 PROCEDURE — 77412 RADIATION TX DELIVERY LVL 3: CPT | Performed by: RADIOLOGY

## 2017-04-19 NOTE — PROGRESS NOTES
[facility] Nutrition    Weight 186 lbs. / weight stable    Patient nearing completion of 30 radiation treatments to right lung mass and has completed 4 cycles Etoposide and Cisplatin for limited stage small cell lung cancer. Patient has history of tonsil cancer treated with radiation in 2009 and cervical cancer.     Appetite, oral food intake per recall and stable weight indicate that nutritional status has remained stable during treatment.  Patient denies any nutritional impact symptoms at this time.  Will follow as needed.

## 2017-04-20 NOTE — THERAPY DISCHARGE NOTE
COMPLETION NOTE    PATIENT:   Leonarda Benitez  :    1955  COMPLETION DATE:   17  DIAGNOSIS:   Small cell carcinoma of right lung, Staging form: Lung, AJCC V7, Clinical: Stage IB (T2a, N0, M0)      Leonarda Benitez  is a very pleasant 61 y.o. female  with small cell lung carcinoma of the lung diagnosed 2016. She presented with pneumonia and chest x-ray a right upper lobe lung mass, confirmed on CT angiogram with multiple pulmonary nodules and left lower lobe infiltrate. She underwent bronchoscopy with biopsies on 2016. Pathology revealed small kevin lung cancer. Staging work up of PET scan and MRI of the brain was negative for metastatic disease. The PET scan showed a dominant mass lesion in the right upper lobe which was spiculated and measures 4.7 x 4.3 cm. It is intensely pathologically hypermetabolic with a maximum SUV value of 10.88. There was no mediastinal or hilar adenopathy. There was consolidation in the left lung base and pleural effusion with SUV 3.11. There were scattered nodules bilaterally in mid lungs, not hypermetabolic.   She had a   mild cough and shortness of air with activity. She   had swelling in the lower extremities bilaterally that was stable. She had no headaches, blurred vision, fever, chills, night sweats or weight loss. She underwent concurrent  chemotherapy per Dr Tan and radiation in our department.    Of note, she has a history of tonsillar and cervical cancer without evidence of recurrence.       The patient received radiotherapy in our department as follows:    TREATMENT COURSE:   3/7-2017 the right lung mass and nodes received 60 Gy in 30 fractions with 6 MV photons     TOLERANCE:   Mrs. Benitez maintained her weight at 186 pounds. She experienced nausea and aching from chemotherapy.  Claritan eased the aching and she had antiemetics.  She developed mild fatigue and mild dermatitis.        DISPOSITION:  At the completion of therapy an appointment was  made for her to re/turn on 5/17/2017 at 9 AM .  She knows to call if she has any problems sooner.        Lesa Cardenas MD    Errors in dictation may reflect use of voice recognition software and not all errors in transcription may have been detected prior to signing.

## 2017-04-21 ENCOUNTER — TELEPHONE (OUTPATIENT)
Dept: ONCOLOGY | Facility: CLINIC | Age: 62
End: 2017-04-21

## 2017-04-21 DIAGNOSIS — C34.91 SMALL CELL CARCINOMA OF RIGHT LUNG (HCC): Primary | ICD-10-CM

## 2017-04-21 RX ORDER — OXYCODONE HYDROCHLORIDE 5 MG/1
5 TABLET ORAL EVERY 4 HOURS PRN
Qty: 20 TABLET | Refills: 0 | Status: SHIPPED | OUTPATIENT
Start: 2017-04-21 | End: 2017-05-16 | Stop reason: SDUPTHER

## 2017-04-21 NOTE — TELEPHONE ENCOUNTER
Patient called Dr Carrillo last night while he was on call.  Reported pain with ibuprofen not being effective.  Dr Carrillo ordered oxycodone 5 mg q 4 hrs # 20, patient called and to  in clinic.

## 2017-04-21 NOTE — TELEPHONE ENCOUNTER
----- Message from Alejandrina Foss RN sent at 4/21/2017  9:43 AM EDT -----  Regarding: FW: BADIN      BACK AND HIP PAIN  Contact: [phone]  Will you ask Jeramie about this patient. Just let me know what he says and i'll do whatever to help.   ----- Message -----     From: Kenia Hurley     Sent: 4/21/2017   8:56 AM       To: [facility]  Subject: BADIN      BACK AND HIP PAIN                     THIS PATIENT CALLED AND SAID SHE TALKED TO DR BOBO  LAST NIGHT. (ON THE PHONE)    SHE HAS LOWER BACK AND HIP PAIN.  JERAMIE TOLD HER MAYBE FROM THE [facility]???    SHE SAID HE COULD NOT GIVE HER ANYTHING UNTIL HE COMES INTO THE OFFICE.    PLEASE TALK TO DR BOBO AND CALL THIS PATIENT TO DISCUSS HER CARE PLAN

## 2017-04-25 RX ORDER — LORAZEPAM 1 MG/1
TABLET ORAL
Qty: 90 TABLET | Refills: 0 | Status: SHIPPED | OUTPATIENT
Start: 2017-04-25 | End: 2018-01-01 | Stop reason: SDUPTHER

## 2017-05-03 RX ORDER — AMLODIPINE BESYLATE 5 MG/1
TABLET ORAL
Qty: 30 TABLET | Refills: 0 | Status: SHIPPED | OUTPATIENT
Start: 2017-05-03

## 2017-05-04 DIAGNOSIS — C34.91 SMALL CELL CARCINOMA OF RIGHT LUNG (HCC): Primary | ICD-10-CM

## 2017-05-05 ENCOUNTER — LAB (OUTPATIENT)
Dept: LAB | Facility: HOSPITAL | Age: 62
End: 2017-05-05

## 2017-05-05 ENCOUNTER — HOSPITAL ENCOUNTER (OUTPATIENT)
Dept: CT IMAGING | Facility: HOSPITAL | Age: 62
Discharge: HOME | End: 2017-05-05
Admitting: NURSE PRACTITIONER

## 2017-05-05 DIAGNOSIS — C34.91 SMALL CELL CARCINOMA OF RIGHT LUNG (HCC): ICD-10-CM

## 2017-05-05 LAB
ALBUMIN SERPL-MCNC: 4.5 G/DL (ref 3.2–4.8)
ALBUMIN/GLOB SERPL: 1.9 G/DL (ref 1.5–2.5)
ALP SERPL-CCNC: 129 U/L (ref 25–100)
ALT SERPL W P-5'-P-CCNC: 23 U/L (ref 7–40)
ANION GAP SERPL CALCULATED.3IONS-SCNC: 10 MMOL/L (ref 3–11)
AST SERPL-CCNC: 35 U/L (ref 0–33)
BASOPHILS # BLD AUTO: 0.03 10*3/MM3 (ref 0–0.2)
BASOPHILS NFR BLD AUTO: 0.5 % (ref 0–1)
BILIRUB SERPL-MCNC: 0.3 MG/DL (ref 0.3–1.2)
BUN BLD-MCNC: 12 MG/DL (ref 9–23)
BUN/CREAT SERPL: 17.1 (ref 7–25)
CALCIUM SPEC-SCNC: 10.2 MG/DL (ref 8.7–10.4)
CHLORIDE SERPL-SCNC: 99 MMOL/L (ref 99–109)
CO2 SERPL-SCNC: 27 MMOL/L (ref 20–31)
CREAT BLD-MCNC: 0.7 MG/DL (ref 0.6–1.3)
DEPRECATED RDW RBC AUTO: 63.6 FL (ref 37–54)
EOSINOPHIL # BLD AUTO: 0.02 10*3/MM3 (ref 0.1–0.3)
EOSINOPHIL NFR BLD AUTO: 0.3 % (ref 0–3)
ERYTHROCYTE [DISTWIDTH] IN BLOOD BY AUTOMATED COUNT: 19.1 % (ref 11.3–14.5)
GFR SERPL CREATININE-BSD FRML MDRD: 85 ML/MIN/1.73
GLOBULIN UR ELPH-MCNC: 2.4 GM/DL
GLUCOSE BLD-MCNC: 95 MG/DL (ref 70–100)
HCT VFR BLD AUTO: 39.5 % (ref 34.5–44)
HGB BLD-MCNC: 12.8 G/DL (ref 11.5–15.5)
IMM GRANULOCYTES # BLD: 0.08 10*3/MM3 (ref 0–0.03)
IMM GRANULOCYTES NFR BLD: 1.3 % (ref 0–0.6)
LYMPHOCYTES # BLD AUTO: 0.79 10*3/MM3 (ref 0.6–4.8)
LYMPHOCYTES NFR BLD AUTO: 13 % (ref 24–44)
MCH RBC QN AUTO: 29.6 PG (ref 27–31)
MCHC RBC AUTO-ENTMCNC: 32.4 G/DL (ref 32–36)
MCV RBC AUTO: 91.2 FL (ref 80–99)
MONOCYTES # BLD AUTO: 1.34 10*3/MM3 (ref 0–1)
MONOCYTES NFR BLD AUTO: 22.1 % (ref 0–12)
NEUTROPHILS # BLD AUTO: 3.81 10*3/MM3 (ref 1.5–8.3)
NEUTROPHILS NFR BLD AUTO: 62.8 % (ref 41–71)
PLAT MORPH BLD: NORMAL
PLATELET # BLD AUTO: 313 10*3/MM3 (ref 150–450)
PMV BLD AUTO: 10.4 FL (ref 6–12)
POTASSIUM BLD-SCNC: 5.1 MMOL/L (ref 3.5–5.5)
PROT SERPL-MCNC: 6.9 G/DL (ref 5.7–8.2)
RBC # BLD AUTO: 4.33 10*6/MM3 (ref 3.89–5.14)
RBC MORPH BLD: NORMAL
SODIUM BLD-SCNC: 136 MMOL/L (ref 132–146)
WBC MORPH BLD: NORMAL
WBC NRBC COR # BLD: 6.07 10*3/MM3 (ref 3.5–10.8)

## 2017-05-05 PROCEDURE — 74177 CT ABD & PELVIS W/CONTRAST: CPT

## 2017-05-05 PROCEDURE — 80053 COMPREHEN METABOLIC PANEL: CPT | Performed by: INTERNAL MEDICINE

## 2017-05-05 PROCEDURE — 0 IOPAMIDOL 61 % SOLUTION: Performed by: NURSE PRACTITIONER

## 2017-05-05 PROCEDURE — 36415 COLL VENOUS BLD VENIPUNCTURE: CPT

## 2017-05-05 PROCEDURE — 85007 BL SMEAR W/DIFF WBC COUNT: CPT | Performed by: INTERNAL MEDICINE

## 2017-05-05 PROCEDURE — 71260 CT THORAX DX C+: CPT

## 2017-05-05 PROCEDURE — 85025 COMPLETE CBC W/AUTO DIFF WBC: CPT | Performed by: INTERNAL MEDICINE

## 2017-05-05 RX ADMIN — IOPAMIDOL 85 ML: 612 INJECTION, SOLUTION INTRAVENOUS at 09:38

## 2017-05-09 ENCOUNTER — OFFICE VISIT (OUTPATIENT)
Dept: ONCOLOGY | Facility: CLINIC | Age: 62
End: 2017-05-09

## 2017-05-09 VITALS
BODY MASS INDEX: 32.27 KG/M2 | SYSTOLIC BLOOD PRESSURE: 141 MMHG | TEMPERATURE: 97.6 F | HEART RATE: 75 BPM | RESPIRATION RATE: 18 BRPM | HEIGHT: 64 IN | DIASTOLIC BLOOD PRESSURE: 98 MMHG | WEIGHT: 189 LBS

## 2017-05-09 DIAGNOSIS — C34.91 SMALL CELL CARCINOMA OF RIGHT LUNG (HCC): Primary | ICD-10-CM

## 2017-05-09 PROCEDURE — 99215 OFFICE O/P EST HI 40 MIN: CPT | Performed by: INTERNAL MEDICINE

## 2017-05-09 RX ORDER — SODIUM CHLORIDE 9 MG/ML
250 INJECTION, SOLUTION INTRAVENOUS ONCE
Status: CANCELLED | OUTPATIENT
Start: 2017-05-18

## 2017-05-09 RX ORDER — PALONOSETRON 0.05 MG/ML
0.25 INJECTION, SOLUTION INTRAVENOUS ONCE
Status: CANCELLED | OUTPATIENT
Start: 2017-06-06

## 2017-05-09 RX ORDER — SODIUM CHLORIDE 9 MG/ML
250 INJECTION, SOLUTION INTRAVENOUS ONCE
Status: CANCELLED | OUTPATIENT
Start: 2017-06-08

## 2017-05-09 RX ORDER — SODIUM CHLORIDE 9 MG/ML
250 INJECTION, SOLUTION INTRAVENOUS ONCE
Status: CANCELLED | OUTPATIENT
Start: 2017-06-06

## 2017-05-09 RX ORDER — SODIUM CHLORIDE 9 MG/ML
250 INJECTION, SOLUTION INTRAVENOUS ONCE
Status: CANCELLED | OUTPATIENT
Start: 2017-05-17

## 2017-05-09 RX ORDER — SODIUM CHLORIDE 9 MG/ML
250 INJECTION, SOLUTION INTRAVENOUS ONCE
Status: CANCELLED | OUTPATIENT
Start: 2017-05-16

## 2017-05-09 RX ORDER — TEMAZEPAM 15 MG/1
CAPSULE ORAL
Refills: 2 | COMMUNITY
Start: 2017-03-29 | End: 2017-01-01

## 2017-05-09 RX ORDER — PALONOSETRON 0.05 MG/ML
0.25 INJECTION, SOLUTION INTRAVENOUS ONCE
Status: CANCELLED | OUTPATIENT
Start: 2017-05-16

## 2017-05-09 RX ORDER — SODIUM CHLORIDE 9 MG/ML
250 INJECTION, SOLUTION INTRAVENOUS ONCE
Status: CANCELLED | OUTPATIENT
Start: 2017-06-07

## 2017-05-16 ENCOUNTER — DOCUMENTATION (OUTPATIENT)
Dept: NUTRITION | Facility: HOSPITAL | Age: 62
End: 2017-05-16

## 2017-05-16 ENCOUNTER — INFUSION (OUTPATIENT)
Dept: ONCOLOGY | Facility: HOSPITAL | Age: 62
End: 2017-05-16

## 2017-05-16 VITALS
RESPIRATION RATE: 18 BRPM | SYSTOLIC BLOOD PRESSURE: 139 MMHG | WEIGHT: 188.5 LBS | HEART RATE: 76 BPM | TEMPERATURE: 96.5 F | DIASTOLIC BLOOD PRESSURE: 89 MMHG | BODY MASS INDEX: 30.42 KG/M2

## 2017-05-16 DIAGNOSIS — C34.91 SMALL CELL CARCINOMA OF RIGHT LUNG (HCC): Primary | ICD-10-CM

## 2017-05-16 LAB
ALBUMIN SERPL-MCNC: 4.2 G/DL (ref 3.2–4.8)
ALBUMIN/GLOB SERPL: 2 G/DL (ref 1.5–2.5)
ALP SERPL-CCNC: 103 U/L (ref 25–100)
ALT SERPL W P-5'-P-CCNC: 26 U/L (ref 7–40)
ANION GAP SERPL CALCULATED.3IONS-SCNC: 6 MMOL/L (ref 3–11)
AST SERPL-CCNC: 25 U/L (ref 0–33)
BILIRUB SERPL-MCNC: 0.4 MG/DL (ref 0.3–1.2)
BUN BLD-MCNC: 13 MG/DL (ref 9–23)
BUN/CREAT SERPL: 16.3 (ref 7–25)
CALCIUM SPEC-SCNC: 9.8 MG/DL (ref 8.7–10.4)
CHLORIDE SERPL-SCNC: 106 MMOL/L (ref 99–109)
CO2 SERPL-SCNC: 26 MMOL/L (ref 20–31)
CREAT BLD-MCNC: 0.8 MG/DL (ref 0.6–1.3)
CREAT BLDA-MCNC: 0.8 MG/DL (ref 0.6–1.3)
ERYTHROCYTE [DISTWIDTH] IN BLOOD BY AUTOMATED COUNT: 20.9 % (ref 11.3–14.5)
GFR SERPL CREATININE-BSD FRML MDRD: 72 ML/MIN/1.73
GLOBULIN UR ELPH-MCNC: 2.1 GM/DL
GLUCOSE BLD-MCNC: 145 MG/DL (ref 70–100)
HCT VFR BLD AUTO: 37.1 % (ref 34.5–44)
HGB BLD-MCNC: 11.8 G/DL (ref 11.5–15.5)
LYMPHOCYTES # BLD AUTO: 0.9 10*3/MM3 (ref 0.6–4.8)
LYMPHOCYTES NFR BLD AUTO: 14.8 % (ref 24–44)
MAGNESIUM SERPL-MCNC: 1.7 MG/DL (ref 1.3–2.7)
MCH RBC QN AUTO: 29.1 PG (ref 27–31)
MCHC RBC AUTO-ENTMCNC: 31.9 G/DL (ref 32–36)
MCV RBC AUTO: 91.3 FL (ref 80–99)
MONOCYTES # BLD AUTO: 0.3 10*3/MM3 (ref 0–1)
MONOCYTES NFR BLD AUTO: 4.7 % (ref 0–12)
NEUTROPHILS # BLD AUTO: 4.7 10*3/MM3 (ref 1.5–8.3)
NEUTROPHILS NFR BLD AUTO: 80.5 % (ref 41–71)
PLATELET # BLD AUTO: 178 10*3/MM3 (ref 150–450)
PMV BLD AUTO: 8.3 FL (ref 6–12)
POTASSIUM BLD-SCNC: 3.8 MMOL/L (ref 3.5–5.5)
PROT SERPL-MCNC: 6.3 G/DL (ref 5.7–8.2)
RBC # BLD AUTO: 4.06 10*6/MM3 (ref 3.89–5.14)
SODIUM BLD-SCNC: 138 MMOL/L (ref 132–146)
WBC NRBC COR # BLD: 5.8 10*3/MM3 (ref 3.5–10.8)

## 2017-05-16 PROCEDURE — 96375 TX/PRO/DX INJ NEW DRUG ADDON: CPT

## 2017-05-16 PROCEDURE — 25010000002 PALONOSETRON PER 25 MCG: Performed by: INTERNAL MEDICINE

## 2017-05-16 PROCEDURE — 82565 ASSAY OF CREATININE: CPT

## 2017-05-16 PROCEDURE — 25010000002 MAGNESIUM SULFATE PER 500 MG OF MAGNESIUM: Performed by: INTERNAL MEDICINE

## 2017-05-16 PROCEDURE — 96367 TX/PROPH/DG ADDL SEQ IV INF: CPT

## 2017-05-16 PROCEDURE — 25010000002 DEXAMETHASONE PER 1 MG: Performed by: INTERNAL MEDICINE

## 2017-05-16 PROCEDURE — 85025 COMPLETE CBC W/AUTO DIFF WBC: CPT

## 2017-05-16 PROCEDURE — 80053 COMPREHEN METABOLIC PANEL: CPT

## 2017-05-16 PROCEDURE — 96366 THER/PROPH/DIAG IV INF ADDON: CPT

## 2017-05-16 PROCEDURE — 25010000002 FOSAPREPITANT PER 1 MG: Performed by: INTERNAL MEDICINE

## 2017-05-16 PROCEDURE — 96415 CHEMO IV INFUSION ADDL HR: CPT

## 2017-05-16 PROCEDURE — 25010000002 CISPLATIN PER 50 MG: Performed by: INTERNAL MEDICINE

## 2017-05-16 PROCEDURE — 96413 CHEMO IV INFUSION 1 HR: CPT

## 2017-05-16 PROCEDURE — 96417 CHEMO IV INFUS EACH ADDL SEQ: CPT

## 2017-05-16 PROCEDURE — 96361 HYDRATE IV INFUSION ADD-ON: CPT

## 2017-05-16 PROCEDURE — 25010000002 POTASSIUM CHLORIDE PER 2 MEQ OF POTASSIUM: Performed by: INTERNAL MEDICINE

## 2017-05-16 PROCEDURE — 83735 ASSAY OF MAGNESIUM: CPT

## 2017-05-16 PROCEDURE — 25010000002 ETOPOSIDE 100 MG/5ML SOLUTION 50 ML VIAL: Performed by: INTERNAL MEDICINE

## 2017-05-16 RX ORDER — PALONOSETRON 0.05 MG/ML
0.25 INJECTION, SOLUTION INTRAVENOUS ONCE
Status: COMPLETED | OUTPATIENT
Start: 2017-05-16 | End: 2017-05-16

## 2017-05-16 RX ORDER — OXYCODONE HYDROCHLORIDE 5 MG/1
5 TABLET ORAL EVERY 4 HOURS PRN
Qty: 20 TABLET | Refills: 0 | Status: SHIPPED | OUTPATIENT
Start: 2017-05-16 | End: 2017-06-06 | Stop reason: SDUPTHER

## 2017-05-16 RX ORDER — SODIUM CHLORIDE 9 MG/ML
250 INJECTION, SOLUTION INTRAVENOUS ONCE
Status: DISCONTINUED | OUTPATIENT
Start: 2017-05-16 | End: 2017-05-16 | Stop reason: HOSPADM

## 2017-05-16 RX ADMIN — POTASSIUM CHLORIDE 1000 ML: 2 INJECTION, SOLUTION, CONCENTRATE INTRAVENOUS at 08:13

## 2017-05-16 RX ADMIN — DEXAMETHASONE SODIUM PHOSPHATE 12 MG: 4 INJECTION, SOLUTION INTRAMUSCULAR; INTRAVENOUS at 10:10

## 2017-05-16 RX ADMIN — CISPLATIN 144 MG: 100 INJECTION, SOLUTION INTRAVENOUS at 10:35

## 2017-05-16 RX ADMIN — SODIUM CHLORIDE 150 MG: 9 INJECTION, SOLUTION INTRAVENOUS at 10:10

## 2017-05-16 RX ADMIN — POTASSIUM CHLORIDE 1000 ML: 2 INJECTION, SOLUTION, CONCENTRATE INTRAVENOUS at 11:40

## 2017-05-16 RX ADMIN — PALONOSETRON HYDROCHLORIDE 0.25 MG: 0.25 INJECTION INTRAVENOUS at 10:08

## 2017-05-16 RX ADMIN — ETOPOSIDE 190 MG: 20 INJECTION, SOLUTION, CONCENTRATE INTRAVENOUS at 11:41

## 2017-05-17 ENCOUNTER — HOSPITAL ENCOUNTER (OUTPATIENT)
Dept: RADIATION ONCOLOGY | Facility: HOSPITAL | Age: 62
Setting detail: RADIATION/ONCOLOGY SERIES
Discharge: HOME | End: 2017-05-17

## 2017-05-17 ENCOUNTER — OFFICE VISIT (OUTPATIENT)
Dept: RADIATION ONCOLOGY | Facility: HOSPITAL | Age: 62
End: 2017-05-17

## 2017-05-17 ENCOUNTER — INFUSION (OUTPATIENT)
Dept: ONCOLOGY | Facility: HOSPITAL | Age: 62
End: 2017-05-17

## 2017-05-17 VITALS
DIASTOLIC BLOOD PRESSURE: 102 MMHG | HEART RATE: 84 BPM | WEIGHT: 190 LBS | SYSTOLIC BLOOD PRESSURE: 179 MMHG | BODY MASS INDEX: 30.53 KG/M2 | RESPIRATION RATE: 16 BRPM | HEIGHT: 66 IN | TEMPERATURE: 98.1 F

## 2017-05-17 DIAGNOSIS — C34.91 SMALL CELL CARCINOMA OF RIGHT LUNG (HCC): Primary | ICD-10-CM

## 2017-05-17 DIAGNOSIS — C34.11 MALIGNANT NEOPLASM OF UPPER LOBE OF RIGHT LUNG (HCC): Primary | ICD-10-CM

## 2017-05-17 PROCEDURE — 25010000002 ETOPOSIDE 100 MG/5ML SOLUTION 50 ML VIAL: Performed by: INTERNAL MEDICINE

## 2017-05-17 PROCEDURE — 96413 CHEMO IV INFUSION 1 HR: CPT

## 2017-05-17 RX ORDER — SODIUM CHLORIDE 0.9 % (FLUSH) 0.9 %
10 SYRINGE (ML) INJECTION AS NEEDED
Status: CANCELLED | OUTPATIENT
Start: 2017-05-17

## 2017-05-17 RX ORDER — SODIUM CHLORIDE 9 MG/ML
250 INJECTION, SOLUTION INTRAVENOUS ONCE
Status: COMPLETED | OUTPATIENT
Start: 2017-05-17 | End: 2017-05-17

## 2017-05-17 RX ADMIN — SODIUM CHLORIDE 250 ML: 9 INJECTION, SOLUTION INTRAVENOUS at 10:45

## 2017-05-17 RX ADMIN — ETOPOSIDE 190 MG: 20 INJECTION, SOLUTION, CONCENTRATE INTRAVENOUS at 10:46

## 2017-05-18 ENCOUNTER — INFUSION (OUTPATIENT)
Dept: ONCOLOGY | Facility: HOSPITAL | Age: 62
End: 2017-05-18

## 2017-05-18 VITALS
HEIGHT: 66 IN | WEIGHT: 190 LBS | BODY MASS INDEX: 30.53 KG/M2 | DIASTOLIC BLOOD PRESSURE: 68 MMHG | HEART RATE: 94 BPM | RESPIRATION RATE: 16 BRPM | SYSTOLIC BLOOD PRESSURE: 133 MMHG | TEMPERATURE: 97.2 F

## 2017-05-18 DIAGNOSIS — C34.91 SMALL CELL CARCINOMA OF RIGHT LUNG (HCC): Primary | ICD-10-CM

## 2017-05-18 PROCEDURE — 96413 CHEMO IV INFUSION 1 HR: CPT

## 2017-05-18 PROCEDURE — 25010000002 ETOPOSIDE 100 MG/5ML SOLUTION 50 ML VIAL: Performed by: INTERNAL MEDICINE

## 2017-05-18 RX ORDER — SODIUM CHLORIDE 9 MG/ML
250 INJECTION, SOLUTION INTRAVENOUS ONCE
Status: COMPLETED | OUTPATIENT
Start: 2017-05-18 | End: 2017-05-18

## 2017-05-18 RX ADMIN — SODIUM CHLORIDE 250 ML: 9 INJECTION, SOLUTION INTRAVENOUS at 13:26

## 2017-05-18 RX ADMIN — ETOPOSIDE 190 MG: 20 INJECTION, SOLUTION, CONCENTRATE INTRAVENOUS at 13:30

## 2017-05-25 ENCOUNTER — INFUSION (OUTPATIENT)
Dept: ONCOLOGY | Facility: HOSPITAL | Age: 62
End: 2017-05-25

## 2017-05-25 VITALS
DIASTOLIC BLOOD PRESSURE: 87 MMHG | WEIGHT: 187 LBS | HEART RATE: 88 BPM | SYSTOLIC BLOOD PRESSURE: 137 MMHG | TEMPERATURE: 97.5 F | BODY MASS INDEX: 30.05 KG/M2 | RESPIRATION RATE: 66 BRPM | HEIGHT: 66 IN

## 2017-05-25 DIAGNOSIS — C34.91 SMALL CELL CARCINOMA OF RIGHT LUNG (HCC): Primary | ICD-10-CM

## 2017-05-25 PROCEDURE — G0463 HOSPITAL OUTPT CLINIC VISIT: HCPCS

## 2017-05-25 RX ORDER — SODIUM CHLORIDE 0.9 % (FLUSH) 0.9 %
10 SYRINGE (ML) INJECTION AS NEEDED
Status: CANCELLED | OUTPATIENT
Start: 2017-05-25

## 2017-05-25 RX ORDER — SODIUM CHLORIDE 0.9 % (FLUSH) 0.9 %
10 SYRINGE (ML) INJECTION AS NEEDED
Status: DISCONTINUED | OUTPATIENT
Start: 2017-05-25 | End: 2017-05-25 | Stop reason: HOSPADM

## 2017-05-30 RX ORDER — AMLODIPINE BESYLATE 5 MG/1
TABLET ORAL
Qty: 30 TABLET | Refills: 0 | OUTPATIENT
Start: 2017-05-30

## 2017-06-01 ENCOUNTER — INFUSION (OUTPATIENT)
Dept: ONCOLOGY | Facility: HOSPITAL | Age: 62
End: 2017-06-01

## 2017-06-01 VITALS
BODY MASS INDEX: 30.22 KG/M2 | SYSTOLIC BLOOD PRESSURE: 144 MMHG | RESPIRATION RATE: 16 BRPM | DIASTOLIC BLOOD PRESSURE: 87 MMHG | HEART RATE: 94 BPM | HEIGHT: 66 IN | WEIGHT: 188 LBS | TEMPERATURE: 97.7 F

## 2017-06-01 DIAGNOSIS — C34.91 SMALL CELL CARCINOMA OF RIGHT LUNG (HCC): ICD-10-CM

## 2017-06-01 PROCEDURE — G0463 HOSPITAL OUTPT CLINIC VISIT: HCPCS

## 2017-06-06 ENCOUNTER — OFFICE VISIT (OUTPATIENT)
Dept: ONCOLOGY | Facility: CLINIC | Age: 62
End: 2017-06-06

## 2017-06-06 ENCOUNTER — INFUSION (OUTPATIENT)
Dept: ONCOLOGY | Facility: HOSPITAL | Age: 62
End: 2017-06-06

## 2017-06-06 VITALS
BODY MASS INDEX: 30.37 KG/M2 | HEIGHT: 66 IN | SYSTOLIC BLOOD PRESSURE: 156 MMHG | RESPIRATION RATE: 16 BRPM | DIASTOLIC BLOOD PRESSURE: 86 MMHG | HEART RATE: 76 BPM | WEIGHT: 189 LBS | TEMPERATURE: 97.1 F

## 2017-06-06 DIAGNOSIS — C34.91 SMALL CELL CARCINOMA OF RIGHT LUNG (HCC): Primary | ICD-10-CM

## 2017-06-06 LAB
ALBUMIN SERPL-MCNC: 4 G/DL (ref 3.2–4.8)
ALBUMIN/GLOB SERPL: 1.8 G/DL (ref 1.5–2.5)
ALP SERPL-CCNC: 114 U/L (ref 25–100)
ALT SERPL W P-5'-P-CCNC: 27 U/L (ref 7–40)
ANION GAP SERPL CALCULATED.3IONS-SCNC: 3 MMOL/L (ref 3–11)
AST SERPL-CCNC: 24 U/L (ref 0–33)
BILIRUB SERPL-MCNC: 0.2 MG/DL (ref 0.3–1.2)
BUN BLD-MCNC: 11 MG/DL (ref 9–23)
BUN/CREAT SERPL: 13.8 (ref 7–25)
CALCIUM SPEC-SCNC: 9.9 MG/DL (ref 8.7–10.4)
CHLORIDE SERPL-SCNC: 110 MMOL/L (ref 99–109)
CO2 SERPL-SCNC: 29 MMOL/L (ref 20–31)
CREAT BLD-MCNC: 0.8 MG/DL (ref 0.6–1.3)
CREAT BLDA-MCNC: 0.8 MG/DL (ref 0.6–1.3)
ERYTHROCYTE [DISTWIDTH] IN BLOOD BY AUTOMATED COUNT: 19.9 % (ref 11.3–14.5)
GFR SERPL CREATININE-BSD FRML MDRD: 72 ML/MIN/1.73
GLOBULIN UR ELPH-MCNC: 2.2 GM/DL
GLUCOSE BLD-MCNC: 95 MG/DL (ref 70–100)
HCT VFR BLD AUTO: 35 % (ref 34.5–44)
HGB BLD-MCNC: 11.3 G/DL (ref 11.5–15.5)
LYMPHOCYTES # BLD AUTO: 1 10*3/MM3 (ref 0.6–4.8)
LYMPHOCYTES NFR BLD AUTO: 38.2 % (ref 24–44)
MAGNESIUM SERPL-MCNC: 1.8 MG/DL (ref 1.3–2.7)
MCH RBC QN AUTO: 29.5 PG (ref 27–31)
MCHC RBC AUTO-ENTMCNC: 32.2 G/DL (ref 32–36)
MCV RBC AUTO: 91.7 FL (ref 80–99)
MONOCYTES # BLD AUTO: 0.3 10*3/MM3 (ref 0–1)
MONOCYTES NFR BLD AUTO: 12.7 % (ref 0–12)
NEUTROPHILS # BLD AUTO: 1.3 10*3/MM3 (ref 1.5–8.3)
NEUTROPHILS NFR BLD AUTO: 49.1 % (ref 41–71)
PLATELET # BLD AUTO: 285 10*3/MM3 (ref 150–450)
PMV BLD AUTO: 7 FL (ref 6–12)
POTASSIUM BLD-SCNC: 3.9 MMOL/L (ref 3.5–5.5)
PROT SERPL-MCNC: 6.2 G/DL (ref 5.7–8.2)
RBC # BLD AUTO: 3.82 10*6/MM3 (ref 3.89–5.14)
SODIUM BLD-SCNC: 142 MMOL/L (ref 132–146)
WBC NRBC COR # BLD: 2.6 10*3/MM3 (ref 3.5–10.8)

## 2017-06-06 PROCEDURE — 80053 COMPREHEN METABOLIC PANEL: CPT

## 2017-06-06 PROCEDURE — 99215 OFFICE O/P EST HI 40 MIN: CPT | Performed by: INTERNAL MEDICINE

## 2017-06-06 PROCEDURE — 25010000002 MAGNESIUM SULFATE PER 500 MG OF MAGNESIUM: Performed by: INTERNAL MEDICINE

## 2017-06-06 PROCEDURE — 96413 CHEMO IV INFUSION 1 HR: CPT

## 2017-06-06 PROCEDURE — 96360 HYDRATION IV INFUSION INIT: CPT

## 2017-06-06 PROCEDURE — 82565 ASSAY OF CREATININE: CPT

## 2017-06-06 PROCEDURE — 25010000002 DEXAMETHASONE PER 1 MG: Performed by: INTERNAL MEDICINE

## 2017-06-06 PROCEDURE — 96366 THER/PROPH/DIAG IV INF ADDON: CPT

## 2017-06-06 PROCEDURE — 25010000002 CISPLATIN PER 50 MG: Performed by: NURSE PRACTITIONER

## 2017-06-06 PROCEDURE — 96417 CHEMO IV INFUS EACH ADDL SEQ: CPT

## 2017-06-06 PROCEDURE — 96361 HYDRATE IV INFUSION ADD-ON: CPT

## 2017-06-06 PROCEDURE — 25010000002 POTASSIUM CHLORIDE PER 2 MEQ OF POTASSIUM: Performed by: INTERNAL MEDICINE

## 2017-06-06 PROCEDURE — 25010000002 ETOPOSIDE 100 MG/5ML SOLUTION 50 ML VIAL: Performed by: INTERNAL MEDICINE

## 2017-06-06 PROCEDURE — 25010000002 FOSAPREPITANT PER 1 MG: Performed by: INTERNAL MEDICINE

## 2017-06-06 PROCEDURE — 85025 COMPLETE CBC W/AUTO DIFF WBC: CPT

## 2017-06-06 PROCEDURE — 96375 TX/PRO/DX INJ NEW DRUG ADDON: CPT

## 2017-06-06 PROCEDURE — 83735 ASSAY OF MAGNESIUM: CPT

## 2017-06-06 PROCEDURE — 25010000002 PALONOSETRON PER 25 MCG: Performed by: INTERNAL MEDICINE

## 2017-06-06 PROCEDURE — 96367 TX/PROPH/DG ADDL SEQ IV INF: CPT

## 2017-06-06 RX ORDER — CITALOPRAM 40 MG/1
40 TABLET ORAL DAILY
Refills: 0 | COMMUNITY
Start: 2017-05-22

## 2017-06-06 RX ORDER — PALONOSETRON 0.05 MG/ML
0.25 INJECTION, SOLUTION INTRAVENOUS ONCE
Status: COMPLETED | OUTPATIENT
Start: 2017-06-06 | End: 2017-06-06

## 2017-06-06 RX ORDER — OXYCODONE HYDROCHLORIDE 5 MG/1
5 TABLET ORAL EVERY 6 HOURS PRN
Qty: 30 TABLET | Refills: 0 | Status: SHIPPED | OUTPATIENT
Start: 2017-06-06 | End: 2018-01-01

## 2017-06-06 RX ORDER — SODIUM CHLORIDE 9 MG/ML
250 INJECTION, SOLUTION INTRAVENOUS ONCE
Status: DISCONTINUED | OUTPATIENT
Start: 2017-06-06 | End: 2017-06-06 | Stop reason: HOSPADM

## 2017-06-06 RX ORDER — AZITHROMYCIN 250 MG
CAPSULE ORAL
Qty: 6 TABLET | Refills: 0 | Status: SHIPPED | OUTPATIENT
Start: 2017-06-06 | End: 2017-01-01

## 2017-06-06 RX ORDER — OXYCODONE HYDROCHLORIDE 5 MG/1
5 TABLET ORAL EVERY 6 HOURS PRN
Qty: 120 TABLET | Refills: 0 | Status: SHIPPED | OUTPATIENT
Start: 2017-06-06 | End: 2017-06-06 | Stop reason: SDUPTHER

## 2017-06-06 RX ADMIN — POTASSIUM CHLORIDE 1000 ML: 2 INJECTION, SOLUTION, CONCENTRATE INTRAVENOUS at 15:00

## 2017-06-06 RX ADMIN — POTASSIUM CHLORIDE 1000 ML: 2 INJECTION, SOLUTION, CONCENTRATE INTRAVENOUS at 10:24

## 2017-06-06 RX ADMIN — DEXAMETHASONE SODIUM PHOSPHATE 12 MG: 4 INJECTION, SOLUTION INTRAMUSCULAR; INTRAVENOUS at 12:22

## 2017-06-06 RX ADMIN — PALONOSETRON HYDROCHLORIDE 0.25 MG: 0.25 INJECTION INTRAVENOUS at 12:22

## 2017-06-06 RX ADMIN — SODIUM CHLORIDE 150 MG: 9 INJECTION, SOLUTION INTRAVENOUS at 12:22

## 2017-06-06 RX ADMIN — CISPLATIN 108 MG: 100 INJECTION, SOLUTION INTRAVENOUS at 12:49

## 2017-06-06 RX ADMIN — ETOPOSIDE 190 MG: 20 INJECTION, SOLUTION, CONCENTRATE INTRAVENOUS at 13:57

## 2017-06-06 NOTE — PROGRESS NOTES
DATE OF VISIT: 6/6/2017    REASON FOR VISIT: Limited stage small cell lung cancer T2N0M0.       HISTORY OF PRESENT ILLNESS: The patient is a very pleasant 61 y.o. female  with past medical history significant for small cell lung cancer diagnosed 12/30/2016. The patient presented with pneumonia unresponsive to antibiotics. Chest x-ray was completed that revealed a right upper lobe lung mass, confirmed on CT angiogram with multiple pulmonary nodules and left lower lobe infiltrate. He underwent bronchoscopy with biopsies on 12/30/2016 with pathology revealing small kevin lung cancer. The patient had staging work up including PET scan and MRI brain that failed to show evidence of distant metastatic disease. The patient was started on definitive treatment with cisplatin/etoposide with radiation on 2/8/2017. The pateint completed cycle # 4 on 04/21/2017. The patient elected to proceed with two additional cycles of cisplatin/etoposide. She is here today for cycle # 6 of treatment.      SUBJECTIVE: The patient has been doing fairly well. She complains today of sinus congestion and cough, worse over the past week. She has had some subjective fevers, and is coughing up purulent sputum. She is worried she is going to get pneumonia again. She is also complaining of increased pain in her feet, legs, and lower back. She has been using oxycodone as needed, and needs a refill on this medication. She notes she has pain as well as numbness and tingling. She is eating and drinking well. Her breathing is stable. She has no new headaches or blurry vision.     PAST MEDICAL HISTORY/SOCIAL HISTORY/FAMILY HISTORY: Unchanged from my prior documentation done on 01/27/2017.    Review of Systems   Constitutional: Positive for fatigue. Negative for activity change, appetite change, chills, fever and unexpected weight change.   HENT: Positive for postnasal drip and sinus pressure. Negative for hearing loss, mouth sores, nosebleeds, sore throat and  trouble swallowing.    Eyes: Negative for visual disturbance.   Respiratory: Positive for cough. Negative for chest tightness, shortness of breath and wheezing.    Cardiovascular: Negative for chest pain and palpitations.   Gastrointestinal: Negative for abdominal distention, abdominal pain, blood in stool, constipation, diarrhea, rectal pain and vomiting.   Endocrine: Negative for cold intolerance and heat intolerance.   Genitourinary: Negative for difficulty urinating, dysuria, frequency and urgency.   Musculoskeletal: Negative for arthralgias, back pain, gait problem, joint swelling and myalgias.   Skin: Negative for rash.   Neurological: Negative for dizziness, tremors, syncope, weakness, light-headedness, numbness and headaches.   Hematological: Negative for adenopathy. Does not bruise/bleed easily.   Psychiatric/Behavioral: Positive for sleep disturbance. Negative for confusion and suicidal ideas. The patient is not nervous/anxious.          Current Outpatient Prescriptions:   •  acetaminophen (TYLENOL) 500 MG tablet, Take 650 mg by mouth 3 (Three) Times a Day As Needed for mild pain (1-3)., Disp: , Rfl:   •  amLODIPine (NORVASC) 5 MG tablet, TAKE 1 TABLET BY MOUTH EVERY DAY, Disp: 30 tablet, Rfl: 0  •  atorvastatin (LIPITOR) 40 MG tablet, Take 40 mg by mouth Every Night., Disp: , Rfl:   •  citalopram (CeleXA) 40 MG tablet, Take 1 tablet by mouth Daily., Disp: , Rfl: 0  •  dexamethasone (DECADRON) 4 MG tablet, Take 1 tablet by mouth Take As Directed. Take 2 tablets in the morning on day 2, then take 2 tablets BID on days 3 and 4 chemo, Disp: 40 tablet, Rfl: 5  •  Fluticasone Furoate-Vilanterol (BREO ELLIPTA) 100-25 MCG/INH aerosol powder , Inhale 1 puff Daily., Disp: 1 each, Rfl: 11  •  levothyroxine (SYNTHROID, LEVOTHROID) 88 MCG tablet, Take 88 mcg by mouth Daily., Disp: , Rfl:   •  LORazepam (ATIVAN) 1 MG tablet, TAKE 1 TABLET BY MOUTH EVERY 8 HOURS AS NEEDED FOR ANXIETY, Disp: 90 tablet, Rfl: 0  •   "Naproxen Sodium (ALEVE PO), Take  by mouth., Disp: , Rfl:   •  nystatin susp + lidocaine viscous (MAGIC MOUTHWASH) oral suspension, 5-10 ml swish and spit or swallow QID prn, Disp: 240 mL, Rfl: 3  •  omeprazole (priLOSEC) 40 MG capsule, Take 1 capsule by mouth Daily., Disp: 30 capsule, Rfl: 5  •  ondansetron (ZOFRAN) 8 MG tablet, Take 1 tablet by mouth Every 8 (Eight) Hours As Needed for nausea or vomiting for up to 60 doses., Disp: 30 tablet, Rfl: 5  •  oxyCODONE (ROXICODONE) 5 MG immediate release tablet, Take 1 tablet by mouth Every 6 (Six) Hours As Needed for Moderate Pain (4-6)., Disp: 30 tablet, Rfl: 0  •  Polyethylene Glycol 3350 (MIRALAX PO), Take  by mouth., Disp: , Rfl:   •  promethazine (PHENERGAN) 25 MG tablet, Take 1 tablet by mouth Every 6 (Six) Hours As Needed for nausea or vomiting for up to 60 doses., Disp: 45 tablet, Rfl: 5  •  saccharomyces boulardii (FLORASTOR) 250 MG capsule, Take 1 capsule by mouth 2 (Two) Times a Day., Disp: 60 capsule, Rfl: 0  •  temazepam (RESTORIL) 15 MG capsule, TK 1 C PO QD HS PRF SLEEP, Disp: , Rfl: 2  •  ZITHROMAX Z-IVONNE 250 MG tablet, Take 2 tablets the first day, then 1 tablet daily for 4 days., Disp: 6 tablet, Rfl: 0  •  zolpidem (AMBIEN) 10 MG tablet, Take 1 tablet by mouth Every Night., Disp: 30 tablet, Rfl: 2    PHYSICAL EXAMINATION:   /86 Comment: LUE  Pulse 76  Temp 97.1 °F (36.2 °C) (Temporal Artery )   Resp 16  Ht 66\" (167.6 cm)  Wt 189 lb (85.7 kg)  BMI 30.51 kg/m2   ECOG Performance Status: 1 - Symptomatic but completely ambulatory  General Appearance:  alert, cooperative, no apparent distress and appears stated age   Neurologic/Psychiatric: A&O x 3, gait steady, appropriate affect, strength 5/5 in all muscle groups   HEENT:  Normocephalic, without obvious abnormality, mucous membranes moist   Neck: Supple, symmetrical, trachea midline, no adenopathy;  No thyromegaly, masses, or tenderness   Lungs:   Clear to auscultation bilaterally; respirations " regular, even, and unlabored bilaterally   Heart:  Regular rate and rhythm, no murmurs appreciated   Abdomen:   Soft, non-tender, non-distended and no organomegaly   Lymph nodes: No cervical, supraclavicular, inguinal or axillary adenopathy noted   Extremities: Normal, atraumatic; no clubbing, cyanosis, or edema    Skin: No rashes, ulcers, or suspicious lesions noted     No visits with results within 2 Week(s) from this visit.  Latest known visit with results is:    Infusion on 05/16/2017   Component Date Value Ref Range Status   • Glucose 05/16/2017 145* 70 - 100 mg/dL Final   • BUN 05/16/2017 13  9 - 23 mg/dL Final   • Creatinine 05/16/2017 0.80  0.60 - 1.30 mg/dL Final   • Sodium 05/16/2017 138  132 - 146 mmol/L Final   • Potassium 05/16/2017 3.8  3.5 - 5.5 mmol/L Final   • Chloride 05/16/2017 106  99 - 109 mmol/L Final   • CO2 05/16/2017 26.0  20.0 - 31.0 mmol/L Final   • Calcium 05/16/2017 9.8  8.7 - 10.4 mg/dL Final   • Total Protein 05/16/2017 6.3  5.7 - 8.2 g/dL Final   • Albumin 05/16/2017 4.20  3.20 - 4.80 g/dL Final   • ALT (SGPT) 05/16/2017 26  7 - 40 U/L Final   • AST (SGOT) 05/16/2017 25  0 - 33 U/L Final   • Alkaline Phosphatase 05/16/2017 103* 25 - 100 U/L Final   • Total Bilirubin 05/16/2017 0.4  0.3 - 1.2 mg/dL Final   • eGFR Non African Amer 05/16/2017 72  >60 mL/min/1.73 Final   • Globulin 05/16/2017 2.1  gm/dL Final   • A/G Ratio 05/16/2017 2.0  1.5 - 2.5 g/dL Final   • BUN/Creatinine Ratio 05/16/2017 16.3  7.0 - 25.0 Final   • Anion Gap 05/16/2017 6.0  3.0 - 11.0 mmol/L Final   • Magnesium 05/16/2017 1.7  1.3 - 2.7 mg/dL Final   • WBC 05/16/2017 5.80  3.50 - 10.80 10*3/mm3 Final   • RBC 05/16/2017 4.06  3.89 - 5.14 10*6/mm3 Final   • Hemoglobin 05/16/2017 11.8  11.5 - 15.5 g/dL Final   • Hematocrit 05/16/2017 37.1  34.5 - 44.0 % Final   • RDW 05/16/2017 20.9* 11.3 - 14.5 % Final   • MCV 05/16/2017 91.3  80.0 - 99.0 fL Final   • MCH 05/16/2017 29.1  27.0 - 31.0 pg Final   • MCHC 05/16/2017 31.9*  32.0 - 36.0 g/dL Final   • MPV 05/16/2017 8.3  6.0 - 12.0 fL Final   • Platelets 05/16/2017 178  150 - 450 10*3/mm3 Final   • Neutrophil % 05/16/2017 80.5* 41.0 - 71.0 % Final   • Lymphocyte % 05/16/2017 14.8* 24.0 - 44.0 % Final   • Monocyte % 05/16/2017 4.7  0.0 - 12.0 % Final   • Neutrophils, Absolute 05/16/2017 4.70  1.50 - 8.30 10*3/mm3 Final   • Lymphocytes, Absolute 05/16/2017 0.90  0.60 - 4.80 10*3/mm3 Final   • Monocytes, Absolute 05/16/2017 0.30  0.00 - 1.00 10*3/mm3 Final   • Creatinine 05/16/2017 0.80  0.60 - 1.30 mg/dL Final    Serial Number: [id number]    : [id number]        No results found.    ASSESSMENT: The patient is a very pleasant 61 y.o. female with small cell lung cancer.     PROBLEM LIST:  1. Presented with cough, shortness of breath, and pneumonia.   2. Right upper lobe mass with multiple scattered pulmonary nodules bilaterally.   3. Status post bronchoscopy with biopsy revealing small cell lung cancer.   4. PET scan done 2/2/2017 shows disease in the right upper lobe with no evidence of distant metastatic disease. MRI brain negative on 2/2/2017  5.  Started definitive chemotherapy with radiation using cisplatin and etoposide on February 7, 2017.  Status post 5 cycles.  6. COPD  7. History of cervical cancer status post hysterectomy.  8. History of tonsillar cancer status post chemo/radiation.   9. Hypertension.  10. Anxiety and depression.  11. Hypothyroidism.  12. 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  I will proceed with chemotherapy today as scheduled with cycle # 6 of cisplatin/etoposide.    2. We will reduce her Cisplatin dose by 25% secondary to complaints of neuropathy.   3.  The patient will follow-up with me in 455 weeks with repeat CAT scans prior to return.    4. The patient will continue Zofran to be used as needed for nausea.   5. We will monitor the patient's blood counts, kidney functions, and liver functions throughout treatment.   6. We will continue ack  to Ambien 10 mg at bedtime for insomnia. She did not need a refill on this medication today.    7. We reviewed the potential side effects of this regimen including fatigue, vomiting and nausea, hair loss, nephropathy, neuropathy, hearing loss, myelosuppression, and risk of infusion reaction.  8. We will give the patient a prescription for Z-pack for sinusitis and upper respiratory infection.   9. She was given a refill on oxycodone 5 mg take 1 tab by mouth every 6 hours as needed for pain, # 30 with no refills.     Scribed for Zay Tan MD by HELEN Paige. 6/6/2017  9:24 AM  Zay Tan MD  6/6/2017   I, Zay Tan MD, personally performed the services described in this documentation as scribed by the above named individual in my presence, and it is both accurate and complete.  6/6/2017  9:24 AM

## 2017-06-07 ENCOUNTER — INFUSION (OUTPATIENT)
Dept: ONCOLOGY | Facility: HOSPITAL | Age: 62
End: 2017-06-07

## 2017-06-07 VITALS
TEMPERATURE: 97.8 F | DIASTOLIC BLOOD PRESSURE: 93 MMHG | BODY MASS INDEX: 30.53 KG/M2 | RESPIRATION RATE: 16 BRPM | WEIGHT: 190 LBS | HEART RATE: 101 BPM | HEIGHT: 66 IN | SYSTOLIC BLOOD PRESSURE: 159 MMHG

## 2017-06-07 DIAGNOSIS — C34.91 SMALL CELL CARCINOMA OF RIGHT LUNG (HCC): ICD-10-CM

## 2017-06-07 DIAGNOSIS — C34.91 SMALL CELL CARCINOMA OF RIGHT LUNG (HCC): Primary | ICD-10-CM

## 2017-06-07 PROCEDURE — 25010000002 ETOPOSIDE 100 MG/5ML SOLUTION 5 ML VIAL: Performed by: NURSE PRACTITIONER

## 2017-06-07 PROCEDURE — 96413 CHEMO IV INFUSION 1 HR: CPT

## 2017-06-07 RX ORDER — SODIUM CHLORIDE 9 MG/ML
250 INJECTION, SOLUTION INTRAVENOUS ONCE
Status: DISCONTINUED | OUTPATIENT
Start: 2017-06-07 | End: 2017-06-08 | Stop reason: HOSPADM

## 2017-06-07 RX ADMIN — ETOPOSIDE 190 MG: 20 INJECTION, SOLUTION, CONCENTRATE INTRAVENOUS at 14:30

## 2017-06-08 ENCOUNTER — TELEPHONE (OUTPATIENT)
Dept: ONCOLOGY | Facility: CLINIC | Age: 62
End: 2017-06-08

## 2017-06-08 ENCOUNTER — INFUSION (OUTPATIENT)
Dept: ONCOLOGY | Facility: HOSPITAL | Age: 62
End: 2017-06-08

## 2017-06-08 VITALS
WEIGHT: 192 LBS | BODY MASS INDEX: 30.86 KG/M2 | DIASTOLIC BLOOD PRESSURE: 72 MMHG | HEART RATE: 87 BPM | TEMPERATURE: 97 F | SYSTOLIC BLOOD PRESSURE: 124 MMHG | HEIGHT: 66 IN | RESPIRATION RATE: 16 BRPM

## 2017-06-08 DIAGNOSIS — C34.91 SMALL CELL CARCINOMA OF RIGHT LUNG (HCC): Primary | ICD-10-CM

## 2017-06-08 PROCEDURE — 96377 APPLICATON ON-BODY INJECTOR: CPT

## 2017-06-08 PROCEDURE — 25010000002 PEGFILGRASTIM 6 MG/0.6ML PREFILLED SYRINGE KIT: Performed by: INTERNAL MEDICINE

## 2017-06-08 PROCEDURE — 96413 CHEMO IV INFUSION 1 HR: CPT

## 2017-06-08 PROCEDURE — 25010000002 ETOPOSIDE 100 MG/5ML SOLUTION 5 ML VIAL: Performed by: INTERNAL MEDICINE

## 2017-06-08 RX ORDER — SODIUM CHLORIDE 9 MG/ML
250 INJECTION, SOLUTION INTRAVENOUS ONCE
Status: DISCONTINUED | OUTPATIENT
Start: 2017-06-08 | End: 2017-06-08 | Stop reason: HOSPADM

## 2017-06-08 RX ADMIN — PEGFILGRASTIM 6 MG: KIT SUBCUTANEOUS at 14:56

## 2017-06-08 RX ADMIN — ETOPOSIDE 190 MG: 20 INJECTION, SOLUTION, CONCENTRATE INTRAVENOUS at 13:52

## 2017-07-06 RX ORDER — ZOLPIDEM TARTRATE 10 MG/1
TABLET ORAL
Qty: 30 TABLET | Refills: 0 | OUTPATIENT
Start: 2017-07-06 | End: 2018-01-01 | Stop reason: ALTCHOICE

## 2017-07-07 ENCOUNTER — HOSPITAL ENCOUNTER (OUTPATIENT)
Dept: CT IMAGING | Facility: HOSPITAL | Age: 62
Discharge: HOME | End: 2017-07-07
Admitting: NURSE PRACTITIONER

## 2017-07-07 DIAGNOSIS — C34.91 SMALL CELL CARCINOMA OF RIGHT LUNG (HCC): ICD-10-CM

## 2017-07-07 PROCEDURE — 82565 ASSAY OF CREATININE: CPT

## 2017-07-07 PROCEDURE — 71260 CT THORAX DX C+: CPT

## 2017-07-07 PROCEDURE — 0 IOPAMIDOL 61 % SOLUTION: Performed by: NURSE PRACTITIONER

## 2017-07-07 PROCEDURE — 74177 CT ABD & PELVIS W/CONTRAST: CPT

## 2017-07-07 RX ADMIN — IOPAMIDOL 60 ML: 612 INJECTION, SOLUTION INTRAVENOUS at 09:54

## 2017-07-10 LAB — CREAT BLDA-MCNC: 0.9 MG/DL (ref 0.6–1.3)

## 2017-07-11 ENCOUNTER — OFFICE VISIT (OUTPATIENT)
Dept: ONCOLOGY | Facility: CLINIC | Age: 62
End: 2017-07-11

## 2017-07-11 VITALS
RESPIRATION RATE: 16 BRPM | DIASTOLIC BLOOD PRESSURE: 90 MMHG | HEIGHT: 66 IN | SYSTOLIC BLOOD PRESSURE: 136 MMHG | BODY MASS INDEX: 30.22 KG/M2 | HEART RATE: 80 BPM | TEMPERATURE: 97.5 F | WEIGHT: 188 LBS

## 2017-07-11 DIAGNOSIS — C34.91 SMALL CELL CARCINOMA OF RIGHT LUNG (HCC): Primary | ICD-10-CM

## 2017-07-11 PROCEDURE — 99214 OFFICE O/P EST MOD 30 MIN: CPT | Performed by: INTERNAL MEDICINE

## 2017-07-11 RX ORDER — AMLODIPINE BESYLATE 10 MG/1
TABLET ORAL
Refills: 5 | COMMUNITY
Start: 2017-07-08 | End: 2017-01-01

## 2017-07-11 NOTE — PROGRESS NOTES
DATE OF VISIT: 7/11/2017    REASON FOR VISIT: Limited stage small cell lung cancer [id number].       HISTORY OF PRESENT ILLNESS: The patient is a very pleasant 61 y.o. female  with past medical history significant for small cell lung cancer diagnosed 12/30/2016. The patient presented with pneumonia unresponsive to antibiotics. Chest x-ray was completed that revealed a right upper lobe lung mass, confirmed on CT angiogram with multiple pulmonary nodules and left lower lobe infiltrate. He underwent bronchoscopy with biopsies on 12/30/2016 with pathology revealing small kevin lung cancer. The patient had staging work up including PET scan and MRI brain that failed to show evidence of distant metastatic disease. The patient was started on definitive treatment with cisplatin/etoposide with radiation on 2/8/2017. The pateint completed cycle # 4 on 04/21/2017. The patient elected to proceed with two additional cycles of cisplatin/etoposide, and completed cycle #6 on 6/8/2017. She is here today for scheduled follow up visit.       SUBJECTIVE: The patient has been doing fairly well. She is still having some significant fatigue as well as pain from her neuropathy and arthritis. She is somewhat frustrated that she hasn't gotten back to her baseline activity level yet. She has tried Tylenol and Aleve with no relief in symptoms. She notes the pain is so significant that she has trouble with her daily activities. She is eating and drinking well. She has no ne headaches or dizziness. Her breathing is stable.      PAST MEDICAL HISTORY/SOCIAL HISTORY/FAMILY HISTORY: Unchanged from my prior documentation done on 01/27/2017.    Review of Systems   Constitutional: Positive for fatigue. Negative for activity change, appetite change, chills, fever and unexpected weight change.   HENT: Negative for hearing loss, mouth sores, nosebleeds, sore throat and trouble swallowing.    Eyes: Negative for visual disturbance.   Respiratory: Positive for  cough. Negative for chest tightness, shortness of breath and wheezing.    Cardiovascular: Negative for chest pain and palpitations.   Gastrointestinal: Negative for abdominal distention, abdominal pain, blood in stool, constipation, diarrhea, rectal pain and vomiting.   Endocrine: Negative for cold intolerance and heat intolerance.   Genitourinary: Negative for difficulty urinating, dysuria, frequency and urgency.   Musculoskeletal: Positive for arthralgias. Negative for back pain, gait problem, joint swelling and myalgias.   Skin: Negative for rash.   Neurological: Positive for numbness. Negative for dizziness, tremors, syncope, weakness, light-headedness and headaches.   Hematological: Negative for adenopathy. Does not bruise/bleed easily.   Psychiatric/Behavioral: Positive for sleep disturbance. Negative for confusion and suicidal ideas. The patient is not nervous/anxious.          Current Outpatient Prescriptions:   •  acetaminophen (TYLENOL) 500 MG tablet, Take 650 mg by mouth 3 (Three) Times a Day As Needed for mild pain (1-3)., Disp: , Rfl:   •  amLODIPine (NORVASC) 10 MG tablet, TK 1 T PO D, Disp: , Rfl: 5  •  amLODIPine (NORVASC) 5 MG tablet, TAKE 1 TABLET BY MOUTH EVERY DAY, Disp: 30 tablet, Rfl: 0  •  atorvastatin (LIPITOR) 40 MG tablet, Take 40 mg by mouth Every Night., Disp: , Rfl:   •  citalopram (CeleXA) 40 MG tablet, Take 1 tablet by mouth Daily., Disp: , Rfl: 0  •  dexamethasone (DECADRON) 4 MG tablet, Take 1 tablet by mouth Take As Directed. Take 2 tablets in the morning on day 2, then take 2 tablets BID on days 3 and 4 chemo, Disp: 40 tablet, Rfl: 5  •  Fluticasone Furoate-Vilanterol (BREO ELLIPTA) 100-25 MCG/INH aerosol powder , Inhale 1 puff Daily., Disp: 1 each, Rfl: 11  •  levothyroxine (SYNTHROID, LEVOTHROID) 88 MCG tablet, Take 88 mcg by mouth Daily., Disp: , Rfl:   •  LORazepam (ATIVAN) 1 MG tablet, TAKE 1 TABLET BY MOUTH EVERY 8 HOURS AS NEEDED FOR ANXIETY, Disp: 90 tablet, Rfl: 0  •   "Naproxen Sodium (ALEVE PO), Take  by mouth., Disp: , Rfl:   •  nystatin susp + lidocaine viscous (MAGIC MOUTHWASH) oral suspension, 5-10 ml swish and spit or swallow QID prn, Disp: 240 mL, Rfl: 3  •  omeprazole (priLOSEC) 40 MG capsule, Take 1 capsule by mouth Daily., Disp: 30 capsule, Rfl: 5  •  ondansetron (ZOFRAN) 8 MG tablet, Take 1 tablet by mouth Every 8 (Eight) Hours As Needed for nausea or vomiting for up to 60 doses., Disp: 30 tablet, Rfl: 5  •  oxyCODONE (ROXICODONE) 5 MG immediate release tablet, Take 1 tablet by mouth Every 6 (Six) Hours As Needed for Moderate Pain (4-6)., Disp: 30 tablet, Rfl: 0  •  Polyethylene Glycol 3350 (MIRALAX PO), Take  by mouth., Disp: , Rfl:   •  promethazine (PHENERGAN) 25 MG tablet, Take 1 tablet by mouth Every 6 (Six) Hours As Needed for nausea or vomiting for up to 60 doses., Disp: 45 tablet, Rfl: 5  •  saccharomyces boulardii (FLORASTOR) 250 MG capsule, Take 1 capsule by mouth 2 (Two) Times a Day., Disp: 60 capsule, Rfl: 0  •  temazepam (RESTORIL) 15 MG capsule, TK 1 C PO QD HS PRF SLEEP, Disp: , Rfl: 2  •  ZITHROMAX Z-IVONNE 250 MG tablet, Take 2 tablets the first day, then 1 tablet daily for 4 days., Disp: 6 tablet, Rfl: 0  •  zolpidem (AMBIEN) 10 MG tablet, TAKE 1 TABLET BY MOUTH EVERY DAY AT BEDTIME AS NEEDED FOR SLEEP, Disp: 30 tablet, Rfl: 0    PHYSICAL EXAMINATION:   /90 Comment: RUE  Pulse 80  Temp 97.5 °F (36.4 °C) (Temporal Artery )   Resp 16  Ht 66\" (167.6 cm)  Wt 188 lb (85.3 kg)  BMI 30.34 kg/m2   ECOG Performance Status: 1 - Symptomatic but completely ambulatory  General Appearance:  alert, cooperative, no apparent distress and appears stated age   Neurologic/Psychiatric: A&O x 3, gait steady, appropriate affect, strength 5/5 in all muscle groups   HEENT:  Normocephalic, without obvious abnormality, mucous membranes moist   Neck: Supple, symmetrical, trachea midline, no adenopathy;  No thyromegaly, masses, or tenderness   Lungs:   Clear to " auscultation bilaterally; respirations regular, even, and unlabored bilaterally   Heart:  Regular rate and rhythm, no murmurs appreciated   Abdomen:   Soft, non-tender, non-distended and no organomegaly   Lymph nodes: No cervical, supraclavicular, inguinal or axillary adenopathy noted   Extremities: Normal, atraumatic; no clubbing, cyanosis, or edema    Skin: No rashes, ulcers, or suspicious lesions noted     Hospital Outpatient Visit on 07/07/2017   Component Date Value Ref Range Status   • Creatinine 07/07/2017 0.90  0.60 - 1.30 mg/dL Final    Serial Number: [id number]    : [id number]        Ct Chest With Contrast    Result Date: 7/10/2017  Narrative: EXAMINATION: CT CHEST W CONTRAST, CT ABDOMEN PELVIS W CONTRAST 07/07/2017  INDICATION: restaging small cell lung cancer; C34.91-Malignant neoplasm of unspecified part of right bronchus or lung, follow-up lung cancer.  TECHNIQUE: Axial CT imaging was obtained of the chest abdomen and pelvis following the administration of intravenous contrast.  The radiation dose reduction device was turned on for each scan per the ALARA (As Low as Reasonably Achievable) protocol.  COMPARISON: 5/05/2017.  FINDINGS: Thyroid is homogeneous in appearance. There is soft tissue mass again seen within the right upper lobe measuring 2.5 cm on today's examination and previously measuring 2.4 cm. Spiculated nodule is stable within the left lung apex which is unchanged. Several nodules are identified smaller in size anteriorly within the right middle lobe and within the left midlung. Small cavitary nodule identified within the right lower lobe. There is no new focal parenchymal opacification present. Degenerative changes seen within the spine. The lung nodules are all stable. There is no adenopathy identified in the mediastinum. The cardiac chambers are within normal limits. There is no pericardial effusion. No bulky hilar or axillary lymphadenopathy. Visualized abdomen is grossly unremarkable.   ABDOMEN: The liver is homogeneous in appearance. Pancreas is unremarkable. There is a small lipoma identified again at the junction of the second and third portions of duodenum measuring 9 mm. The kidneys and adrenal glands are within normal limits. Pancreas is homogeneous. No free fluid or free air. No abnormal mass or fluid collections identified. Small hilar hernia. No abdominal or retroperitoneal lymphadenopathy. The gallbladder is slightly retracted. Vascular calcification seen of the abdominal aorta and iliac vessels.  PELVIS: The pelvic portion of the gastrointestinal tract is within normal limits. No pelvic adenopathy. No free fluid or free air.  Pelvic organs are unremarkable. The pelvic portion gastrointestinal tract within normal limits. No free fluid or free air. No abnormal mass or fluid collections identified. A reticulosis of the colon without evidence of diverticulitis. The bony structures reveal no evidence of osseous abnormality.      Impression: 1. Stable nodules identified within the lung fields bilaterally. No evidence of progression of disease. 2. No acute intra-abdominal or pelvic abnormality is identified. There are minimal degenerative changes identified within the spine with diverticulosis of colon and no evidence of diverticulitis. No pleural effusion identified.  DICTATED:     07/07/2017 EDITED:         07/07/2017  This report was finalized on 7/10/2017 4:01 PM by Dr. Neida Hernandez MD.      Ct Abdomen Pelvis With Contrast    Result Date: 7/10/2017  Narrative: EXAMINATION: CT CHEST W CONTRAST, CT ABDOMEN PELVIS W CONTRAST 07/07/2017  INDICATION: restaging small cell lung cancer; C34.91-Malignant neoplasm of unspecified part of right bronchus or lung, follow-up lung cancer.  TECHNIQUE: Axial CT imaging was obtained of the chest abdomen and pelvis following the administration of intravenous contrast.  The radiation dose reduction device was turned on for each scan per the ALARA (As Low  as Reasonably Achievable) protocol.  COMPARISON: 5/05/2017.  FINDINGS: Thyroid is homogeneous in appearance. There is soft tissue mass again seen within the right upper lobe measuring 2.5 cm on today's examination and previously measuring 2.4 cm. Spiculated nodule is stable within the left lung apex which is unchanged. Several nodules are identified smaller in size anteriorly within the right middle lobe and within the left midlung. Small cavitary nodule identified within the right lower lobe. There is no new focal parenchymal opacification present. Degenerative changes seen within the spine. The lung nodules are all stable. There is no adenopathy identified in the mediastinum. The cardiac chambers are within normal limits. There is no pericardial effusion. No bulky hilar or axillary lymphadenopathy. Visualized abdomen is grossly unremarkable.  ABDOMEN: The liver is homogeneous in appearance. Pancreas is unremarkable. There is a small lipoma identified again at the junction of the second and third portions of duodenum measuring 9 mm. The kidneys and adrenal glands are within normal limits. Pancreas is homogeneous. No free fluid or free air. No abnormal mass or fluid collections identified. Small hilar hernia. No abdominal or retroperitoneal lymphadenopathy. The gallbladder is slightly retracted. Vascular calcification seen of the abdominal aorta and iliac vessels.  PELVIS: The pelvic portion of the gastrointestinal tract is within normal limits. No pelvic adenopathy. No free fluid or free air.  Pelvic organs are unremarkable. The pelvic portion gastrointestinal tract within normal limits. No free fluid or free air. No abnormal mass or fluid collections identified. A reticulosis of the colon without evidence of diverticulitis. The bony structures reveal no evidence of osseous abnormality.      Impression: 1. Stable nodules identified within the lung fields bilaterally. No evidence of progression of disease. 2. No  acute intra-abdominal or pelvic abnormality is identified. There are minimal degenerative changes identified within the spine with diverticulosis of colon and no evidence of diverticulitis. No pleural effusion identified.  DICTATED:     07/07/2017 EDITED:         07/07/2017  This report was finalized on 7/10/2017 4:01 PM by Dr. Neida Hernandez MD.        ASSESSMENT: The patient is a very pleasant 61 y.o. female with small cell lung cancer.     PROBLEM LIST:  1. Presented with cough, shortness of breath, and pneumonia.   2. Right upper lobe mass with multiple scattered pulmonary nodules bilaterally.   3. Status post bronchoscopy with biopsy revealing small cell lung cancer.   4. PET scan done 2/2/2017 shows disease in the right upper lobe with no evidence of distant metastatic disease. MRI brain negative on 2/2/2017  5.  Started definitive chemotherapy with radiation using cisplatin and etoposide on February 7, 2017.  Status post 6 cycles completed 6/8/2017.  6. COPD  7. History of cervical cancer status post hysterectomy.  8. History of tonsillar cancer status post chemo/radiation.   9. Hypertension.  10. Anxiety and depression.  11. Hypothyroidism.  12. 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  I reviewed the CAT scan results with the patient and her daughter. I reviewed the images myself. I told the patient she has stable disease, with no evidence of new or progressive disease.   2. We will see the patient back in 3 months with repeat CAT scans chest, abdomen, and pelvis prior to return.   3. We will continue tomonitor the patient's blood counts, kidney functions, and liver functions throughout treatment.    4. The patient will continue Zofran to be used as needed for nausea.   5. I discussed the role of prophylactic whole brain radiation with the patient.  I explained to the patient that this is category 1 by NCCN guidelines. She declines at this time.   6. We will continue ack to Ambien 10 mg  at bedtime for insomnia. She did not need a refill on this medication today.    7. Regarding her neuropathy, I told the patient that hopefully this will continue to improve the further she is out from chemotherapy. I offered her a referral to palliative care for symptom management, but she declines at this time.     Scribed for Zay Tan MD by HELEN Paige. 7/11/2017  2:11 PM  Zay Tan MD  7/11/2017   I, Zay Tan MD, personally performed the services described in this documentation as scribed by the above named individual in my presence, and it is both accurate and complete.  7/11/2017  2:11 PM

## 2017-08-14 RX ORDER — OMEPRAZOLE 40 MG/1
CAPSULE, DELAYED RELEASE ORAL
Qty: 30 CAPSULE | Refills: 5 | Status: SHIPPED | OUTPATIENT
Start: 2017-08-14 | End: 2018-01-01 | Stop reason: SDUPTHER

## 2017-10-09 ENCOUNTER — HOSPITAL ENCOUNTER (OUTPATIENT)
Dept: CT IMAGING | Facility: HOSPITAL | Age: 62
Discharge: HOME | End: 2017-10-09
Admitting: NURSE PRACTITIONER

## 2017-10-09 ENCOUNTER — HOSPITAL ENCOUNTER (OUTPATIENT)
Dept: MRI IMAGING | Facility: HOSPITAL | Age: 62
Discharge: HOME | End: 2017-10-09
Attending: INTERNAL MEDICINE

## 2017-10-09 ENCOUNTER — LAB (OUTPATIENT)
Dept: LAB | Facility: HOSPITAL | Age: 62
End: 2017-10-09

## 2017-10-09 DIAGNOSIS — C34.91 SMALL CELL CARCINOMA OF RIGHT LUNG (HCC): ICD-10-CM

## 2017-10-09 LAB
ALBUMIN SERPL-MCNC: 4.3 G/DL (ref 3.2–4.8)
ALBUMIN/GLOB SERPL: 1.9 G/DL (ref 1.5–2.5)
ALP SERPL-CCNC: 107 U/L (ref 25–100)
ALT SERPL W P-5'-P-CCNC: 36 U/L (ref 7–40)
ANION GAP SERPL CALCULATED.3IONS-SCNC: 7 MMOL/L (ref 3–11)
AST SERPL-CCNC: 28 U/L (ref 0–33)
BASOPHILS # BLD AUTO: 0.01 10*3/MM3 (ref 0–0.2)
BASOPHILS NFR BLD AUTO: 0.2 % (ref 0–1)
BILIRUB SERPL-MCNC: 0.4 MG/DL (ref 0.3–1.2)
BUN BLD-MCNC: 15 MG/DL (ref 9–23)
BUN/CREAT SERPL: 18.8 (ref 7–25)
CALCIUM SPEC-SCNC: 10.1 MG/DL (ref 8.7–10.4)
CHLORIDE SERPL-SCNC: 105 MMOL/L (ref 99–109)
CO2 SERPL-SCNC: 29 MMOL/L (ref 20–31)
CREAT BLD-MCNC: 0.8 MG/DL (ref 0.6–1.3)
DEPRECATED RDW RBC AUTO: 46.3 FL (ref 37–54)
EOSINOPHIL # BLD AUTO: 0.07 10*3/MM3 (ref 0–0.3)
EOSINOPHIL NFR BLD AUTO: 1.3 % (ref 0–3)
ERYTHROCYTE [DISTWIDTH] IN BLOOD BY AUTOMATED COUNT: 13.9 % (ref 11.3–14.5)
GFR SERPL CREATININE-BSD FRML MDRD: 72 ML/MIN/1.73
GLOBULIN UR ELPH-MCNC: 2.3 GM/DL
GLUCOSE BLD-MCNC: 88 MG/DL (ref 70–100)
HCT VFR BLD AUTO: 41.8 % (ref 34.5–44)
HGB BLD-MCNC: 13.8 G/DL (ref 11.5–15.5)
IMM GRANULOCYTES # BLD: 0.03 10*3/MM3 (ref 0–0.03)
IMM GRANULOCYTES NFR BLD: 0.5 % (ref 0–0.6)
LYMPHOCYTES # BLD AUTO: 0.99 10*3/MM3 (ref 0.6–4.8)
LYMPHOCYTES NFR BLD AUTO: 18 % (ref 24–44)
MCH RBC QN AUTO: 30 PG (ref 27–31)
MCHC RBC AUTO-ENTMCNC: 33 G/DL (ref 32–36)
MCV RBC AUTO: 90.9 FL (ref 80–99)
MONOCYTES # BLD AUTO: 0.81 10*3/MM3 (ref 0–1)
MONOCYTES NFR BLD AUTO: 14.7 % (ref 0–12)
NEUTROPHILS # BLD AUTO: 3.6 10*3/MM3 (ref 1.5–8.3)
NEUTROPHILS NFR BLD AUTO: 65.3 % (ref 41–71)
PLATELET # BLD AUTO: 218 10*3/MM3 (ref 150–450)
PMV BLD AUTO: 10.5 FL (ref 6–12)
POTASSIUM BLD-SCNC: 4.4 MMOL/L (ref 3.5–5.5)
PROT SERPL-MCNC: 6.6 G/DL (ref 5.7–8.2)
RBC # BLD AUTO: 4.6 10*6/MM3 (ref 3.89–5.14)
SODIUM BLD-SCNC: 141 MMOL/L (ref 132–146)
WBC NRBC COR # BLD: 5.51 10*3/MM3 (ref 3.5–10.8)

## 2017-10-09 PROCEDURE — A9577 INJ MULTIHANCE: HCPCS | Performed by: INTERNAL MEDICINE

## 2017-10-09 PROCEDURE — 74177 CT ABD & PELVIS W/CONTRAST: CPT

## 2017-10-09 PROCEDURE — 80053 COMPREHEN METABOLIC PANEL: CPT | Performed by: NURSE PRACTITIONER

## 2017-10-09 PROCEDURE — 0 GADOBENATE DIMEGLUMINE 529 MG/ML SOLUTION: Performed by: INTERNAL MEDICINE

## 2017-10-09 PROCEDURE — 82565 ASSAY OF CREATININE: CPT

## 2017-10-09 PROCEDURE — 36415 COLL VENOUS BLD VENIPUNCTURE: CPT

## 2017-10-09 PROCEDURE — 71260 CT THORAX DX C+: CPT

## 2017-10-09 PROCEDURE — 0 IOPAMIDOL 61 % SOLUTION: Performed by: NURSE PRACTITIONER

## 2017-10-09 PROCEDURE — 85025 COMPLETE CBC W/AUTO DIFF WBC: CPT | Performed by: NURSE PRACTITIONER

## 2017-10-09 PROCEDURE — 70553 MRI BRAIN STEM W/O & W/DYE: CPT

## 2017-10-09 RX ADMIN — GADOBENATE DIMEGLUMINE 15 ML: 529 INJECTION, SOLUTION INTRAVENOUS at 11:15

## 2017-10-09 RX ADMIN — IOPAMIDOL 80 ML: 612 INJECTION, SOLUTION INTRAVENOUS at 10:04

## 2017-10-10 ENCOUNTER — OFFICE VISIT (OUTPATIENT)
Dept: ONCOLOGY | Facility: CLINIC | Age: 62
End: 2017-10-10

## 2017-10-10 VITALS
HEART RATE: 84 BPM | SYSTOLIC BLOOD PRESSURE: 161 MMHG | WEIGHT: 188 LBS | HEIGHT: 66 IN | RESPIRATION RATE: 16 BRPM | TEMPERATURE: 97.4 F | BODY MASS INDEX: 30.22 KG/M2 | DIASTOLIC BLOOD PRESSURE: 104 MMHG

## 2017-10-10 DIAGNOSIS — C34.91 SMALL CELL CARCINOMA OF RIGHT LUNG (HCC): Primary | ICD-10-CM

## 2017-10-10 PROCEDURE — 99214 OFFICE O/P EST MOD 30 MIN: CPT | Performed by: NURSE PRACTITIONER

## 2017-10-10 RX ORDER — PREDNISONE 10 MG/1
TABLET ORAL
Refills: 0 | COMMUNITY
Start: 2017-09-05 | End: 2018-01-01

## 2017-10-10 RX ORDER — GABAPENTIN 300 MG/1
300 CAPSULE ORAL 3 TIMES DAILY
Qty: 90 CAPSULE | Refills: 2 | OUTPATIENT
Start: 2017-10-10 | End: 2018-01-01 | Stop reason: SDUPTHER

## 2017-10-10 RX ORDER — GABAPENTIN 300 MG/1
CAPSULE ORAL
Refills: 0 | COMMUNITY
Start: 2017-09-05 | End: 2017-10-10 | Stop reason: SDUPTHER

## 2017-10-10 RX ORDER — DOXYCYCLINE HYCLATE 100 MG
TABLET ORAL
Refills: 0 | COMMUNITY
Start: 2017-09-05 | End: 2017-01-01

## 2017-10-10 NOTE — PROGRESS NOTES
DATE OF VISIT: 10/10/2017    REASON FOR VISIT: Limited stage small cell lung cancer T2N0M0.       HISTORY OF PRESENT ILLNESS: The patient is a very pleasant 61 y.o. female  with past medical history significant for small cell lung cancer diagnosed 12/30/2016. The patient presented with pneumonia unresponsive to antibiotics. Chest x-ray was completed that revealed a right upper lobe lung mass, confirmed on CT angiogram with multiple pulmonary nodules and left lower lobe infiltrate. He underwent bronchoscopy with biopsies on 12/30/2016 with pathology revealing small kevin lung cancer. The patient had staging work up including PET scan and MRI brain that failed to show evidence of distant metastatic disease. The patient was started on definitive treatment with cisplatin/etoposide with radiation on 2/8/2017. The pateint completed cycle # 4 on 04/21/2017. The patient elected to proceed with two additional cycles of cisplatin/etoposide, and completed cycle #6 on 6/8/2017. She is here today for scheduled follow up visit.       SUBJECTIVE: The patient has been doing fairly well. She still complains of neuropathy type discomfort in her hands and feet. She was started on gabapentin 300 mg at bedtime by her primary care physician, but she thinks she needs a higher dose. She has no new cough or shortness of breath. She has been traveling some, and her energy level has improved. She denies visual changes or dizziness. She does have some persistent headaches that she thinks are related to sinus drainage. She has had no fevers or chills. She has gained some weight, and is eating and drinking well.     PAST MEDICAL HISTORY/SOCIAL HISTORY/FAMILY HISTORY: Unchanged from my prior documentation done on 01/27/2017.    Review of Systems   Constitutional: Positive for fatigue. Negative for activity change, appetite change, chills, fever and unexpected weight change.   HENT: Negative for hearing loss, mouth sores, nosebleeds, sore throat and  trouble swallowing.    Eyes: Negative for visual disturbance.   Respiratory: Positive for cough. Negative for chest tightness, shortness of breath and wheezing.    Cardiovascular: Negative for chest pain and palpitations.   Gastrointestinal: Negative for abdominal distention, abdominal pain, blood in stool, constipation, diarrhea, rectal pain and vomiting.   Endocrine: Negative for cold intolerance and heat intolerance.   Genitourinary: Negative for difficulty urinating, dysuria, frequency and urgency.   Musculoskeletal: Positive for arthralgias. Negative for back pain, gait problem, joint swelling and myalgias.   Skin: Negative for rash.   Neurological: Positive for numbness. Negative for dizziness, tremors, syncope, weakness, light-headedness and headaches.   Hematological: Negative for adenopathy. Does not bruise/bleed easily.   Psychiatric/Behavioral: Positive for sleep disturbance. Negative for confusion and suicidal ideas. The patient is not nervous/anxious.          Current Outpatient Prescriptions:   •  acetaminophen (TYLENOL) 500 MG tablet, Take 650 mg by mouth 3 (Three) Times a Day As Needed for mild pain (1-3)., Disp: , Rfl:   •  amLODIPine (NORVASC) 10 MG tablet, TK 1 T PO D, Disp: , Rfl: 5  •  amLODIPine (NORVASC) 5 MG tablet, TAKE 1 TABLET BY MOUTH EVERY DAY, Disp: 30 tablet, Rfl: 0  •  atorvastatin (LIPITOR) 40 MG tablet, Take 40 mg by mouth Every Night., Disp: , Rfl:   •  citalopram (CeleXA) 40 MG tablet, Take 1 tablet by mouth Daily., Disp: , Rfl: 0  •  dexamethasone (DECADRON) 4 MG tablet, Take 1 tablet by mouth Take As Directed. Take 2 tablets in the morning on day 2, then take 2 tablets BID on days 3 and 4 chemo, Disp: 40 tablet, Rfl: 5  •  Fluticasone Furoate-Vilanterol (BREO ELLIPTA) 100-25 MCG/INH aerosol powder , Inhale 1 puff Daily., Disp: 1 each, Rfl: 11  •  levothyroxine (SYNTHROID, LEVOTHROID) 88 MCG tablet, Take 88 mcg by mouth Daily., Disp: , Rfl:   •  LORazepam (ATIVAN) 1 MG tablet,  TAKE 1 TABLET BY MOUTH EVERY 8 HOURS AS NEEDED FOR ANXIETY, Disp: 90 tablet, Rfl: 0  •  Naproxen Sodium (ALEVE PO), Take  by mouth., Disp: , Rfl:   •  nystatin susp + lidocaine viscous (MAGIC MOUTHWASH) oral suspension, 5-10 ml swish and spit or swallow QID prn, Disp: 240 mL, Rfl: 3  •  omeprazole (priLOSEC) 40 MG capsule, TAKE 1 CAPSULE BY MOUTH DAILY, Disp: 30 capsule, Rfl: 5  •  ondansetron (ZOFRAN) 8 MG tablet, Take 1 tablet by mouth Every 8 (Eight) Hours As Needed for nausea or vomiting for up to 60 doses., Disp: 30 tablet, Rfl: 5  •  oxyCODONE (ROXICODONE) 5 MG immediate release tablet, Take 1 tablet by mouth Every 6 (Six) Hours As Needed for Moderate Pain (4-6)., Disp: 30 tablet, Rfl: 0  •  Polyethylene Glycol 3350 (MIRALAX PO), Take  by mouth., Disp: , Rfl:   •  promethazine (PHENERGAN) 25 MG tablet, Take 1 tablet by mouth Every 6 (Six) Hours As Needed for nausea or vomiting for up to 60 doses., Disp: 45 tablet, Rfl: 5  •  saccharomyces boulardii (FLORASTOR) 250 MG capsule, Take 1 capsule by mouth 2 (Two) Times a Day., Disp: 60 capsule, Rfl: 0  •  temazepam (RESTORIL) 15 MG capsule, TK 1 C PO QD HS PRF SLEEP, Disp: , Rfl: 2  •  ZITHROMAX Z-IVONNE 250 MG tablet, Take 2 tablets the first day, then 1 tablet daily for 4 days., Disp: 6 tablet, Rfl: 0  •  zolpidem (AMBIEN) 10 MG tablet, TAKE 1 TABLET BY MOUTH EVERY DAY AT BEDTIME AS NEEDED FOR SLEEP, Disp: 30 tablet, Rfl: 0    PHYSICAL EXAMINATION:   There were no vitals taken for this visit.   ECOG Performance Status: 1 - Symptomatic but completely ambulatory  General Appearance:  alert, cooperative, no apparent distress and appears stated age   Neurologic/Psychiatric: A&O x 3, gait steady, appropriate affect, strength 5/5 in all muscle groups   HEENT:  Normocephalic, without obvious abnormality, mucous membranes moist   Neck: Supple, symmetrical, trachea midline, no adenopathy;  No thyromegaly, masses, or tenderness   Lungs:   Clear to auscultation bilaterally;  respirations regular, even, and unlabored bilaterally   Heart:  Regular rate and rhythm, no murmurs appreciated   Abdomen:   Soft, non-tender, non-distended and no organomegaly   Lymph nodes: No cervical, supraclavicular, inguinal or axillary adenopathy noted   Extremities: Normal, atraumatic; no clubbing, cyanosis, or edema    Skin: No rashes, ulcers, or suspicious lesions noted     Lab on 10/09/2017   Component Date Value Ref Range Status   • Glucose 10/09/2017 88  70 - 100 mg/dL Final   • BUN 10/09/2017 15  9 - 23 mg/dL Final   • Creatinine 10/09/2017 0.80  0.60 - 1.30 mg/dL Final   • Sodium 10/09/2017 141  132 - 146 mmol/L Final   • Potassium 10/09/2017 4.4  3.5 - 5.5 mmol/L Final   • Chloride 10/09/2017 105  99 - 109 mmol/L Final   • CO2 10/09/2017 29.0  20.0 - 31.0 mmol/L Final   • Calcium 10/09/2017 10.1  8.7 - 10.4 mg/dL Final   • Total Protein 10/09/2017 6.6  5.7 - 8.2 g/dL Final   • Albumin 10/09/2017 4.30  3.20 - 4.80 g/dL Final   • ALT (SGPT) 10/09/2017 36  7 - 40 U/L Final   • AST (SGOT) 10/09/2017 28  0 - 33 U/L Final   • Alkaline Phosphatase 10/09/2017 107* 25 - 100 U/L Final   • Total Bilirubin 10/09/2017 0.4  0.3 - 1.2 mg/dL Final   • eGFR Non  Amer 10/09/2017 72  >60 mL/min/1.73 Final   • Globulin 10/09/2017 2.3  gm/dL Final   • A/G Ratio 10/09/2017 1.9  1.5 - 2.5 g/dL Final   • BUN/Creatinine Ratio 10/09/2017 18.8  7.0 - 25.0 Final   • Anion Gap 10/09/2017 7.0  3.0 - 11.0 mmol/L Final   • WBC 10/09/2017 5.51  3.50 - 10.80 10*3/mm3 Final   • RBC 10/09/2017 4.60  3.89 - 5.14 10*6/mm3 Final   • Hemoglobin 10/09/2017 13.8  11.5 - 15.5 g/dL Final   • Hematocrit 10/09/2017 41.8  34.5 - 44.0 % Final   • MCV 10/09/2017 90.9  80.0 - 99.0 fL Final   • MCH 10/09/2017 30.0  27.0 - 31.0 pg Final   • MCHC 10/09/2017 33.0  32.0 - 36.0 g/dL Final   • RDW 10/09/2017 13.9  11.3 - 14.5 % Final   • RDW-SD 10/09/2017 46.3  37.0 - 54.0 fl Final   • MPV 10/09/2017 10.5  6.0 - 12.0 fL Final   • Platelets  10/09/2017 218  150 - 450 10*3/mm3 Final   • Neutrophil % 10/09/2017 65.3  41.0 - 71.0 % Final   • Lymphocyte % 10/09/2017 18.0* 24.0 - 44.0 % Final   • Monocyte % 10/09/2017 14.7* 0.0 - 12.0 % Final   • Eosinophil % 10/09/2017 1.3  0.0 - 3.0 % Final   • Basophil % 10/09/2017 0.2  0.0 - 1.0 % Final   • Immature Grans % 10/09/2017 0.5  0.0 - 0.6 % Final   • Neutrophils, Absolute 10/09/2017 3.60  1.50 - 8.30 10*3/mm3 Final   • Lymphocytes, Absolute 10/09/2017 0.99  0.60 - 4.80 10*3/mm3 Final   • Monocytes, Absolute 10/09/2017 0.81  0.00 - 1.00 10*3/mm3 Final   • Eosinophils, Absolute 10/09/2017 0.07  0.00 - 0.30 10*3/mm3 Final   • Basophils, Absolute 10/09/2017 0.01  0.00 - 0.20 10*3/mm3 Final   • Immature Grans, Absolute 10/09/2017 0.03  0.00 - 0.03 10*3/mm3 Final        Ct Chest With Contrast    Result Date: 10/9/2017  Narrative: EXAMINATION: CT CHEST W CONTRAST-, CT ABDOMEN AND PELVIS W CONTRAST-  INDICATION: Restaging small cell lung cancer; C34.91-Malignant neoplasm of unspecified part of right bronchus or lung; followup lung cancer.  TECHNIQUE: Multiple axial CT imaging was obtained of the chest, abdomen and pelvis following the administration of intravenous contrast.  The radiation dose reduction device was turned on for each scan per the ALARA (As Low as Reasonably Achievable) protocol.  COMPARISON: 07/07/2017.  FINDINGS: CHEST: Thyroid is heterogeneous in appearance. No mediastinal mass or bulky adenopathy. Vascular calcification is seen within the thoracic aorta. The lung parenchyma reveals several nodular densities identified within the upper lung fields bilaterally. The nodular densities appear to have slightly improved and decreased in size in the interval for example the soft tissue nodule within the right upper lobe on lung windows today measures approximately 2 cm and previously measured approximately 2.4 cm. The groundglass nodule identified within the left upper lobe is stable in appearance and size  in the interval. The soft tissue nodule identified more inferiorly within the left upper lobe today measures 1.2 cm and previously measured 1.0 cm. Several noncalcified nodules are seen lower within the lung fields one in the right middle lobe which is stable. Anteriorly within the right upper lobe, there is a nodule adjacent to the mediastinum today measuring 1.3 cm not significantly changed in size or appearance in the interval. Vascular calcification is seen within the thoracic aorta. No mediastinal mass or adenopathy. Small hiatal hernia.  The liver is homogeneous. Spleen is unremarkable. Kidneys and adrenal glands are within normal limits. Pancreas is homogeneous. The abdominal portion of the gastrointestinal tract is within normal limits. Stool scattered throughout the colon with diverticulosis of the colon and no evidence of diverticulitis. There is a fat-containing lesion identified within the second portion of the duodenum suggesting possibly a small lipoma. This is stable when compared to the prior studies.  PELVIS: Small umbilical hernia containing mesenteric fat only. Diverticulosis of the colon without evidence of diverticulitis. No free fluid or free air. No abnormal mass or fluid collection is identified. Bony structures are grossly unremarkable.      Impression: No significant change seen in the appearance of the nodules within the lung fields in the interval. There is no evidence of progression of disease. The abdomen and pelvis is also stable and grossly  D:  10/09/2017 E:  10/09/2017  This report was finalized on 10/9/2017 10:51 AM by Dr. Neida Hernandez MD.      Mri Brain With & Without Contrast    Result Date: 10/9/2017  Narrative: EXAMINATION: MRI BRAIN WITH AND WITHOUT CONTRAST-10/09/2017:  INDICATION: F/U scan; C34.91-Malignant neoplasm of unspecified part of right bronchus or lung , followup and staging lung malignancy.  TECHNIQUE: MR data sets of the brain were performed without and with  intravenous contrast.  COMPARISON: Compared to previous MR data sets of the brain 02/02/2017.  FINDINGS: 1. The cervicomedullary junction and foramen magnum are clear without crowding or impingement. The midline structures, pituitary gland and optic chiasm are within normal limits.  2. The flow voids are within normal limits. There is no central or peripheral atrophy. There is mild motion misregistration. The conal contents are unremarkable.  3. There is advanced white matter microangiopathy in the periventricular white matter and in the corona radiata. There are numerous white matter lesions in this area indicative of small vessel ischemic insults bilaterally and diffusely. This pattern is impressive but stable from previous examinations without progression.  4. T1 data sets are negative for hemorrhage or extracerebral collection. There is no midline shift, mass effect or edema.  The diffusion-weighted sequence is negative. There is no evidence of acute restricted diffusion or acute ischemic insult in evolution. The ADC mapping exam is within normal limits.  5. Contrast enhanced data sets are negative for intra-axial enhancing lesion. Primary tumor or active enhancing metastatic disease is not currently identified.      Impression: 1.  Accelerated white matter microangiopathy, stable from 02/02/2017 data sets. 2.  Enhancing lesion or acute active metastatic disease is not currently identified within the brain. 3.  No interval change has occurred since previous studies of 02/02/2017.   D:  10/09/2017 E:  10/09/2017  This report was finalized on 10/9/2017 3:05 PM by Dr. Antoine Benites MD.      Ct Abdomen Pelvis With Contrast    Result Date: 10/9/2017  Narrative: EXAMINATION: CT CHEST W CONTRAST-, CT ABDOMEN AND PELVIS W CONTRAST-  INDICATION: Restaging small cell lung cancer; C34.91-Malignant neoplasm of unspecified part of right bronchus or lung; followup lung cancer.  TECHNIQUE: Multiple axial CT imaging was  obtained of the chest, abdomen and pelvis following the administration of intravenous contrast.  The radiation dose reduction device was turned on for each scan per the ALARA (As Low as Reasonably Achievable) protocol.  COMPARISON: 07/07/2017.  FINDINGS: CHEST: Thyroid is heterogeneous in appearance. No mediastinal mass or bulky adenopathy. Vascular calcification is seen within the thoracic aorta. The lung parenchyma reveals several nodular densities identified within the upper lung fields bilaterally. The nodular densities appear to have slightly improved and decreased in size in the interval for example the soft tissue nodule within the right upper lobe on lung windows today measures approximately 2 cm and previously measured approximately 2.4 cm. The groundglass nodule identified within the left upper lobe is stable in appearance and size in the interval. The soft tissue nodule identified more inferiorly within the left upper lobe today measures 1.2 cm and previously measured 1.0 cm. Several noncalcified nodules are seen lower within the lung fields one in the right middle lobe which is stable. Anteriorly within the right upper lobe, there is a nodule adjacent to the mediastinum today measuring 1.3 cm not significantly changed in size or appearance in the interval. Vascular calcification is seen within the thoracic aorta. No mediastinal mass or adenopathy. Small hiatal hernia.  The liver is homogeneous. Spleen is unremarkable. Kidneys and adrenal glands are within normal limits. Pancreas is homogeneous. The abdominal portion of the gastrointestinal tract is within normal limits. Stool scattered throughout the colon with diverticulosis of the colon and no evidence of diverticulitis. There is a fat-containing lesion identified within the second portion of the duodenum suggesting possibly a small lipoma. This is stable when compared to the prior studies.  PELVIS: Small umbilical hernia containing mesenteric fat  only. Diverticulosis of the colon without evidence of diverticulitis. No free fluid or free air. No abnormal mass or fluid collection is identified. Bony structures are grossly unremarkable.      Impression: No significant change seen in the appearance of the nodules within the lung fields in the interval. There is no evidence of progression of disease. The abdomen and pelvis is also stable and grossly  D:  10/09/2017 E:  10/09/2017  This report was finalized on 10/9/2017 10:51 AM by Dr. Neida Hernandez MD.        ASSESSMENT: The patient is a very pleasant 61 y.o. female with small cell lung cancer.     PROBLEM LIST:  1. Presented with cough, shortness of breath, and pneumonia.   2. Right upper lobe mass with multiple scattered pulmonary nodules bilaterally.   3. Status post bronchoscopy with biopsy revealing small cell lung cancer.   4. PET scan done 2/2/2017 shows disease in the right upper lobe with no evidence of distant metastatic disease. MRI brain negative on 2/2/2017  5.  Started definitive chemotherapy with radiation using cisplatin and etoposide on February 7, 2017.  Status post 6 cycles completed 6/8/2017.  6. COPD  7. History of cervical cancer status post hysterectomy.  8. History of tonsillar cancer status post chemo/radiation.   9. Hypertension.  10. Anxiety and depression.  11. Hypothyroidism.  12. 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  I reviewed the CAT scan and MRI results with the patient and her daughter. I reviewed the images myself. I told the patient she has stable disease, with no evidence of new or progressive disease.   2. We will see the patient back in 3 months with repeat CAT scans chest, abdomen, and pelvis prior to return. We will do repeat MRI in the future if she has new symptoms.   3. I reviewed the blood work results with the patient. Her blood counts are stable. We will repeat labs on return including CBC and CMP.   4. The patient will continue Zofran to be  used as needed for nausea.   5. The patient declined prophylactic whole brain radiation.    6. We will continue Ambien 10 mg at bedtime for insomnia.   7. Regarding her neuropathy, we will increase her gabapentin dose to 300 mg twice a day. She was given a new prescription for this today.   8. The patient will follow up with Dr. Mora for primary care with management of hypothyroidism and hypertension.        Kae Covarrubias, APRN  10/10/2017

## 2017-10-13 LAB — CREAT BLDA-MCNC: 0.9 MG/DL (ref 0.6–1.3)

## 2017-11-16 NOTE — PROGRESS NOTES
FOLLOW UP NOTE    PATIENT:                                                      Leonarda Benitez  MEDICAL RECORD #:                        [id number]  :                                                          1955  COMPLETION DATE:    17  DIAGNOSIS:     Small cell carcinoma of right lung  Staging form: Lung, AJCC V7, Clinical: Stage IB (T2a, N0, M0)      BRIEF HISTORY:    Mrs. Benitez's very pleasant 62-year-old female who completed combined chemoradiotherapy for small cell carcinoma lung.  She received 60 Gray in 30 fractions completing 2017.  She continues to do well but has neuropathy for which she is taking gabapentin.  She is been traveling with friends and enjoying herself.      MEDICATIONS: Medication reconciliation for the patient was reviewed and confirmed in the electronic medical record.    Review of Systems   Constitutional: Negative.    HENT:   Positive for tinnitus.    Eyes: Negative.    Respiratory: Negative.    Cardiovascular: Negative.    Gastrointestinal: Negative.    Endocrine: Negative.    Genitourinary: Negative.     Musculoskeletal: Negative.    Skin: Negative.    Neurological: Positive for numbness.   Hematological: Bruises/bleeds easily.   Psychiatric/Behavioral: Negative.               KPS 90%    Physical Exam   Constitutional: She appears well-developed and well-nourished.   Neck: Neck supple.   Cardiovascular: Normal rate, regular rhythm and normal heart sounds.    Pulmonary/Chest: Effort normal and breath sounds normal.   Neurological: No cranial nerve deficit.   Nursing note and vitals reviewed.      VITAL SIGNS:   Vitals:    17 1156   BP: 131/82   Pulse: 75   Temp: 97.8 °F (36.6 °C)   SpO2: 95%  Comment: RA   Weight: 192 lb (87.1 kg)   PainSc: 6  Comment: neuropathy   PainLoc: Foot  Comment: feet       The following portions of the patient's history were reviewed and updated as appropriate: allergies, current medications, past family history, past medical  history, past social history, past surgical history and problem list.            IMPRESSION:  Mrs. Benitez is doing great.  She is enjoying traveling.      RECOMMENDATIONS:  We will see Mrs. Benitez back as needed.  It's been our pleasure participating in her care.           Lesa Cardenas MD    Errors in dictation may reflect use of voice recognition software and not all errors in transcription may have been detected prior to signing.

## 2018-01-01 ENCOUNTER — HOSPITAL ENCOUNTER (OUTPATIENT)
Dept: RADIATION ONCOLOGY | Facility: HOSPITAL | Age: 63
Setting detail: RADIATION/ONCOLOGY SERIES
Discharge: HOME | End: 2018-03-05

## 2018-01-01 ENCOUNTER — APPOINTMENT (OUTPATIENT)
Dept: GENERAL RADIOLOGY | Facility: HOSPITAL | Age: 63
End: 2018-01-01

## 2018-01-01 ENCOUNTER — LAB (OUTPATIENT)
Dept: LAB | Facility: HOSPITAL | Age: 63
End: 2018-01-01

## 2018-01-01 ENCOUNTER — OFFICE VISIT (OUTPATIENT)
Dept: ONCOLOGY | Facility: CLINIC | Age: 63
End: 2018-01-01

## 2018-01-01 ENCOUNTER — TELEPHONE (OUTPATIENT)
Dept: ONCOLOGY | Facility: CLINIC | Age: 63
End: 2018-01-01

## 2018-01-01 ENCOUNTER — INFUSION (OUTPATIENT)
Dept: ONCOLOGY | Facility: HOSPITAL | Age: 63
End: 2018-01-01

## 2018-01-01 ENCOUNTER — HOSPITAL ENCOUNTER (OUTPATIENT)
Dept: CT IMAGING | Facility: HOSPITAL | Age: 63
Discharge: HOME | End: 2018-07-31
Attending: INTERNAL MEDICINE | Admitting: INTERNAL MEDICINE

## 2018-01-01 ENCOUNTER — EDUCATION (OUTPATIENT)
Dept: ONCOLOGY | Facility: HOSPITAL | Age: 63
End: 2018-01-01

## 2018-01-01 ENCOUNTER — HOSPITAL ENCOUNTER (OUTPATIENT)
Dept: PET IMAGING | Facility: HOSPITAL | Age: 63
End: 2018-01-01
Attending: INTERNAL MEDICINE

## 2018-01-01 ENCOUNTER — OFFICE VISIT (OUTPATIENT)
Dept: RADIATION ONCOLOGY | Facility: HOSPITAL | Age: 63
End: 2018-01-01

## 2018-01-01 ENCOUNTER — HOSPITAL ENCOUNTER (OUTPATIENT)
Dept: RADIATION ONCOLOGY | Facility: HOSPITAL | Age: 63
Discharge: HOME | End: 2018-03-20

## 2018-01-01 ENCOUNTER — HOSPITAL ENCOUNTER (OUTPATIENT)
Dept: CT IMAGING | Facility: HOSPITAL | Age: 63
Discharge: HOME | End: 2018-04-23
Attending: INTERNAL MEDICINE | Admitting: INTERNAL MEDICINE

## 2018-01-01 ENCOUNTER — APPOINTMENT (OUTPATIENT)
Dept: PET IMAGING | Facility: HOSPITAL | Age: 63
End: 2018-01-01
Attending: INTERNAL MEDICINE

## 2018-01-01 ENCOUNTER — HOSPITAL ENCOUNTER (OUTPATIENT)
Dept: RADIATION ONCOLOGY | Facility: HOSPITAL | Age: 63
Setting detail: RADIATION/ONCOLOGY SERIES
Discharge: HOME | End: 2018-01-18

## 2018-01-01 ENCOUNTER — TELEPHONE (OUTPATIENT)
Dept: RADIATION ONCOLOGY | Facility: HOSPITAL | Age: 63
End: 2018-01-01

## 2018-01-01 ENCOUNTER — APPOINTMENT (OUTPATIENT)
Dept: PREADMISSION TESTING | Facility: HOSPITAL | Age: 63
End: 2018-01-01

## 2018-01-01 ENCOUNTER — HOSPITAL ENCOUNTER (OUTPATIENT)
Facility: HOSPITAL | Age: 63
Setting detail: HOSPITAL OUTPATIENT SURGERY
Discharge: HOME | End: 2018-04-26
Attending: THORACIC SURGERY (CARDIOTHORACIC VASCULAR SURGERY) | Admitting: THORACIC SURGERY (CARDIOTHORACIC VASCULAR SURGERY)

## 2018-01-01 ENCOUNTER — HOSPITAL ENCOUNTER (OUTPATIENT)
Dept: CT IMAGING | Facility: HOSPITAL | Age: 63
Discharge: HOME | End: 2018-01-08
Admitting: NURSE PRACTITIONER

## 2018-01-01 ENCOUNTER — HOSPITAL ENCOUNTER (OUTPATIENT)
Dept: RADIATION ONCOLOGY | Facility: HOSPITAL | Age: 63
Discharge: HOME | End: 2018-03-21

## 2018-01-01 ENCOUNTER — HOSPITAL ENCOUNTER (OUTPATIENT)
Dept: RADIATION ONCOLOGY | Facility: HOSPITAL | Age: 63
Discharge: HOME | End: 2018-03-22

## 2018-01-01 ENCOUNTER — HOSPITAL ENCOUNTER (OUTPATIENT)
Dept: RADIATION ONCOLOGY | Facility: HOSPITAL | Age: 63
Discharge: HOME | End: 2018-03-19

## 2018-01-01 ENCOUNTER — ANESTHESIA EVENT (OUTPATIENT)
Dept: PERIOP | Facility: HOSPITAL | Age: 63
End: 2018-01-01

## 2018-01-01 ENCOUNTER — HOSPITAL ENCOUNTER (OUTPATIENT)
Dept: RADIATION ONCOLOGY | Facility: HOSPITAL | Age: 63
Discharge: HOME | End: 2018-03-05

## 2018-01-01 ENCOUNTER — HOSPITAL ENCOUNTER (OUTPATIENT)
Dept: RADIATION ONCOLOGY | Facility: HOSPITAL | Age: 63
Setting detail: RADIATION/ONCOLOGY SERIES
Discharge: HOME | End: 2018-04-25

## 2018-01-01 ENCOUNTER — APPOINTMENT (OUTPATIENT)
Dept: LAB | Facility: HOSPITAL | Age: 63
End: 2018-01-01

## 2018-01-01 ENCOUNTER — DOCUMENTATION (OUTPATIENT)
Dept: RADIATION ONCOLOGY | Facility: HOSPITAL | Age: 63
End: 2018-01-01

## 2018-01-01 ENCOUNTER — OFFICE VISIT (OUTPATIENT)
Dept: PULMONOLOGY | Facility: CLINIC | Age: 63
End: 2018-01-01

## 2018-01-01 ENCOUNTER — PREP FOR SURGERY (OUTPATIENT)
Dept: OTHER | Facility: HOSPITAL | Age: 63
End: 2018-01-01

## 2018-01-01 ENCOUNTER — DOCUMENTATION (OUTPATIENT)
Dept: NUTRITION | Facility: HOSPITAL | Age: 63
End: 2018-01-01

## 2018-01-01 ENCOUNTER — ANESTHESIA (OUTPATIENT)
Dept: PERIOP | Facility: HOSPITAL | Age: 63
End: 2018-01-01

## 2018-01-01 ENCOUNTER — HOSPITAL ENCOUNTER (OUTPATIENT)
Dept: MRI IMAGING | Facility: HOSPITAL | Age: 63
Discharge: HOME | End: 2018-02-22
Attending: RADIOLOGY | Admitting: RADIOLOGY

## 2018-01-01 VITALS
BODY MASS INDEX: 32.55 KG/M2 | SYSTOLIC BLOOD PRESSURE: 142 MMHG | HEART RATE: 84 BPM | HEIGHT: 65 IN | DIASTOLIC BLOOD PRESSURE: 88 MMHG | RESPIRATION RATE: 18 BRPM | WEIGHT: 195.4 LBS | OXYGEN SATURATION: 95 % | TEMPERATURE: 97.8 F

## 2018-01-01 VITALS
SYSTOLIC BLOOD PRESSURE: 138 MMHG | RESPIRATION RATE: 20 BRPM | TEMPERATURE: 98.9 F | HEIGHT: 65 IN | OXYGEN SATURATION: 93 % | WEIGHT: 199.1 LBS | HEART RATE: 79 BPM | DIASTOLIC BLOOD PRESSURE: 85 MMHG | BODY MASS INDEX: 33.17 KG/M2

## 2018-01-01 VITALS
RESPIRATION RATE: 16 BRPM | DIASTOLIC BLOOD PRESSURE: 78 MMHG | WEIGHT: 200 LBS | BODY MASS INDEX: 33.32 KG/M2 | TEMPERATURE: 98.8 F | SYSTOLIC BLOOD PRESSURE: 126 MMHG | HEART RATE: 89 BPM | HEIGHT: 65 IN

## 2018-01-01 VITALS
WEIGHT: 199.1 LBS | RESPIRATION RATE: 18 BRPM | OXYGEN SATURATION: 92 % | SYSTOLIC BLOOD PRESSURE: 143 MMHG | TEMPERATURE: 97.7 F | DIASTOLIC BLOOD PRESSURE: 86 MMHG | BODY MASS INDEX: 32.15 KG/M2 | HEART RATE: 92 BPM

## 2018-01-01 VITALS
RESPIRATION RATE: 19 BRPM | HEART RATE: 96 BPM | WEIGHT: 195 LBS | TEMPERATURE: 97.3 F | BODY MASS INDEX: 31.47 KG/M2 | SYSTOLIC BLOOD PRESSURE: 124 MMHG | DIASTOLIC BLOOD PRESSURE: 86 MMHG

## 2018-01-01 VITALS
SYSTOLIC BLOOD PRESSURE: 127 MMHG | HEART RATE: 82 BPM | BODY MASS INDEX: 33.17 KG/M2 | WEIGHT: 199.1 LBS | RESPIRATION RATE: 16 BRPM | HEIGHT: 65 IN | DIASTOLIC BLOOD PRESSURE: 85 MMHG | TEMPERATURE: 98.2 F | OXYGEN SATURATION: 95 %

## 2018-01-01 VITALS
RESPIRATION RATE: 18 BRPM | SYSTOLIC BLOOD PRESSURE: 130 MMHG | DIASTOLIC BLOOD PRESSURE: 99 MMHG | WEIGHT: 199 LBS | OXYGEN SATURATION: 94 % | TEMPERATURE: 97.3 F | HEIGHT: 65 IN | BODY MASS INDEX: 33.15 KG/M2 | HEART RATE: 79 BPM

## 2018-01-01 VITALS
DIASTOLIC BLOOD PRESSURE: 86 MMHG | HEART RATE: 86 BPM | TEMPERATURE: 97.5 F | RESPIRATION RATE: 18 BRPM | WEIGHT: 200 LBS | SYSTOLIC BLOOD PRESSURE: 118 MMHG | BODY MASS INDEX: 33.32 KG/M2 | HEIGHT: 65 IN

## 2018-01-01 VITALS
TEMPERATURE: 98.5 F | HEART RATE: 84 BPM | OXYGEN SATURATION: 95 % | WEIGHT: 201 LBS | RESPIRATION RATE: 18 BRPM | BODY MASS INDEX: 33.49 KG/M2 | SYSTOLIC BLOOD PRESSURE: 144 MMHG | DIASTOLIC BLOOD PRESSURE: 95 MMHG | HEIGHT: 65 IN

## 2018-01-01 VITALS
TEMPERATURE: 97.8 F | HEART RATE: 89 BPM | HEIGHT: 66 IN | SYSTOLIC BLOOD PRESSURE: 137 MMHG | RESPIRATION RATE: 18 BRPM | BODY MASS INDEX: 31.66 KG/M2 | DIASTOLIC BLOOD PRESSURE: 73 MMHG | WEIGHT: 197 LBS

## 2018-01-01 VITALS — BODY MASS INDEX: 33.06 KG/M2 | WEIGHT: 198.41 LBS | HEIGHT: 65 IN

## 2018-01-01 VITALS
BODY MASS INDEX: 33.49 KG/M2 | SYSTOLIC BLOOD PRESSURE: 143 MMHG | TEMPERATURE: 98.1 F | HEIGHT: 65 IN | RESPIRATION RATE: 20 BRPM | WEIGHT: 201 LBS | HEART RATE: 82 BPM | DIASTOLIC BLOOD PRESSURE: 88 MMHG

## 2018-01-01 VITALS
SYSTOLIC BLOOD PRESSURE: 144 MMHG | WEIGHT: 198 LBS | TEMPERATURE: 98.8 F | HEIGHT: 66 IN | OXYGEN SATURATION: 97 % | DIASTOLIC BLOOD PRESSURE: 86 MMHG | BODY MASS INDEX: 31.82 KG/M2 | HEART RATE: 72 BPM

## 2018-01-01 VITALS
DIASTOLIC BLOOD PRESSURE: 95 MMHG | TEMPERATURE: 97.7 F | HEART RATE: 80 BPM | WEIGHT: 198 LBS | RESPIRATION RATE: 20 BRPM | HEIGHT: 66 IN | OXYGEN SATURATION: 97 % | SYSTOLIC BLOOD PRESSURE: 159 MMHG | BODY MASS INDEX: 31.82 KG/M2

## 2018-01-01 VITALS
RESPIRATION RATE: 18 BRPM | SYSTOLIC BLOOD PRESSURE: 137 MMHG | TEMPERATURE: 97.8 F | DIASTOLIC BLOOD PRESSURE: 89 MMHG | OXYGEN SATURATION: 92 % | HEART RATE: 74 BPM

## 2018-01-01 DIAGNOSIS — C34.90 MALIGNANT NEOPLASM OF LUNG, UNSPECIFIED LATERALITY, UNSPECIFIED PART OF LUNG (HCC): Primary | ICD-10-CM

## 2018-01-01 DIAGNOSIS — C34.80 MALIGNANT NEOPLASM OF OVERLAPPING SITES OF LUNG, UNSPECIFIED LATERALITY (HCC): Primary | ICD-10-CM

## 2018-01-01 DIAGNOSIS — C34.91 SMALL CELL CARCINOMA OF RIGHT LUNG (HCC): Primary | ICD-10-CM

## 2018-01-01 DIAGNOSIS — R91.8 MASS OF UPPER LOBE OF RIGHT LUNG: Primary | ICD-10-CM

## 2018-01-01 DIAGNOSIS — C34.91 SMALL CELL CARCINOMA OF RIGHT LUNG (HCC): ICD-10-CM

## 2018-01-01 DIAGNOSIS — R06.02 SHORTNESS OF BREATH: ICD-10-CM

## 2018-01-01 DIAGNOSIS — C34.80 MALIGNANT NEOPLASM OF OVERLAPPING SITES OF LUNG, UNSPECIFIED LATERALITY (HCC): ICD-10-CM

## 2018-01-01 DIAGNOSIS — C34.90 MALIGNANT NEOPLASM OF LUNG, UNSPECIFIED LATERALITY, UNSPECIFIED PART OF LUNG (HCC): ICD-10-CM

## 2018-01-01 DIAGNOSIS — R91.8 MASS OF UPPER LOBE OF RIGHT LUNG: ICD-10-CM

## 2018-01-01 DIAGNOSIS — C34.11 MALIGNANT NEOPLASM OF UPPER LOBE OF RIGHT LUNG (HCC): Primary | ICD-10-CM

## 2018-01-01 DIAGNOSIS — J41.1 MUCOPURULENT CHRONIC BRONCHITIS (HCC): Primary | ICD-10-CM

## 2018-01-01 DIAGNOSIS — J41.1 MUCOPURULENT CHRONIC BRONCHITIS (HCC): ICD-10-CM

## 2018-01-01 LAB
ALBUMIN SERPL-MCNC: 4.08 G/DL (ref 3.2–4.8)
ALBUMIN SERPL-MCNC: 4.1 G/DL (ref 3.2–4.8)
ALBUMIN SERPL-MCNC: 4.11 G/DL (ref 3.2–4.8)
ALBUMIN SERPL-MCNC: 4.2 G/DL (ref 3.2–4.8)
ALBUMIN SERPL-MCNC: 4.2 G/DL (ref 3.2–4.8)
ALBUMIN SERPL-MCNC: 4.28 G/DL (ref 3.2–4.8)
ALBUMIN SERPL-MCNC: 4.3 G/DL (ref 3.2–4.8)
ALBUMIN SERPL-MCNC: 4.34 G/DL (ref 3.2–4.8)
ALBUMIN SERPL-MCNC: 4.4 G/DL (ref 3.2–4.8)
ALBUMIN/GLOB SERPL: 1.5 G/DL (ref 1.5–2.5)
ALBUMIN/GLOB SERPL: 1.6 G/DL (ref 1.5–2.5)
ALBUMIN/GLOB SERPL: 1.7 G/DL (ref 1.5–2.5)
ALBUMIN/GLOB SERPL: 1.8 G/DL (ref 1.5–2.5)
ALBUMIN/GLOB SERPL: 1.8 G/DL (ref 1.5–2.5)
ALBUMIN/GLOB SERPL: 1.9 G/DL (ref 1.5–2.5)
ALP SERPL-CCNC: 101 U/L (ref 25–100)
ALP SERPL-CCNC: 107 U/L (ref 25–100)
ALP SERPL-CCNC: 109 U/L (ref 25–100)
ALP SERPL-CCNC: 110 U/L (ref 25–100)
ALP SERPL-CCNC: 111 U/L (ref 25–100)
ALP SERPL-CCNC: 114 U/L (ref 25–100)
ALP SERPL-CCNC: 120 U/L (ref 25–100)
ALP SERPL-CCNC: 130 U/L (ref 25–100)
ALP SERPL-CCNC: 136 U/L (ref 25–100)
ALT SERPL W P-5'-P-CCNC: 39 U/L (ref 7–40)
ALT SERPL W P-5'-P-CCNC: 40 U/L (ref 7–40)
ALT SERPL W P-5'-P-CCNC: 42 U/L (ref 7–40)
ALT SERPL W P-5'-P-CCNC: 42 U/L (ref 7–40)
ALT SERPL W P-5'-P-CCNC: 50 U/L (ref 7–40)
ALT SERPL W P-5'-P-CCNC: 52 U/L (ref 7–40)
ALT SERPL W P-5'-P-CCNC: 54 U/L (ref 7–40)
ALT SERPL W P-5'-P-CCNC: 61 U/L (ref 7–40)
ALT SERPL W P-5'-P-CCNC: 63 U/L (ref 7–40)
ANION GAP SERPL CALCULATED.3IONS-SCNC: 13 MMOL/L (ref 3–11)
ANION GAP SERPL CALCULATED.3IONS-SCNC: 3 MMOL/L (ref 3–11)
ANION GAP SERPL CALCULATED.3IONS-SCNC: 4 MMOL/L (ref 3–11)
ANION GAP SERPL CALCULATED.3IONS-SCNC: 4 MMOL/L (ref 3–11)
ANION GAP SERPL CALCULATED.3IONS-SCNC: 5 MMOL/L (ref 3–11)
ANION GAP SERPL CALCULATED.3IONS-SCNC: 7 MMOL/L (ref 3–11)
ANION GAP SERPL CALCULATED.3IONS-SCNC: 8 MMOL/L (ref 3–11)
ANION GAP SERPL CALCULATED.3IONS-SCNC: 8 MMOL/L (ref 3–11)
ANION GAP SERPL CALCULATED.3IONS-SCNC: 9 MMOL/L (ref 3–11)
APTT PPP: 33.4 SECONDS (ref 24–31)
AST SERPL-CCNC: 27 U/L (ref 0–33)
AST SERPL-CCNC: 29 U/L (ref 0–33)
AST SERPL-CCNC: 30 U/L (ref 0–33)
AST SERPL-CCNC: 31 U/L (ref 0–33)
AST SERPL-CCNC: 36 U/L (ref 0–33)
AST SERPL-CCNC: 37 U/L (ref 0–33)
AST SERPL-CCNC: 37 U/L (ref 0–33)
AST SERPL-CCNC: 44 U/L (ref 0–33)
AST SERPL-CCNC: 48 U/L (ref 0–33)
BASOPHILS # BLD AUTO: 0.02 10*3/MM3 (ref 0–0.2)
BASOPHILS # BLD AUTO: 0.03 10*3/MM3 (ref 0–0.2)
BASOPHILS # BLD AUTO: 0.03 10*3/MM3 (ref 0–0.2)
BASOPHILS NFR BLD AUTO: 0.2 % (ref 0–1)
BASOPHILS NFR BLD AUTO: 0.4 % (ref 0–1)
BASOPHILS NFR BLD AUTO: 0.5 % (ref 0–1)
BILIRUB SERPL-MCNC: 0.4 MG/DL (ref 0.3–1.2)
BILIRUB SERPL-MCNC: 0.5 MG/DL (ref 0.3–1.2)
BILIRUB SERPL-MCNC: 0.6 MG/DL (ref 0.3–1.2)
BILIRUB SERPL-MCNC: 0.6 MG/DL (ref 0.3–1.2)
BILIRUB SERPL-MCNC: 0.7 MG/DL (ref 0.3–1.2)
BILIRUB SERPL-MCNC: 0.8 MG/DL (ref 0.3–1.2)
BUN BLD-MCNC: 10 MG/DL (ref 9–23)
BUN BLD-MCNC: 11 MG/DL (ref 9–23)
BUN BLD-MCNC: 12 MG/DL (ref 9–23)
BUN BLD-MCNC: 13 MG/DL (ref 9–23)
BUN BLD-MCNC: 13 MG/DL (ref 9–23)
BUN BLD-MCNC: 14 MG/DL (ref 9–23)
BUN BLD-MCNC: 18 MG/DL (ref 9–23)
BUN BLD-MCNC: 18 MG/DL (ref 9–23)
BUN BLD-MCNC: 9 MG/DL (ref 9–23)
BUN/CREAT SERPL: 10 (ref 7–25)
BUN/CREAT SERPL: 12 (ref 7–25)
BUN/CREAT SERPL: 14.4 (ref 7–25)
BUN/CREAT SERPL: 15.1 (ref 7–25)
BUN/CREAT SERPL: 15.5 (ref 7–25)
BUN/CREAT SERPL: 15.8 (ref 7–25)
BUN/CREAT SERPL: 16.4 (ref 7–25)
BUN/CREAT SERPL: 17.5 (ref 7–25)
BUN/CREAT SERPL: 20.5 (ref 7–25)
CALCIUM SPEC-SCNC: 8.7 MG/DL (ref 8.7–10.4)
CALCIUM SPEC-SCNC: 9 MG/DL (ref 8.7–10.4)
CALCIUM SPEC-SCNC: 9.1 MG/DL (ref 8.7–10.4)
CALCIUM SPEC-SCNC: 9.3 MG/DL (ref 8.7–10.4)
CALCIUM SPEC-SCNC: 9.5 MG/DL (ref 8.7–10.4)
CHLORIDE SERPL-SCNC: 103 MMOL/L (ref 99–109)
CHLORIDE SERPL-SCNC: 104 MMOL/L (ref 99–109)
CHLORIDE SERPL-SCNC: 106 MMOL/L (ref 99–109)
CHLORIDE SERPL-SCNC: 106 MMOL/L (ref 99–109)
CHLORIDE SERPL-SCNC: 107 MMOL/L (ref 99–109)
CHLORIDE SERPL-SCNC: 108 MMOL/L (ref 99–109)
CHLORIDE SERPL-SCNC: 109 MMOL/L (ref 99–109)
CO2 SERPL-SCNC: 25 MMOL/L (ref 20–31)
CO2 SERPL-SCNC: 25 MMOL/L (ref 20–31)
CO2 SERPL-SCNC: 26 MMOL/L (ref 20–31)
CO2 SERPL-SCNC: 27 MMOL/L (ref 20–31)
CO2 SERPL-SCNC: 27 MMOL/L (ref 20–31)
CO2 SERPL-SCNC: 28 MMOL/L (ref 20–31)
CO2 SERPL-SCNC: 28 MMOL/L (ref 20–31)
CORTIS AM PEAK SERPL-MCNC: 10.7 MCG/DL (ref 4.3–22.4)
CORTIS SERPL-MCNC: 11.5 MCG/DL
CORTIS SERPL-MCNC: 8.8 MCG/DL
CORTIS SERPL-MCNC: 9.4 MCG/DL
CREAT BLD-MCNC: 0.73 MG/DL (ref 0.6–1.3)
CREAT BLD-MCNC: 0.76 MG/DL (ref 0.6–1.3)
CREAT BLD-MCNC: 0.8 MG/DL (ref 0.6–1.3)
CREAT BLD-MCNC: 0.83 MG/DL (ref 0.6–1.3)
CREAT BLD-MCNC: 0.84 MG/DL (ref 0.6–1.3)
CREAT BLD-MCNC: 0.88 MG/DL (ref 0.6–1.3)
CREAT BLD-MCNC: 0.9 MG/DL (ref 0.6–1.3)
CREAT BLD-MCNC: 0.9 MG/DL (ref 0.6–1.3)
CREAT BLD-MCNC: 1.1 MG/DL (ref 0.6–1.3)
CREAT BLDA-MCNC: 0.7 MG/DL (ref 0.6–1.3)
CREAT BLDA-MCNC: 0.7 MG/DL (ref 0.6–1.3)
CREAT BLDA-MCNC: 0.8 MG/DL
CREAT BLDA-MCNC: 0.8 MG/DL (ref 0.6–1.3)
CREAT BLDA-MCNC: 0.9 MG/DL (ref 0.6–1.3)
CREAT BLDA-MCNC: 0.9 MG/DL (ref 0.6–1.3)
CREAT BLDA-MCNC: 1.1 MG/DL (ref 0.6–1.3)
CREAT BLDA-MCNC: 1.2 MG/DL (ref 0.6–1.3)
DEPRECATED RDW RBC AUTO: 48.1 FL (ref 37–54)
DEPRECATED RDW RBC AUTO: 49.3 FL (ref 37–54)
DEPRECATED RDW RBC AUTO: 49.6 FL (ref 37–54)
EOSINOPHIL # BLD AUTO: 0.04 10*3/MM3 (ref 0–0.3)
EOSINOPHIL # BLD AUTO: 0.12 10*3/MM3 (ref 0–0.3)
EOSINOPHIL # BLD AUTO: 0.22 10*3/MM3 (ref 0–0.3)
EOSINOPHIL NFR BLD AUTO: 0.5 % (ref 0–3)
EOSINOPHIL NFR BLD AUTO: 1.4 % (ref 0–3)
EOSINOPHIL NFR BLD AUTO: 3.6 % (ref 0–3)
ERYTHROCYTE [DISTWIDTH] IN BLOOD BY AUTOMATED COUNT: 15 % (ref 11.3–14.5)
ERYTHROCYTE [DISTWIDTH] IN BLOOD BY AUTOMATED COUNT: 15.1 % (ref 11.3–14.5)
ERYTHROCYTE [DISTWIDTH] IN BLOOD BY AUTOMATED COUNT: 15.1 % (ref 11.3–14.5)
ERYTHROCYTE [DISTWIDTH] IN BLOOD BY AUTOMATED COUNT: 15.4 % (ref 11.3–14.5)
ERYTHROCYTE [DISTWIDTH] IN BLOOD BY AUTOMATED COUNT: 15.7 % (ref 11.3–14.5)
ERYTHROCYTE [DISTWIDTH] IN BLOOD BY AUTOMATED COUNT: 16 % (ref 11.3–14.5)
ERYTHROCYTE [DISTWIDTH] IN BLOOD BY AUTOMATED COUNT: 16.1 % (ref 11.3–14.5)
ERYTHROCYTE [DISTWIDTH] IN BLOOD BY AUTOMATED COUNT: 16.3 % (ref 11.3–14.5)
ERYTHROCYTE [DISTWIDTH] IN BLOOD BY AUTOMATED COUNT: 16.6 % (ref 11.3–14.5)
GFR SERPL CREATININE-BSD FRML MDRD: 50 ML/MIN/1.73
GFR SERPL CREATININE-BSD FRML MDRD: 63 ML/MIN/1.73
GFR SERPL CREATININE-BSD FRML MDRD: 63 ML/MIN/1.73
GFR SERPL CREATININE-BSD FRML MDRD: 65 ML/MIN/1.73
GFR SERPL CREATININE-BSD FRML MDRD: 68 ML/MIN/1.73
GFR SERPL CREATININE-BSD FRML MDRD: 69 ML/MIN/1.73
GFR SERPL CREATININE-BSD FRML MDRD: 72 ML/MIN/1.73
GFR SERPL CREATININE-BSD FRML MDRD: 77 ML/MIN/1.73
GFR SERPL CREATININE-BSD FRML MDRD: 80 ML/MIN/1.73
GLOBULIN UR ELPH-MCNC: 2.3 GM/DL
GLOBULIN UR ELPH-MCNC: 2.6 GM/DL
GLOBULIN UR ELPH-MCNC: 2.7 GM/DL
GLOBULIN UR ELPH-MCNC: 2.7 GM/DL
GLOBULIN UR ELPH-MCNC: 2.8 GM/DL
GLUCOSE BLD-MCNC: 102 MG/DL (ref 70–100)
GLUCOSE BLD-MCNC: 105 MG/DL (ref 70–100)
GLUCOSE BLD-MCNC: 112 MG/DL (ref 70–100)
GLUCOSE BLD-MCNC: 113 MG/DL (ref 70–100)
GLUCOSE BLD-MCNC: 129 MG/DL (ref 70–100)
GLUCOSE BLD-MCNC: 141 MG/DL (ref 70–100)
GLUCOSE BLD-MCNC: 85 MG/DL (ref 70–100)
GLUCOSE BLD-MCNC: 89 MG/DL (ref 70–100)
GLUCOSE BLD-MCNC: 93 MG/DL (ref 70–100)
HBA1C MFR BLD: 6.2 % (ref 4.8–5.6)
HCT VFR BLD AUTO: 41.6 % (ref 34.5–44)
HCT VFR BLD AUTO: 42.8 % (ref 34.5–44)
HCT VFR BLD AUTO: 43.2 % (ref 34.5–44)
HCT VFR BLD AUTO: 44.7 % (ref 34.5–44)
HCT VFR BLD AUTO: 45 % (ref 34.5–44)
HCT VFR BLD AUTO: 45.3 % (ref 34.5–44)
HCT VFR BLD AUTO: 46 % (ref 34.5–44)
HCT VFR BLD AUTO: 46.1 % (ref 34.5–44)
HCT VFR BLD AUTO: 47.5 % (ref 34.5–44)
HGB BLD-MCNC: 13.5 G/DL (ref 11.5–15.5)
HGB BLD-MCNC: 14 G/DL (ref 11.5–15.5)
HGB BLD-MCNC: 14.1 G/DL (ref 11.5–15.5)
HGB BLD-MCNC: 14.4 G/DL (ref 11.5–15.5)
HGB BLD-MCNC: 14.6 G/DL (ref 11.5–15.5)
HGB BLD-MCNC: 14.9 G/DL (ref 11.5–15.5)
HGB BLD-MCNC: 14.9 G/DL (ref 11.5–15.5)
HGB BLD-MCNC: 15.1 G/DL (ref 11.5–15.5)
HGB BLD-MCNC: 15.4 G/DL (ref 11.5–15.5)
IMM GRANULOCYTES # BLD: 0.02 10*3/MM3 (ref 0–0.03)
IMM GRANULOCYTES # BLD: 0.04 10*3/MM3 (ref 0–0.03)
IMM GRANULOCYTES # BLD: 0.11 10*3/MM3 (ref 0–0.03)
IMM GRANULOCYTES NFR BLD: 0.3 % (ref 0–0.6)
IMM GRANULOCYTES NFR BLD: 0.5 % (ref 0–0.6)
IMM GRANULOCYTES NFR BLD: 1.3 % (ref 0–0.6)
INR PPP: 0.95 (ref 0.91–1.09)
LYMPHOCYTES # BLD AUTO: 0.83 10*3/MM3 (ref 0.6–4.8)
LYMPHOCYTES # BLD AUTO: 0.9 10*3/MM3 (ref 0.6–4.8)
LYMPHOCYTES # BLD AUTO: 1.1 10*3/MM3 (ref 0.6–4.8)
LYMPHOCYTES # BLD AUTO: 1.1 10*3/MM3 (ref 0.6–4.8)
LYMPHOCYTES # BLD AUTO: 1.2 10*3/MM3 (ref 0.6–4.8)
LYMPHOCYTES # BLD AUTO: 1.23 10*3/MM3 (ref 0.6–4.8)
LYMPHOCYTES # BLD AUTO: 1.41 10*3/MM3 (ref 0.6–4.8)
LYMPHOCYTES # BLD AUTO: 1.5 10*3/MM3 (ref 0.6–4.8)
LYMPHOCYTES # BLD AUTO: 1.6 10*3/MM3 (ref 0.6–4.8)
LYMPHOCYTES NFR BLD AUTO: 12 % (ref 24–44)
LYMPHOCYTES NFR BLD AUTO: 14.4 % (ref 24–44)
LYMPHOCYTES NFR BLD AUTO: 15.2 % (ref 24–44)
LYMPHOCYTES NFR BLD AUTO: 15.6 % (ref 24–44)
LYMPHOCYTES NFR BLD AUTO: 17.8 % (ref 24–44)
LYMPHOCYTES NFR BLD AUTO: 23.1 % (ref 24–44)
LYMPHOCYTES NFR BLD AUTO: 23.4 % (ref 24–44)
LYMPHOCYTES NFR BLD AUTO: 27.6 % (ref 24–44)
LYMPHOCYTES NFR BLD AUTO: 9.5 % (ref 24–44)
MCH RBC QN AUTO: 27.2 PG (ref 27–31)
MCH RBC QN AUTO: 27.6 PG (ref 27–31)
MCH RBC QN AUTO: 28.4 PG (ref 27–31)
MCH RBC QN AUTO: 28.7 PG (ref 27–31)
MCH RBC QN AUTO: 29 PG (ref 27–31)
MCH RBC QN AUTO: 29.1 PG (ref 27–31)
MCH RBC QN AUTO: 29.3 PG (ref 27–31)
MCH RBC QN AUTO: 29.7 PG (ref 27–31)
MCH RBC QN AUTO: 30 PG (ref 27–31)
MCHC RBC AUTO-ENTMCNC: 31.1 G/DL (ref 32–36)
MCHC RBC AUTO-ENTMCNC: 31.4 G/DL (ref 32–36)
MCHC RBC AUTO-ENTMCNC: 32 G/DL (ref 32–36)
MCHC RBC AUTO-ENTMCNC: 32.5 G/DL (ref 32–36)
MCHC RBC AUTO-ENTMCNC: 32.6 G/DL (ref 32–36)
MCHC RBC AUTO-ENTMCNC: 32.8 G/DL (ref 32–36)
MCHC RBC AUTO-ENTMCNC: 33.3 G/DL (ref 32–36)
MCHC RBC AUTO-ENTMCNC: 33.5 G/DL (ref 32–36)
MCHC RBC AUTO-ENTMCNC: 33.8 G/DL (ref 32–36)
MCV RBC AUTO: 86.6 FL (ref 80–99)
MCV RBC AUTO: 86.8 FL (ref 80–99)
MCV RBC AUTO: 87.5 FL (ref 80–99)
MCV RBC AUTO: 88.6 FL (ref 80–99)
MCV RBC AUTO: 88.7 FL (ref 80–99)
MCV RBC AUTO: 88.9 FL (ref 80–99)
MCV RBC AUTO: 89.2 FL (ref 80–99)
MCV RBC AUTO: 89.5 FL (ref 80–99)
MCV RBC AUTO: 89.7 FL (ref 80–99)
MONOCYTES # BLD AUTO: 0.2 10*3/MM3 (ref 0–1)
MONOCYTES # BLD AUTO: 0.3 10*3/MM3 (ref 0–1)
MONOCYTES # BLD AUTO: 0.6 10*3/MM3 (ref 0–1)
MONOCYTES # BLD AUTO: 0.9 10*3/MM3 (ref 0–1)
MONOCYTES # BLD AUTO: 1.02 10*3/MM3 (ref 0–1)
MONOCYTES # BLD AUTO: 1.04 10*3/MM3 (ref 0–1)
MONOCYTES # BLD AUTO: 1.14 10*3/MM3 (ref 0–1)
MONOCYTES NFR BLD AUTO: 11.7 % (ref 0–12)
MONOCYTES NFR BLD AUTO: 11.7 % (ref 0–12)
MONOCYTES NFR BLD AUTO: 12.6 % (ref 0–12)
MONOCYTES NFR BLD AUTO: 13.3 % (ref 0–12)
MONOCYTES NFR BLD AUTO: 17 % (ref 0–12)
MONOCYTES NFR BLD AUTO: 3.4 % (ref 0–12)
MONOCYTES NFR BLD AUTO: 3.9 % (ref 0–12)
MONOCYTES NFR BLD AUTO: 7.6 % (ref 0–12)
MONOCYTES NFR BLD AUTO: 8.5 % (ref 0–12)
NEUTROPHILS # BLD AUTO: 3.2 10*3/MM3 (ref 1.5–8.3)
NEUTROPHILS # BLD AUTO: 3.41 10*3/MM3 (ref 1.5–8.3)
NEUTROPHILS # BLD AUTO: 4.4 10*3/MM3 (ref 1.5–8.3)
NEUTROPHILS # BLD AUTO: 5 10*3/MM3 (ref 1.5–8.3)
NEUTROPHILS # BLD AUTO: 5.5 10*3/MM3 (ref 1.5–8.3)
NEUTROPHILS # BLD AUTO: 5.91 10*3/MM3 (ref 1.5–8.3)
NEUTROPHILS # BLD AUTO: 6.3 10*3/MM3 (ref 1.5–8.3)
NEUTROPHILS # BLD AUTO: 6.6 10*3/MM3 (ref 1.5–8.3)
NEUTROPHILS # BLD AUTO: 6.82 10*3/MM3 (ref 1.5–8.3)
NEUTROPHILS NFR BLD AUTO: 55.8 % (ref 41–71)
NEUTROPHILS NFR BLD AUTO: 60.7 % (ref 41–71)
NEUTROPHILS NFR BLD AUTO: 64 % (ref 41–71)
NEUTROPHILS NFR BLD AUTO: 69.2 % (ref 41–71)
NEUTROPHILS NFR BLD AUTO: 75.9 % (ref 41–71)
NEUTROPHILS NFR BLD AUTO: 78.1 % (ref 41–71)
NEUTROPHILS NFR BLD AUTO: 78.8 % (ref 41–71)
NEUTROPHILS NFR BLD AUTO: 80.4 % (ref 41–71)
NEUTROPHILS NFR BLD AUTO: 80.9 % (ref 41–71)
PLATELET # BLD AUTO: 154 10*3/MM3 (ref 150–450)
PLATELET # BLD AUTO: 169 10*3/MM3 (ref 150–450)
PLATELET # BLD AUTO: 178 10*3/MM3 (ref 150–450)
PLATELET # BLD AUTO: 187 10*3/MM3 (ref 150–450)
PLATELET # BLD AUTO: 189 10*3/MM3 (ref 150–450)
PLATELET # BLD AUTO: 195 10*3/MM3 (ref 150–450)
PLATELET # BLD AUTO: 204 10*3/MM3 (ref 150–450)
PLATELET # BLD AUTO: 230 10*3/MM3 (ref 150–450)
PLATELET # BLD AUTO: 239 10*3/MM3 (ref 150–450)
PMV BLD AUTO: 11.3 FL (ref 6–12)
PMV BLD AUTO: 11.3 FL (ref 6–12)
PMV BLD AUTO: 11.8 FL (ref 6–12)
PMV BLD AUTO: 7.5 FL (ref 6–12)
PMV BLD AUTO: 7.7 FL (ref 6–12)
PMV BLD AUTO: 7.8 FL (ref 6–12)
PMV BLD AUTO: 7.9 FL (ref 6–12)
PMV BLD AUTO: 7.9 FL (ref 6–12)
PMV BLD AUTO: 8.1 FL (ref 6–12)
POTASSIUM BLD-SCNC: 3.5 MMOL/L (ref 3.5–5.5)
POTASSIUM BLD-SCNC: 3.7 MMOL/L (ref 3.5–5.5)
POTASSIUM BLD-SCNC: 3.8 MMOL/L (ref 3.5–5.5)
POTASSIUM BLD-SCNC: 3.9 MMOL/L (ref 3.5–5.5)
POTASSIUM BLD-SCNC: 3.9 MMOL/L (ref 3.5–5.5)
POTASSIUM BLD-SCNC: 4 MMOL/L (ref 3.5–5.5)
POTASSIUM BLD-SCNC: 4.1 MMOL/L (ref 3.5–5.5)
POTASSIUM BLD-SCNC: 4.1 MMOL/L (ref 3.5–5.5)
POTASSIUM BLD-SCNC: 4.4 MMOL/L (ref 3.5–5.5)
PROT SERPL-MCNC: 6.5 G/DL (ref 5.7–8.2)
PROT SERPL-MCNC: 6.6 G/DL (ref 5.7–8.2)
PROT SERPL-MCNC: 6.7 G/DL (ref 5.7–8.2)
PROT SERPL-MCNC: 6.8 G/DL (ref 5.7–8.2)
PROT SERPL-MCNC: 6.9 G/DL (ref 5.7–8.2)
PROT SERPL-MCNC: 6.9 G/DL (ref 5.7–8.2)
PROT SERPL-MCNC: 7.1 G/DL (ref 5.7–8.2)
PROTHROMBIN TIME: 10 SECONDS (ref 9.6–11.5)
RBC # BLD AUTO: 4.65 10*6/MM3 (ref 3.89–5.14)
RBC # BLD AUTO: 4.81 10*6/MM3 (ref 3.89–5.14)
RBC # BLD AUTO: 4.98 10*6/MM3 (ref 3.89–5.14)
RBC # BLD AUTO: 5.01 10*6/MM3 (ref 3.89–5.14)
RBC # BLD AUTO: 5.08 10*6/MM3 (ref 3.89–5.14)
RBC # BLD AUTO: 5.11 10*6/MM3 (ref 3.89–5.14)
RBC # BLD AUTO: 5.13 10*6/MM3 (ref 3.89–5.14)
RBC # BLD AUTO: 5.27 10*6/MM3 (ref 3.89–5.14)
RBC # BLD AUTO: 5.48 10*6/MM3 (ref 3.89–5.14)
SODIUM BLD-SCNC: 136 MMOL/L (ref 132–146)
SODIUM BLD-SCNC: 137 MMOL/L (ref 132–146)
SODIUM BLD-SCNC: 138 MMOL/L (ref 132–146)
SODIUM BLD-SCNC: 138 MMOL/L (ref 132–146)
SODIUM BLD-SCNC: 140 MMOL/L (ref 132–146)
SODIUM BLD-SCNC: 142 MMOL/L (ref 132–146)
SODIUM BLD-SCNC: 145 MMOL/L (ref 132–146)
T4 FREE SERPL-MCNC: 1.34 NG/DL (ref 0.89–1.76)
T4 FREE SERPL-MCNC: 1.43 NG/DL (ref 0.89–1.76)
T4 FREE SERPL-MCNC: 1.43 NG/DL (ref 0.89–1.76)
T4 FREE SERPL-MCNC: 1.44 NG/DL (ref 0.89–1.76)
T4 FREE SERPL-MCNC: 1.46 NG/DL (ref 0.89–1.76)
T4 FREE SERPL-MCNC: 1.58 NG/DL (ref 0.89–1.76)
T4 FREE SERPL-MCNC: 1.68 NG/DL (ref 0.89–1.76)
TSH SERPL DL<=0.05 MIU/L-ACNC: 1.53 MIU/ML (ref 0.35–5.35)
TSH SERPL DL<=0.05 MIU/L-ACNC: 1.62 MIU/ML (ref 0.35–5.35)
TSH SERPL DL<=0.05 MIU/L-ACNC: 1.83 MIU/ML (ref 0.35–5.35)
TSH SERPL DL<=0.05 MIU/L-ACNC: 2.21 MIU/ML (ref 0.35–5.35)
TSH SERPL DL<=0.05 MIU/L-ACNC: 2.46 MIU/ML (ref 0.35–5.35)
TSH SERPL DL<=0.05 MIU/L-ACNC: 2.79 MIU/ML (ref 0.35–5.35)
TSH SERPL DL<=0.05 MIU/L-ACNC: 3.4 MIU/ML (ref 0.35–5.35)
WBC NRBC COR # BLD: 5.3 10*3/MM3 (ref 3.5–10.8)
WBC NRBC COR # BLD: 6.11 10*3/MM3 (ref 3.5–10.8)
WBC NRBC COR # BLD: 6.4 10*3/MM3 (ref 3.5–10.8)
WBC NRBC COR # BLD: 6.8 10*3/MM3 (ref 3.5–10.8)
WBC NRBC COR # BLD: 7.3 10*3/MM3 (ref 3.5–10.8)
WBC NRBC COR # BLD: 7.8 10*3/MM3 (ref 3.5–10.8)
WBC NRBC COR # BLD: 8.1 10*3/MM3 (ref 3.5–10.8)
WBC NRBC COR # BLD: 8.54 10*3/MM3 (ref 3.5–10.8)
WBC NRBC COR # BLD: 8.73 10*3/MM3 (ref 3.5–10.8)

## 2018-01-01 PROCEDURE — 99213 OFFICE O/P EST LOW 20 MIN: CPT | Performed by: NURSE PRACTITIONER

## 2018-01-01 PROCEDURE — 99214 OFFICE O/P EST MOD 30 MIN: CPT | Performed by: INTERNAL MEDICINE

## 2018-01-01 PROCEDURE — 25010000002 NIVOLUMAB 40 MG/4ML SOLUTION 10 ML VIAL: Performed by: INTERNAL MEDICINE

## 2018-01-01 PROCEDURE — 25010000002 IOPAMIDOL 61 % SOLUTION: Performed by: INTERNAL MEDICINE

## 2018-01-01 PROCEDURE — 84439 ASSAY OF FREE THYROXINE: CPT | Performed by: INTERNAL MEDICINE

## 2018-01-01 PROCEDURE — 74177 CT ABD & PELVIS W/CONTRAST: CPT

## 2018-01-01 PROCEDURE — 84439 ASSAY OF FREE THYROXINE: CPT

## 2018-01-01 PROCEDURE — 25010000002 FENTANYL CITRATE (PF) 100 MCG/2ML SOLUTION: Performed by: NURSE ANESTHETIST, CERTIFIED REGISTERED

## 2018-01-01 PROCEDURE — 77280 THER RAD SIMULAJ FIELD SMPL: CPT | Performed by: RADIOLOGY

## 2018-01-01 PROCEDURE — 25010000002 IPILIMUMAB 50 MG/10ML SOLUTION 40 ML VIAL: Performed by: INTERNAL MEDICINE

## 2018-01-01 PROCEDURE — 71045 X-RAY EXAM CHEST 1 VIEW: CPT

## 2018-01-01 PROCEDURE — 82565 ASSAY OF CREATININE: CPT

## 2018-01-01 PROCEDURE — 84443 ASSAY THYROID STIM HORMONE: CPT

## 2018-01-01 PROCEDURE — 25010000002 IPILIMUMAB 50 MG/10ML SOLUTION 10 ML VIAL: Performed by: INTERNAL MEDICINE

## 2018-01-01 PROCEDURE — 77334 RADIATION TREATMENT AID(S): CPT | Performed by: RADIOLOGY

## 2018-01-01 PROCEDURE — 76000 FLUOROSCOPY <1 HR PHYS/QHP: CPT

## 2018-01-01 PROCEDURE — 96413 CHEMO IV INFUSION 1 HR: CPT

## 2018-01-01 PROCEDURE — 36561 INSERT TUNNELED CV CATH: CPT | Performed by: PHYSICIAN ASSISTANT

## 2018-01-01 PROCEDURE — 77373 STRTCTC BDY RAD THER TX DLVR: CPT | Performed by: RADIOLOGY

## 2018-01-01 PROCEDURE — 25010000002 HEPARIN FLUSH (PORCINE) 100 UNIT/ML SOLUTION: Performed by: INTERNAL MEDICINE

## 2018-01-01 PROCEDURE — 99215 OFFICE O/P EST HI 40 MIN: CPT | Performed by: INTERNAL MEDICINE

## 2018-01-01 PROCEDURE — 25010000002 PROPOFOL 1000 MG/ML EMULSION: Performed by: NURSE ANESTHETIST, CERTIFIED REGISTERED

## 2018-01-01 PROCEDURE — 77001 FLUOROGUIDE FOR VEIN DEVICE: CPT | Performed by: THORACIC SURGERY (CARDIOTHORACIC VASCULAR SURGERY)

## 2018-01-01 PROCEDURE — C1788 PORT, INDWELLING, IMP: HCPCS | Performed by: THORACIC SURGERY (CARDIOTHORACIC VASCULAR SURGERY)

## 2018-01-01 PROCEDURE — 77336 RADIATION PHYSICS CONSULT: CPT | Performed by: RADIOLOGY

## 2018-01-01 PROCEDURE — 71260 CT THORAX DX C+: CPT

## 2018-01-01 PROCEDURE — 80053 COMPREHEN METABOLIC PANEL: CPT

## 2018-01-01 PROCEDURE — 85730 THROMBOPLASTIN TIME PARTIAL: CPT | Performed by: PHYSICIAN ASSISTANT

## 2018-01-01 PROCEDURE — 0 GADOBENATE DIMEGLUMINE 529 MG/ML SOLUTION: Performed by: RADIOLOGY

## 2018-01-01 PROCEDURE — 85025 COMPLETE CBC W/AUTO DIFF WBC: CPT

## 2018-01-01 PROCEDURE — 77290 THER RAD SIMULAJ FIELD CPLX: CPT | Performed by: RADIOLOGY

## 2018-01-01 PROCEDURE — 70553 MRI BRAIN STEM W/O & W/DYE: CPT

## 2018-01-01 PROCEDURE — 77470 SPECIAL RADIATION TREATMENT: CPT | Performed by: RADIOLOGY

## 2018-01-01 PROCEDURE — 94375 RESPIRATORY FLOW VOLUME LOOP: CPT | Performed by: NURSE PRACTITIONER

## 2018-01-01 PROCEDURE — 82533 TOTAL CORTISOL: CPT

## 2018-01-01 PROCEDURE — 96417 CHEMO IV INFUS EACH ADDL SEQ: CPT

## 2018-01-01 PROCEDURE — 25010000002 NIVOLUMAB 40 MG/4ML SOLUTION 24 ML VIAL: Performed by: INTERNAL MEDICINE

## 2018-01-01 PROCEDURE — 82533 TOTAL CORTISOL: CPT | Performed by: INTERNAL MEDICINE

## 2018-01-01 PROCEDURE — 25010000002 VANCOMYCIN: Performed by: PHYSICIAN ASSISTANT

## 2018-01-01 PROCEDURE — S0260 H&P FOR SURGERY: HCPCS | Performed by: PHYSICIAN ASSISTANT

## 2018-01-01 PROCEDURE — S0260 H&P FOR SURGERY: HCPCS | Performed by: THORACIC SURGERY (CARDIOTHORACIC VASCULAR SURGERY)

## 2018-01-01 PROCEDURE — 96415 CHEMO IV INFUSION ADDL HR: CPT

## 2018-01-01 PROCEDURE — 36561 INSERT TUNNELED CV CATH: CPT | Performed by: THORACIC SURGERY (CARDIOTHORACIC VASCULAR SURGERY)

## 2018-01-01 PROCEDURE — 36415 COLL VENOUS BLD VENIPUNCTURE: CPT

## 2018-01-01 PROCEDURE — 36591 DRAW BLOOD OFF VENOUS DEVICE: CPT

## 2018-01-01 PROCEDURE — 25010000002 PROPOFOL 10 MG/ML EMULSION: Performed by: NURSE ANESTHETIST, CERTIFIED REGISTERED

## 2018-01-01 PROCEDURE — 77370 RADIATION PHYSICS CONSULT: CPT | Performed by: RADIOLOGY

## 2018-01-01 PROCEDURE — 77295 3-D RADIOTHERAPY PLAN: CPT | Performed by: RADIOLOGY

## 2018-01-01 PROCEDURE — 0 IOPAMIDOL 61 % SOLUTION: Performed by: NURSE PRACTITIONER

## 2018-01-01 PROCEDURE — 93005 ELECTROCARDIOGRAM TRACING: CPT

## 2018-01-01 PROCEDURE — 25010000002 HEPARIN (PORCINE) PER 1000 UNITS: Performed by: THORACIC SURGERY (CARDIOTHORACIC VASCULAR SURGERY)

## 2018-01-01 PROCEDURE — 77300 RADIATION THERAPY DOSE PLAN: CPT | Performed by: RADIOLOGY

## 2018-01-01 PROCEDURE — A9577 INJ MULTIHANCE: HCPCS | Performed by: RADIOLOGY

## 2018-01-01 PROCEDURE — 84443 ASSAY THYROID STIM HORMONE: CPT | Performed by: INTERNAL MEDICINE

## 2018-01-01 PROCEDURE — 85025 COMPLETE CBC W/AUTO DIFF WBC: CPT | Performed by: NURSE PRACTITIONER

## 2018-01-01 PROCEDURE — 80053 COMPREHEN METABOLIC PANEL: CPT | Performed by: NURSE PRACTITIONER

## 2018-01-01 PROCEDURE — 99214 OFFICE O/P EST MOD 30 MIN: CPT | Performed by: NURSE PRACTITIONER

## 2018-01-01 PROCEDURE — 94726 PLETHYSMOGRAPHY LUNG VOLUMES: CPT | Performed by: NURSE PRACTITIONER

## 2018-01-01 PROCEDURE — G0463 HOSPITAL OUTPT CLINIC VISIT: HCPCS

## 2018-01-01 PROCEDURE — 93010 ELECTROCARDIOGRAM REPORT: CPT | Performed by: INTERNAL MEDICINE

## 2018-01-01 PROCEDURE — 83036 HEMOGLOBIN GLYCOSYLATED A1C: CPT | Performed by: PHYSICIAN ASSISTANT

## 2018-01-01 PROCEDURE — 94729 DIFFUSING CAPACITY: CPT | Performed by: NURSE PRACTITIONER

## 2018-01-01 PROCEDURE — 85610 PROTHROMBIN TIME: CPT | Performed by: PHYSICIAN ASSISTANT

## 2018-01-01 DEVICE — POWERPORT CLEARVUE ISP IMPLANTABLE PORT WITH ATTACHABLE 8 F CHRONOFLEX SILK OPEN-ENDED SINGLE-LUMEN VENOUS CATHETER
Type: IMPLANTABLE DEVICE | Status: FUNCTIONAL
Brand: POWERPORT CLEARVUE, CHRONOFLEX SILK

## 2018-01-01 RX ORDER — SODIUM CHLORIDE 0.9 % (FLUSH) 0.9 %
10 SYRINGE (ML) INJECTION AS NEEDED
Status: DISCONTINUED | OUTPATIENT
Start: 2018-01-01 | End: 2018-01-01 | Stop reason: HOSPADM

## 2018-01-01 RX ORDER — TRAMADOL HYDROCHLORIDE 50 MG/1
50 TABLET ORAL EVERY 6 HOURS PRN
Qty: 120 TABLET | Refills: 0 | Status: SHIPPED | OUTPATIENT
Start: 2018-01-01

## 2018-01-01 RX ORDER — ONDANSETRON HYDROCHLORIDE 8 MG/1
8 TABLET, FILM COATED ORAL 3 TIMES DAILY PRN
Qty: 30 TABLET | Refills: 5 | Status: SHIPPED | OUTPATIENT
Start: 2018-01-01

## 2018-01-01 RX ORDER — SODIUM CHLORIDE 0.9 % (FLUSH) 0.9 %
10 SYRINGE (ML) INJECTION AS NEEDED
Status: CANCELLED | OUTPATIENT
Start: 2018-01-01

## 2018-01-01 RX ORDER — TEMAZEPAM 30 MG/1
30 CAPSULE ORAL NIGHTLY PRN
Qty: 30 CAPSULE | Refills: 2 | OUTPATIENT
Start: 2018-01-01 | End: 2018-01-01 | Stop reason: SDUPTHER

## 2018-01-01 RX ORDER — LIDOCAINE HYDROCHLORIDE 10 MG/ML
INJECTION, SOLUTION INFILTRATION; PERINEURAL AS NEEDED
Status: DISCONTINUED | OUTPATIENT
Start: 2018-01-01 | End: 2018-01-01 | Stop reason: HOSPADM

## 2018-01-01 RX ORDER — HEPARIN SODIUM (PORCINE) LOCK FLUSH IV SOLN 100 UNIT/ML 100 UNIT/ML
500 SOLUTION INTRAVENOUS AS NEEDED
OUTPATIENT
Start: 2018-01-01

## 2018-01-01 RX ORDER — ZOLPIDEM TARTRATE 10 MG/1
TABLET ORAL
Refills: 2 | COMMUNITY
Start: 2018-01-01

## 2018-01-01 RX ORDER — GABAPENTIN 600 MG/1
600 TABLET ORAL 3 TIMES DAILY
Qty: 90 TABLET | Refills: 3 | Status: SHIPPED | OUTPATIENT
Start: 2018-01-01

## 2018-01-01 RX ORDER — SODIUM CHLORIDE 9 MG/ML
250 INJECTION, SOLUTION INTRAVENOUS ONCE
Status: CANCELLED | OUTPATIENT
Start: 2018-01-01

## 2018-01-01 RX ORDER — ONDANSETRON 2 MG/ML
4 INJECTION INTRAMUSCULAR; INTRAVENOUS ONCE AS NEEDED
Status: DISCONTINUED | OUTPATIENT
Start: 2018-01-01 | End: 2018-01-01 | Stop reason: HOSPADM

## 2018-01-01 RX ORDER — SODIUM CHLORIDE 9 MG/ML
250 INJECTION, SOLUTION INTRAVENOUS ONCE
Status: DISCONTINUED | OUTPATIENT
Start: 2018-01-01 | End: 2018-01-01 | Stop reason: HOSPADM

## 2018-01-01 RX ORDER — FENTANYL CITRATE 50 UG/ML
INJECTION, SOLUTION INTRAMUSCULAR; INTRAVENOUS AS NEEDED
Status: DISCONTINUED | OUTPATIENT
Start: 2018-01-01 | End: 2018-01-01 | Stop reason: SURG

## 2018-01-01 RX ORDER — CHLORHEXIDINE GLUCONATE 500 MG/1
1 CLOTH TOPICAL EVERY 12 HOURS PRN
Status: DISCONTINUED | OUTPATIENT
Start: 2018-01-01 | End: 2018-01-01 | Stop reason: HOSPADM

## 2018-01-01 RX ORDER — SODIUM CHLORIDE, SODIUM LACTATE, POTASSIUM CHLORIDE, CALCIUM CHLORIDE 600; 310; 30; 20 MG/100ML; MG/100ML; MG/100ML; MG/100ML
9 INJECTION, SOLUTION INTRAVENOUS CONTINUOUS
Status: DISCONTINUED | OUTPATIENT
Start: 2018-01-01 | End: 2018-01-01 | Stop reason: HOSPADM

## 2018-01-01 RX ORDER — PILOCARPINE HYDROCHLORIDE 5 MG/1
TABLET, FILM COATED ORAL
Refills: 2 | COMMUNITY
Start: 2018-01-01

## 2018-01-01 RX ORDER — FENTANYL CITRATE 50 UG/ML
50 INJECTION, SOLUTION INTRAMUSCULAR; INTRAVENOUS
Status: DISCONTINUED | OUTPATIENT
Start: 2018-01-01 | End: 2018-01-01 | Stop reason: HOSPADM

## 2018-01-01 RX ORDER — SODIUM CHLORIDE 0.9 % (FLUSH) 0.9 %
1-10 SYRINGE (ML) INJECTION AS NEEDED
Status: DISCONTINUED | OUTPATIENT
Start: 2018-01-01 | End: 2018-01-01 | Stop reason: HOSPADM

## 2018-01-01 RX ORDER — EPHEDRINE SULFATE 50 MG/ML
5 INJECTION, SOLUTION INTRAVENOUS ONCE AS NEEDED
Status: DISCONTINUED | OUTPATIENT
Start: 2018-01-01 | End: 2018-01-01 | Stop reason: HOSPADM

## 2018-01-01 RX ORDER — SODIUM CHLORIDE 9 MG/ML
250 INJECTION, SOLUTION INTRAVENOUS ONCE
Status: COMPLETED | OUTPATIENT
Start: 2018-01-01 | End: 2018-01-01

## 2018-01-01 RX ORDER — DOXYCYCLINE HYCLATE 100 MG/1
100 CAPSULE ORAL 2 TIMES DAILY
COMMUNITY
End: 2018-01-01

## 2018-01-01 RX ORDER — CHLORHEXIDINE GLUCONATE 500 MG/1
1 CLOTH TOPICAL EVERY 12 HOURS PRN
Status: CANCELLED | OUTPATIENT
Start: 2018-01-01

## 2018-01-01 RX ORDER — ERYTHROMYCIN STEARATE 250 MG
TABLET ORAL 2 TIMES DAILY
COMMUNITY
End: 2018-01-01

## 2018-01-01 RX ORDER — OMEPRAZOLE 40 MG/1
40 CAPSULE, DELAYED RELEASE ORAL DAILY
Qty: 30 CAPSULE | Refills: 5 | Status: SHIPPED | OUTPATIENT
Start: 2018-01-01

## 2018-01-01 RX ORDER — FAMOTIDINE 20 MG/1
20 TABLET, FILM COATED ORAL ONCE
Status: COMPLETED | OUTPATIENT
Start: 2018-01-01 | End: 2018-01-01

## 2018-01-01 RX ORDER — SODIUM CHLORIDE 0.9 % (FLUSH) 0.9 %
10 SYRINGE (ML) INJECTION AS NEEDED
OUTPATIENT
Start: 2018-01-01

## 2018-01-01 RX ORDER — OMEPRAZOLE 40 MG/1
40 CAPSULE, DELAYED RELEASE ORAL DAILY
Qty: 30 CAPSULE | Refills: 5 | Status: SHIPPED | OUTPATIENT
Start: 2018-01-01 | End: 2018-01-01 | Stop reason: SDUPTHER

## 2018-01-01 RX ORDER — GABAPENTIN 600 MG/1
TABLET ORAL
Qty: 60 TABLET | Refills: 3 | Status: SHIPPED | OUTPATIENT
Start: 2018-01-01 | End: 2018-01-01 | Stop reason: SDUPTHER

## 2018-01-01 RX ORDER — SENNA AND DOCUSATE SODIUM 50; 8.6 MG/1; MG/1
1 TABLET, FILM COATED ORAL DAILY
COMMUNITY

## 2018-01-01 RX ORDER — LIDOCAINE HYDROCHLORIDE 10 MG/ML
0.5 INJECTION, SOLUTION EPIDURAL; INFILTRATION; INTRACAUDAL; PERINEURAL ONCE AS NEEDED
Status: DISCONTINUED | OUTPATIENT
Start: 2018-01-01 | End: 2018-01-01 | Stop reason: HOSPADM

## 2018-01-01 RX ORDER — LIDOCAINE HYDROCHLORIDE 10 MG/ML
INJECTION, SOLUTION EPIDURAL; INFILTRATION; INTRACAUDAL; PERINEURAL AS NEEDED
Status: DISCONTINUED | OUTPATIENT
Start: 2018-01-01 | End: 2018-01-01 | Stop reason: SURG

## 2018-01-01 RX ORDER — SODIUM CHLORIDE 9 MG/ML
250 INJECTION, SOLUTION INTRAVENOUS ONCE
OUTPATIENT
Start: 2018-10-23

## 2018-01-01 RX ORDER — LORAZEPAM 1 MG/1
1 TABLET ORAL EVERY 8 HOURS PRN
Qty: 90 TABLET | Refills: 2 | Status: SHIPPED | OUTPATIENT
Start: 2018-01-01 | End: 2018-01-01 | Stop reason: SDUPTHER

## 2018-01-01 RX ORDER — LORAZEPAM 1 MG/1
1 TABLET ORAL EVERY 8 HOURS PRN
Qty: 90 TABLET | Refills: 2 | Status: SHIPPED | OUTPATIENT
Start: 2018-01-01

## 2018-01-01 RX ORDER — DEXTROMETHORPHAN HYDROBROMIDE AND PROMETHAZINE HYDROCHLORIDE 15; 6.25 MG/5ML; MG/5ML
5 SYRUP ORAL 4 TIMES DAILY PRN
Qty: 180 ML | Refills: 0 | Status: SHIPPED | OUTPATIENT
Start: 2018-01-01

## 2018-01-01 RX ORDER — ERYTHROMYCIN 5 MG/G
OINTMENT OPHTHALMIC
Refills: 0 | COMMUNITY
Start: 2018-01-01 | End: 2018-01-01

## 2018-01-01 RX ORDER — PROPOFOL 10 MG/ML
VIAL (ML) INTRAVENOUS AS NEEDED
Status: DISCONTINUED | OUTPATIENT
Start: 2018-01-01 | End: 2018-01-01 | Stop reason: SURG

## 2018-01-01 RX ORDER — GABAPENTIN 600 MG/1
600 TABLET ORAL 2 TIMES DAILY
Qty: 60 TABLET | Refills: 2 | OUTPATIENT
Start: 2018-01-01 | End: 2018-01-01 | Stop reason: SDUPTHER

## 2018-01-01 RX ORDER — DOXYCYCLINE HYCLATE 100 MG/1
100 CAPSULE ORAL 2 TIMES DAILY
Qty: 20 CAPSULE | Refills: 0 | Status: SHIPPED | OUTPATIENT
Start: 2018-01-01

## 2018-01-01 RX ORDER — GABAPENTIN 600 MG/1
600 TABLET ORAL 3 TIMES DAILY
Qty: 60 TABLET | Refills: 3 | OUTPATIENT
Start: 2018-01-01 | End: 2018-01-01 | Stop reason: SDUPTHER

## 2018-01-01 RX ORDER — LIDOCAINE HYDROCHLORIDE 10 MG/ML
0.5 INJECTION, SOLUTION EPIDURAL; INFILTRATION; INTRACAUDAL; PERINEURAL ONCE AS NEEDED
Status: COMPLETED | OUTPATIENT
Start: 2018-01-01 | End: 2018-01-01

## 2018-01-01 RX ORDER — GABAPENTIN 300 MG/1
300 CAPSULE ORAL 2 TIMES DAILY
Qty: 60 CAPSULE | Refills: 2 | OUTPATIENT
Start: 2018-01-01 | End: 2018-01-01 | Stop reason: DRUGHIGH

## 2018-01-01 RX ORDER — TIZANIDINE 2 MG/1
TABLET ORAL
Refills: 0 | COMMUNITY
Start: 2018-01-01

## 2018-01-01 RX ORDER — TEMAZEPAM 30 MG/1
30 CAPSULE ORAL NIGHTLY PRN
Qty: 30 CAPSULE | Refills: 2 | OUTPATIENT
Start: 2018-01-01

## 2018-01-01 RX ORDER — LORAZEPAM 1 MG/1
1 TABLET ORAL EVERY 8 HOURS PRN
Qty: 90 TABLET | Refills: 2 | OUTPATIENT
Start: 2018-01-01 | End: 2018-01-01 | Stop reason: SDUPTHER

## 2018-01-01 RX ORDER — GABAPENTIN 600 MG/1
600 TABLET ORAL 2 TIMES DAILY
Qty: 60 TABLET | Refills: 3 | OUTPATIENT
Start: 2018-01-01 | End: 2018-01-01 | Stop reason: SDUPTHER

## 2018-01-01 RX ORDER — FAMOTIDINE 20 MG/1
20 TABLET, FILM COATED ORAL ONCE
Status: DISCONTINUED | OUTPATIENT
Start: 2018-01-01 | End: 2018-01-01 | Stop reason: HOSPADM

## 2018-01-01 RX ORDER — SODIUM CHLORIDE 9 MG/ML
250 INJECTION, SOLUTION INTRAVENOUS ONCE
OUTPATIENT
Start: 2018-11-20

## 2018-01-01 RX ORDER — LIDOCAINE AND PRILOCAINE 25; 25 MG/G; MG/G
CREAM TOPICAL AS NEEDED
Qty: 30 G | Refills: 3 | Status: SHIPPED | OUTPATIENT
Start: 2018-01-01

## 2018-01-01 RX ADMIN — HEPARIN 500 UNITS: 100 SYRINGE at 13:27

## 2018-01-01 RX ADMIN — SODIUM CHLORIDE 90 MG: 9 INJECTION, SOLUTION INTRAVENOUS at 12:22

## 2018-01-01 RX ADMIN — HEPARIN 500 UNITS: 100 SYRINGE at 14:37

## 2018-01-01 RX ADMIN — VANCOMYCIN HYDROCHLORIDE 1250 MG: 1 INJECTION, POWDER, LYOPHILIZED, FOR SOLUTION INTRAVENOUS at 08:14

## 2018-01-01 RX ADMIN — FAMOTIDINE 20 MG: 20 TABLET ORAL at 07:35

## 2018-01-01 RX ADMIN — SODIUM CHLORIDE 480 MG: 9 INJECTION, SOLUTION INTRAVENOUS at 12:41

## 2018-01-01 RX ADMIN — EPHEDRINE SULFATE 10 MG: 50 INJECTION INTRAMUSCULAR; INTRAVENOUS; SUBCUTANEOUS at 09:21

## 2018-01-01 RX ADMIN — PROPOFOL 100 MCG/KG/MIN: 10 INJECTION, EMULSION INTRAVENOUS at 09:20

## 2018-01-01 RX ADMIN — GADOBENATE DIMEGLUMINE 15 ML: 529 INJECTION, SOLUTION INTRAVENOUS at 15:40

## 2018-01-01 RX ADMIN — HEPARIN 500 UNITS: 100 SYRINGE at 14:14

## 2018-01-01 RX ADMIN — SODIUM CHLORIDE, PRESERVATIVE FREE 500 UNITS: 5 INJECTION INTRAVENOUS at 14:11

## 2018-01-01 RX ADMIN — SODIUM CHLORIDE 90 MG: 9 INJECTION, SOLUTION INTRAVENOUS at 11:56

## 2018-01-01 RX ADMIN — SODIUM CHLORIDE 250 ML: 9 INJECTION, SOLUTION INTRAVENOUS at 11:51

## 2018-01-01 RX ADMIN — SODIUM CHLORIDE 480 MG: 9 INJECTION, SOLUTION INTRAVENOUS at 11:30

## 2018-01-01 RX ADMIN — Medication 10 ML: at 14:14

## 2018-01-01 RX ADMIN — Medication 10 ML: at 13:27

## 2018-01-01 RX ADMIN — LIDOCAINE HYDROCHLORIDE 0.2 ML: 10 INJECTION, SOLUTION EPIDURAL; INFILTRATION; INTRACAUDAL; PERINEURAL at 07:28

## 2018-01-01 RX ADMIN — LIDOCAINE HYDROCHLORIDE 50 MG: 10 INJECTION, SOLUTION EPIDURAL; INFILTRATION; INTRACAUDAL; PERINEURAL at 09:15

## 2018-01-01 RX ADMIN — SODIUM CHLORIDE, PRESERVATIVE FREE 500 UNITS: 5 INJECTION INTRAVENOUS at 13:46

## 2018-01-01 RX ADMIN — Medication 10 ML: at 13:46

## 2018-01-01 RX ADMIN — SODIUM CHLORIDE, POTASSIUM CHLORIDE, SODIUM LACTATE AND CALCIUM CHLORIDE 9 ML/HR: 600; 310; 30; 20 INJECTION, SOLUTION INTRAVENOUS at 07:28

## 2018-01-01 RX ADMIN — IOPAMIDOL 75 ML: 612 INJECTION, SOLUTION INTRAVENOUS at 10:55

## 2018-01-01 RX ADMIN — Medication 10 ML: at 12:02

## 2018-01-01 RX ADMIN — FENTANYL CITRATE 100 MCG: 50 INJECTION, SOLUTION INTRAMUSCULAR; INTRAVENOUS at 09:15

## 2018-01-01 RX ADMIN — IOPAMIDOL 90 ML: 612 INJECTION, SOLUTION INTRAVENOUS at 13:25

## 2018-01-01 RX ADMIN — HEPARIN 500 UNITS: 100 SYRINGE at 12:02

## 2018-01-01 RX ADMIN — SODIUM CHLORIDE 90 MG: 9 INJECTION, SOLUTION INTRAVENOUS at 11:51

## 2018-01-01 RX ADMIN — HEPARIN 500 UNITS: 100 SYRINGE at 13:17

## 2018-01-01 RX ADMIN — SODIUM CHLORIDE 90 MG: 9 INJECTION, SOLUTION INTRAVENOUS at 11:25

## 2018-01-01 RX ADMIN — SODIUM CHLORIDE 270 MG: 9 INJECTION, SOLUTION INTRAVENOUS at 12:29

## 2018-01-01 RX ADMIN — IOPAMIDOL 100 ML: 612 INJECTION, SOLUTION INTRAVENOUS at 10:22

## 2018-01-01 RX ADMIN — SODIUM CHLORIDE 270 MG: 9 INJECTION, SOLUTION INTRAVENOUS at 12:04

## 2018-01-01 RX ADMIN — SODIUM CHLORIDE 480 MG: 9 INJECTION, SOLUTION INTRAVENOUS at 12:43

## 2018-01-01 RX ADMIN — Medication 10 ML: at 14:11

## 2018-01-01 RX ADMIN — SODIUM CHLORIDE 270 MG: 9 INJECTION, SOLUTION INTRAVENOUS at 12:31

## 2018-01-01 RX ADMIN — SODIUM CHLORIDE 270 MG: 9 INJECTION, SOLUTION INTRAVENOUS at 12:59

## 2018-01-01 RX ADMIN — PROPOFOL 100 MG: 10 INJECTION, EMULSION INTRAVENOUS at 09:15

## 2018-01-08 NOTE — PROGRESS NOTES
DATE OF VISIT: 1/8/2018    REASON FOR VISIT: Limited stage small cell lung cancer T2N0M0.       HISTORY OF PRESENT ILLNESS: The patient is a very pleasant 62 y.o. female  with past medical history significant for small cell lung cancer diagnosed 12/30/2016. The patient presented with pneumonia unresponsive to antibiotics. Chest x-ray was completed that revealed a right upper lobe lung mass, confirmed on CT angiogram with multiple pulmonary nodules and left lower lobe infiltrate. He underwent bronchoscopy with biopsies on 12/30/2016 with pathology revealing small kevin lung cancer. The patient had staging work up including PET scan and MRI brain that failed to show evidence of distant metastatic disease. The patient was started on definitive treatment with cisplatin/etoposide with radiation on 2/8/2017. The pateint completed cycle # 4 on 04/21/2017. The patient elected to proceed with two additional cycles of cisplatin/etoposide, and completed cycle #6 on 6/8/2017. She is here today for scheduled follow up visit.       SUBJECTIVE: The patient is still recovering from recent upper airway infection.  She has been through 3 different rounds of oral antibiotics.  She is currently feeling better with mild nasal congestion.  She denied any fever or chills no recent travel or ill contacts.    PAST MEDICAL HISTORY/SOCIAL HISTORY/FAMILY HISTORY: Unchanged from my prior documentation done on 01/27/2017.    Review of Systems   Constitutional: Positive for fatigue. Negative for activity change, appetite change, chills, fever and unexpected weight change.   HENT: Negative for hearing loss, mouth sores, nosebleeds, sore throat and trouble swallowing.    Eyes: Negative for visual disturbance.   Respiratory: Positive for cough. Negative for chest tightness, shortness of breath and wheezing.    Cardiovascular: Negative for chest pain and palpitations.   Gastrointestinal: Negative for abdominal distention, abdominal pain, blood in  stool, constipation, diarrhea, rectal pain and vomiting.   Endocrine: Negative for cold intolerance and heat intolerance.   Genitourinary: Negative for difficulty urinating, dysuria, frequency and urgency.   Musculoskeletal: Positive for arthralgias. Negative for back pain, gait problem, joint swelling and myalgias.   Skin: Negative for rash.   Neurological: Positive for numbness. Negative for dizziness, tremors, syncope, weakness, light-headedness and headaches.   Hematological: Negative for adenopathy. Does not bruise/bleed easily.   Psychiatric/Behavioral: Positive for sleep disturbance. Negative for confusion and suicidal ideas. The patient is not nervous/anxious.          Current Outpatient Prescriptions:   •  acetaminophen (TYLENOL) 500 MG tablet, Take 650 mg by mouth 3 (Three) Times a Day As Needed for mild pain (1-3)., Disp: , Rfl:   •  amLODIPine (NORVASC) 5 MG tablet, TAKE 1 TABLET BY MOUTH EVERY DAY, Disp: 30 tablet, Rfl: 0  •  atorvastatin (LIPITOR) 40 MG tablet, Take 40 mg by mouth Every Night., Disp: , Rfl:   •  citalopram (CeleXA) 40 MG tablet, Take 1 tablet by mouth Daily., Disp: , Rfl: 0  •  gabapentin (NEURONTIN) 300 MG capsule, Take 1 capsule by mouth 2 (Two) Times a Day., Disp: 60 capsule, Rfl: 2  •  levothyroxine (SYNTHROID, LEVOTHROID) 88 MCG tablet, Take 88 mcg by mouth Daily., Disp: , Rfl:   •  LORazepam (ATIVAN) 1 MG tablet, TAKE 1 TABLET BY MOUTH EVERY 8 HOURS AS NEEDED FOR ANXIETY, Disp: 90 tablet, Rfl: 0  •  Naproxen Sodium (ALEVE PO), Take  by mouth., Disp: , Rfl:   •  omeprazole (priLOSEC) 40 MG capsule, TAKE 1 CAPSULE BY MOUTH DAILY, Disp: 30 capsule, Rfl: 5  •  oxyCODONE (ROXICODONE) 5 MG immediate release tablet, Take 1 tablet by mouth Every 6 (Six) Hours As Needed for Moderate Pain (4-6)., Disp: 30 tablet, Rfl: 0  •  Polyethylene Glycol 3350 (MIRALAX PO), Take  by mouth., Disp: , Rfl:   •  predniSONE (DELTASONE) 10 MG tablet, , Disp: , Rfl: 0  •  zolpidem (AMBIEN) 10 MG tablet, TAKE  1 TABLET BY MOUTH EVERY DAY AT BEDTIME AS NEEDED FOR SLEEP, Disp: 30 tablet, Rfl: 0  No current facility-administered medications for this visit.     PHYSICAL EXAMINATION:   /86  Pulse 96  Temp 97.3 °F (36.3 °C) (Temporal Artery )   Resp 19  Wt 88.5 kg (195 lb)  BMI 31.47 kg/m2   ECOG Performance Status: 1 - Symptomatic but completely ambulatory  General Appearance:  alert, cooperative, no apparent distress and appears stated age   Neurologic/Psychiatric: A&O x 3, gait steady, appropriate affect, strength 5/5 in all muscle groups   HEENT:  Normocephalic, without obvious abnormality, mucous membranes moist   Neck: Supple, symmetrical, trachea midline, no adenopathy;  No thyromegaly, masses, or tenderness   Lungs:   Clear to auscultation bilaterally; respirations regular, even, and unlabored bilaterally   Heart:  Regular rate and rhythm, no murmurs appreciated   Abdomen:   Soft, non-tender, non-distended and no organomegaly   Lymph nodes: No cervical, supraclavicular, inguinal or axillary adenopathy noted   Extremities: Normal, atraumatic; no clubbing, cyanosis, or edema    Skin: No rashes, ulcers, or suspicious lesions noted     No visits with results within 2 Week(s) from this visit.  Latest known visit with results is:    Lab on 10/09/2017   Component Date Value Ref Range Status   • Glucose 10/09/2017 88  70 - 100 mg/dL Final   • BUN 10/09/2017 15  9 - 23 mg/dL Final   • Creatinine 10/09/2017 0.80  0.60 - 1.30 mg/dL Final   • Sodium 10/09/2017 141  132 - 146 mmol/L Final   • Potassium 10/09/2017 4.4  3.5 - 5.5 mmol/L Final   • Chloride 10/09/2017 105  99 - 109 mmol/L Final   • CO2 10/09/2017 29.0  20.0 - 31.0 mmol/L Final   • Calcium 10/09/2017 10.1  8.7 - 10.4 mg/dL Final   • Total Protein 10/09/2017 6.6  5.7 - 8.2 g/dL Final   • Albumin 10/09/2017 4.30  3.20 - 4.80 g/dL Final   • ALT (SGPT) 10/09/2017 36  7 - 40 U/L Final   • AST (SGOT) 10/09/2017 28  0 - 33 U/L Final   • Alkaline Phosphatase  10/09/2017 107* 25 - 100 U/L Final   • Total Bilirubin 10/09/2017 0.4  0.3 - 1.2 mg/dL Final   • eGFR Non  Amer 10/09/2017 72  >60 mL/min/1.73 Final   • Globulin 10/09/2017 2.3  gm/dL Final   • A/G Ratio 10/09/2017 1.9  1.5 - 2.5 g/dL Final   • BUN/Creatinine Ratio 10/09/2017 18.8  7.0 - 25.0 Final   • Anion Gap 10/09/2017 7.0  3.0 - 11.0 mmol/L Final   • WBC 10/09/2017 5.51  3.50 - 10.80 10*3/mm3 Final   • RBC 10/09/2017 4.60  3.89 - 5.14 10*6/mm3 Final   • Hemoglobin 10/09/2017 13.8  11.5 - 15.5 g/dL Final   • Hematocrit 10/09/2017 41.8  34.5 - 44.0 % Final   • MCV 10/09/2017 90.9  80.0 - 99.0 fL Final   • MCH 10/09/2017 30.0  27.0 - 31.0 pg Final   • MCHC 10/09/2017 33.0  32.0 - 36.0 g/dL Final   • RDW 10/09/2017 13.9  11.3 - 14.5 % Final   • RDW-SD 10/09/2017 46.3  37.0 - 54.0 fl Final   • MPV 10/09/2017 10.5  6.0 - 12.0 fL Final   • Platelets 10/09/2017 218  150 - 450 10*3/mm3 Final   • Neutrophil % 10/09/2017 65.3  41.0 - 71.0 % Final   • Lymphocyte % 10/09/2017 18.0* 24.0 - 44.0 % Final   • Monocyte % 10/09/2017 14.7* 0.0 - 12.0 % Final   • Eosinophil % 10/09/2017 1.3  0.0 - 3.0 % Final   • Basophil % 10/09/2017 0.2  0.0 - 1.0 % Final   • Immature Grans % 10/09/2017 0.5  0.0 - 0.6 % Final   • Neutrophils, Absolute 10/09/2017 3.60  1.50 - 8.30 10*3/mm3 Final   • Lymphocytes, Absolute 10/09/2017 0.99  0.60 - 4.80 10*3/mm3 Final   • Monocytes, Absolute 10/09/2017 0.81  0.00 - 1.00 10*3/mm3 Final   • Eosinophils, Absolute 10/09/2017 0.07  0.00 - 0.30 10*3/mm3 Final   • Basophils, Absolute 10/09/2017 0.01  0.00 - 0.20 10*3/mm3 Final   • Immature Grans, Absolute 10/09/2017 0.03  0.00 - 0.03 10*3/mm3 Final        No results found.    ASSESSMENT: The patient is a very pleasant 61 y.o. female with small cell lung cancer.     PROBLEM LIST:  1. Presented with cough, shortness of breath, and pneumonia.   2. Right upper lobe mass with multiple scattered pulmonary nodules bilaterally.   3. Status post bronchoscopy  with biopsy revealing small cell lung cancer.   4. PET scan done 2/2/2017 shows disease in the right upper lobe with no evidence of distant metastatic disease. MRI brain negative on 2/2/2017  5.  Started definitive chemotherapy with radiation using cisplatin and etoposide on February 7, 2017.  Status post 6 cycles completed 6/8/2017.  6. COPD  7. History of cervical cancer status post hysterectomy.  8. History of tonsillar cancer status post chemo/radiation.   9. Hypertension.  10. Anxiety and depression.  11. Hypothyroidism.  12. 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  I reviewed the CAT scan results with the patient and her daughter. I reviewed the images myself. I told the patient she has increased size in the right upper lobe lung nodule when compared to previous images.   2. I told the patient this could represent scarring versus progressive disease. We will proceed with PET scan for further evaluation.   3. If this area if hypermetabolic, we may consider CyberKnife therapy to the area of residual disease.   4. I will present the patient's case and imaging at lung tumor board next week.   5. We will see the patient back in 1 week to go over the results.     6. We will follow up on the blood work results from today and notify her of any significant findings.   7. The patient will continue Zofran to be used as needed for nausea.   8. The patient declined prophylactic whole brain radiation.    9. We will continue Ambien 10 mg at bedtime for insomnia.   10. She will continue gabapentin 300 mg twice a day for chemotherapy induced neuropathy.   11. The patient will continue follow up with Dr. Mora for primary care with management of hypothyroidism and hypertension.          Scribed for Zay Tan MD by HELEN Paige. 1/8/2018  3:58 PM   Zay Tan MD  1/8/2018     I, Zay Tan MD, personally performed the services described in this documentation as scribed by the above named  individual in my presence, and it is both accurate and complete.  1/8/2018  3:58 PM

## 2018-01-15 NOTE — TELEPHONE ENCOUNTER
Patient advised that appeal for PET may take up to 30 days. Unsure if Dr Tan has a plan in place for patient, or if we need to wait for the PET. Advised her I will talk to him when he is back in the office tomorrow, and call her back with his recommendation.

## 2018-01-15 NOTE — TELEPHONE ENCOUNTER
----- Message from Erna DANNI Harding sent at 1/15/2018  8:41 AM EST -----  Regarding: [facility]-PET SCAN AND APPT.  Contact: [phone]  Patient called and said PET scan was denied does she still need to see Dr. Tan tomorrow? Oscar said this is the one Dr. Tan is appealing.

## 2018-01-18 NOTE — PROGRESS NOTES
"RE-CONSULTATION NOTE    NAME:      Leonarda Benitez  :                                                          1955  DATE OF RE-CONSULTATION:                     2018   REQUESTING PHYSICIAN:                   Zay Tan MD  REASON FOR RE-CONSULTATION:     Small cell carcinoma of right lung    Staging form: Lung, AJCC V7    - Clinical: Stage IB (T2a, N0, M0)          BRIEF HISTORY:  Leonarda Benitez  is a very pleasant 62 y.o. female  who completed combined chemoradiotherapy for small cell carcinoma lung.  She received 60 Gray in 30 fractions completing 2017.  After completion of combined therapy she had additional 2 cycles of cisplatin and etoposide.  She developed a respiratory infection and had a CT scan that revealed an increase in size of a  left pulmonary nodule and a right pulmonary nodule since 10/9/2017.  She has  evidence of disease outside of the lung..  I've been asked to see her regarding CyberKnife stereotactic radiosurgery to the enlarging nodules.  She denies hemoptysis, hematemesis, shortness of air or weight loss.              Allergies   Allergen Reactions   • Aspirin Itching   • Ciprofloxacin Myalgia     Affects muscles   • Codeine Itching   • Eggs Or Egg-Derived Products      PATIENT STATES \"HISTORY OF ALLERGY TESTING TOLD SHE HAD ALLERGY TO EGGS\"   • Hydrocodone Itching   • Penicillins Hives   • Sulfa Antibiotics Other (See Comments)     Upsets stomach       Social History   Substance Use Topics   • Smoking status: Former Smoker     Packs/day: 0.50     Years: 20.00     Types: Cigarettes     Quit date: 2009   • Smokeless tobacco: Never Used   • Alcohol use Yes      Comment: Occ       Past Medical History:   Diagnosis Date   • Arthritis    • Cancer    • COPD (chronic obstructive pulmonary disease)    • Coronary artery disease    • Cough with sputum     WHITE TO CLEAR SPUTUM    • Depression    • Disease of thyroid gland    • Diverticular disease    • Former smoker, " "stopped smoking many years ago - quit smoking 12/28/2009 12/28/2016   • GERD (gastroesophageal reflux disease) 12/28/2016   • History of cancer tonsil (2009) - treated with radiation 12/28/2016   • Hyperlipemia    • Hypertension     CONTROLLED WITH MEDS PER PT    • Insomnia 12/28/2016   • Peripheral edema     BUE --2 PLUS PITTING EDEMA, UNCHANGED SINCE DC FROM HOSPITAL 1/2/17   • Pneumonia     IV ABX INFUSION DAILY PER PICC AT [facility]    • Seizure     2009--DUE TO ELECTROLYTE IMBALANCE FOLLOWING CHEMO    • Wears dentures     UPPER AND LOWER PARTIAL    • Wears glasses        family history includes Heart attack in her father; Hypertension in her sister and sister; No Known Problems in her daughter; Stomach cancer in her mother. There is no history of Lung cancer or Diabetes.     Past Surgical History:   Procedure Laterality Date   • BRONCHOSCOPY N/A 12/30/2016    Procedure: BRONCHOSCOPY;  Surgeon: Ab Lockett MD;  Location:  [facility];  Service:    • BRONCHOSCOPY N/A 1/9/2017    Procedure: BRONCHOSCOPY WITH NAVIGATION AND FLUORO;  Surgeon: Ab Lockett MD;  Location:  [facility];  Service:    • BRONCHOSCOPY N/A 1/9/2017    Procedure: BRONCHOSCOPY WITH ENDOBRONCHIAL ULTRASOUND;  Surgeon: Ab Lockett MD;  Location:  [facility];  Service:    • COLONOSCOPY  2011   • HYSTERECTOMY  1982   • OTHER SURGICAL HISTORY  2008    tonsil bx-malignant received radiation        Review of Systems   Respiratory: Positive for cough (from bronchitis. It has improved.).    All other systems reviewed and are negative.          Objective   VITAL SIGNS:   Vitals:    01/18/18 0829   BP: 159/95   Pulse: 80   Resp: 20   Temp: 97.7 °F (36.5 °C)   SpO2: 97%   Weight: 89.8 kg (198 lb)   Height: 167.6 cm (66\")   PainSc: 0-No pain        KPS       90%    Physical Exam   Constitutional: She is oriented to person, place, and time. She appears well-developed and well-nourished. No distress.   HENT:   Head: Normocephalic and " atraumatic.   Eyes: EOM are normal. No scleral icterus.   Neck: Neck supple.   Cardiovascular: Normal rate, regular rhythm and normal heart sounds.  Exam reveals no gallop and no friction rub.    No murmur heard.  Pulmonary/Chest: Effort normal and breath sounds normal.   Abdominal: Soft.   Lymphadenopathy:     She has no cervical adenopathy.   Neurological: She is alert and oriented to person, place, and time. No cranial nerve deficit.   Skin: Skin is warm and dry.   Nursing note and vitals reviewed.  Muscle strength +5/5 equal and symmetric         The following portions of the patient's history were reviewed and updated as appropriate: allergies, current medications, past family history, past medical history, past social history, past surgical history and problem list.    Assessment      IMPRESSION:  Enlarging left and right upper lobe lung nodules    RECOMMENDATIONS:  Dr. Tan had ordered a PET scan for evaluation of the lung nodules but it was denied by the insurance company.  The enlarging nodules are suspicious for metastatic disease.  We will get an MRI of her brain to rule out any metastases.  I recommend CyberKnife stereotactic radiosurgery to the enlarging nodules.  She and her daughter watched the CyberKnife educational video and we discussed the pros and cons, risks and benefits of treatment.  She has had previous radiotherapy in this region.  Informed consent was obtained.  Prior to treatment planning we will obtain an MRI of the brain to rule out metastatic disease.  Thank you very much for letting me participate in the care of this very pleasant patient.         Lesa Cardenas MD      Errors in dictation may reflect use of voice recognition software and not all errors in transcription may have been detected prior to signing.

## 2018-02-23 NOTE — PROGRESS NOTES
DATE OF VISIT: 2/23/2018    REASON FOR VISIT: Limited stage small cell lung cancer T2N0M0.       HISTORY OF PRESENT ILLNESS: The patient is a very pleasant 62 y.o. female  with past medical history significant for small cell lung cancer diagnosed 12/30/2016. The patient presented with pneumonia unresponsive to antibiotics. Chest x-ray was completed that revealed a right upper lobe lung mass, confirmed on CT angiogram with multiple pulmonary nodules and left lower lobe infiltrate. He underwent bronchoscopy with biopsies on 12/30/2016 with pathology revealing small kevin lung cancer. The patient had staging work up including PET scan and MRI brain that failed to show evidence of distant metastatic disease. The patient was started on definitive treatment with cisplatin/etoposide with radiation on 2/8/2017. The pateint completed cycle # 4 on 04/21/2017. The patient elected to proceed with two additional cycles of cisplatin/etoposide, and completed cycle #6 on 6/8/2017. She is here today for scheduled follow up visit.       SUBJECTIVE: The patient is here today with her daughter.  She's been having on and off headaches.  She is anxious to the MRI results.  She is complaining of worsening numbness in her feet.    PAST MEDICAL HISTORY/SOCIAL HISTORY/FAMILY HISTORY: Unchanged from my prior documentation done on 01/27/2017.    Review of Systems   Constitutional: Positive for fatigue. Negative for activity change, appetite change, chills, fever and unexpected weight change.   HENT: Negative for hearing loss, mouth sores, nosebleeds, sore throat and trouble swallowing.    Eyes: Negative for visual disturbance.   Respiratory: Positive for cough. Negative for chest tightness, shortness of breath and wheezing.    Cardiovascular: Negative for chest pain and palpitations.   Gastrointestinal: Negative for abdominal distention, abdominal pain, blood in stool, constipation, diarrhea, rectal pain and vomiting.   Endocrine: Negative for  "cold intolerance and heat intolerance.   Genitourinary: Negative for difficulty urinating, dysuria, frequency and urgency.   Musculoskeletal: Positive for arthralgias. Negative for back pain, gait problem, joint swelling and myalgias.   Skin: Negative for rash.   Neurological: Positive for numbness and headaches. Negative for dizziness, tremors, syncope, weakness and light-headedness.   Hematological: Negative for adenopathy. Does not bruise/bleed easily.   Psychiatric/Behavioral: Positive for sleep disturbance. Negative for confusion and suicidal ideas. The patient is not nervous/anxious.          Current Outpatient Prescriptions:   •  acetaminophen (TYLENOL) 500 MG tablet, Take 650 mg by mouth 3 (Three) Times a Day As Needed for mild pain (1-3)., Disp: , Rfl:   •  amLODIPine (NORVASC) 5 MG tablet, TAKE 1 TABLET BY MOUTH EVERY DAY, Disp: 30 tablet, Rfl: 0  •  atorvastatin (LIPITOR) 40 MG tablet, Take 40 mg by mouth Every Night., Disp: , Rfl:   •  BREO ELLIPTA 100-25 MCG/INH aerosol powder , INL 1 PUFF PO D, Disp: , Rfl: 11  •  BREO ELLIPTA 100-25 MCG/INH aerosol powder , INHALE 1 PUFF BY MOUTH DAILY, Disp: 28 each, Rfl: 6  •  citalopram (CeleXA) 40 MG tablet, Take 1 tablet by mouth Daily., Disp: , Rfl: 0  •  gabapentin (NEURONTIN) 600 MG tablet, Take 1 tablet by mouth 2 (Two) Times a Day., Disp: 60 tablet, Rfl: 2  •  levothyroxine (SYNTHROID, LEVOTHROID) 88 MCG tablet, Take 88 mcg by mouth Daily., Disp: , Rfl:   •  LORazepam (ATIVAN) 1 MG tablet, TAKE 1 TABLET BY MOUTH EVERY 8 HOURS AS NEEDED FOR ANXIETY, Disp: 90 tablet, Rfl: 0  •  Naproxen Sodium (ALEVE PO), Take  by mouth., Disp: , Rfl:   •  omeprazole (priLOSEC) 40 MG capsule, Take 1 capsule by mouth Daily., Disp: 30 capsule, Rfl: 5  •  zolpidem (AMBIEN) 10 MG tablet, TAKE 1 TABLET BY MOUTH EVERY DAY AT BEDTIME AS NEEDED FOR SLEEP, Disp: 30 tablet, Rfl: 0    PHYSICAL EXAMINATION:   /73  Pulse 89  Temp 97.8 °F (36.6 °C)  Resp 18  Ht 167.6 cm (66\")  Wt " 89.4 kg (197 lb)  BMI 31.8 kg/m2   ECOG Performance Status: 1 - Symptomatic but completely ambulatory  General Appearance:  alert, cooperative, no apparent distress and appears stated age   Neurologic/Psychiatric: A&O x 3, gait steady, appropriate affect, strength 5/5 in all muscle groups   HEENT:  Normocephalic, without obvious abnormality, mucous membranes moist   Neck: Supple, symmetrical, trachea midline, no adenopathy;  No thyromegaly, masses, or tenderness   Lungs:   Clear to auscultation bilaterally; respirations regular, even, and unlabored bilaterally   Heart:  Regular rate and rhythm, no murmurs appreciated   Abdomen:   Soft, non-tender, non-distended and no organomegaly   Lymph nodes: No cervical, supraclavicular, inguinal or axillary adenopathy noted   Extremities: Normal, atraumatic; no clubbing, cyanosis, or edema    Skin: No rashes, ulcers, or suspicious lesions noted     Hospital Outpatient Visit on 02/22/2018   Component Date Value Ref Range Status   • Creatinine 02/22/2018 1.20  0.60 - 1.30 mg/dL Final    Serial Number: [id number]:  [id number]        Mri Brain With & Without Contrast    Result Date: 2/22/2018  Narrative: EXAMINATION: MRI BRAIN W/WO CONTRAST - 02/22/2018  INDICATION: Neuro deficits.  TECHNIQUE: Sagittal and axial T1 and axial T2 FLAIR and diffusion-weighted images of the brain and post gadolinium contrast axial, sagittal and coronal T1-weighted images. Additional thin-section post gadolinium contrast T1-weighted images were performed, per CyberKnife therapy protocol.  COMPARISON: 10/19/2017 brain MRI.  FINDINGS: Previous 10/09/2017 MRI report indicates chronic-appearing central white matter changes. No evidence of metastatic disease. History today indicates small cell lung cancer, staging. No given history of new symptoms.  No restricted diffusion is identified. FLAIR images show a stable pattern of central white matter disease apparently chronic. There is no evidence of new edema,  mass or mass effect, hemorrhage, hydrocephalus or abnormal extra-axial collection.  Postcontrast images show no evidence of pathologic brain or meningeal enhancement.      Impression: Stable brain MRI with chronic-appearing central white matter changes. No evidence of intracranial metastatic disease or other acute disease.   DICTATED:     02/22/2018 EDITED    :     02/22/2018  This report was finalized on 2/22/2018 10:32 PM by DR. Thang Lion MD.        ASSESSMENT: The patient is a very pleasant 61 y.o. female with small cell lung cancer.     PROBLEM LIST:  1. Presented with cough, shortness of breath, and pneumonia.   2. Right upper lobe mass with multiple scattered pulmonary nodules bilaterally.   3. Status post bronchoscopy with biopsy revealing small cell lung cancer.   4. PET scan done 2/2/2017 shows disease in the right upper lobe with no evidence of distant metastatic disease. MRI brain negative on 2/2/2017  5.  Started definitive chemotherapy with radiation using cisplatin and etoposide on February 7, 2017.  Status post 6 cycles completed 6/8/2017.  6. COPD  7. History of cervical cancer status post hysterectomy.  8. History of tonsillar cancer status post chemo/radiation.   9. Hypertension.  10. Anxiety and depression.  11. Hypothyroidism.  12. 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  Hypercholesterolemia    PLAN:  1.  I reviewed the MRI brain results with the patient and her daughter. I reviewed the images myself. I told the patient she has no evidence of metastatic disease.  2.  The patient scheduled to follow-up with Dr. Cardenas for possible CyberKnife radiation treatment.  3.  The patient will follow-up with me in April with repeat CT scans.  4. The patient will continue Zofran to be used as needed for nausea.   5. The patient declined prophylactic whole brain radiation.    6. We will continue Ambien 10 mg at bedtime for insomnia.   7. She will continue gabapentin, I will increase the dose to 600  mg twice a day for chemotherapy induced neuropathy.   8.  We'll continue Norvasc for hypertension  9.  We'll continue Synthroid for hypothyroid  10.  We'll continue Lipitor for hypercholesterolemia    Zay Tan MD  2/23/2018   4:10 PM

## 2018-02-26 NOTE — TELEPHONE ENCOUNTER
Pt called to let me know she had seen Dr. Tan and her MRI of brain was negative.  Pt was ready to move forward with treatment planning.  Pt stated she had friends coming in this week and wanted to wait until Monday March 5th for her sim.  Pt was given an appointment for 3/5/2018 @ 1300.  She verbalized understanding.

## 2018-04-24 NOTE — PLAN OF CARE
Outpatient Infusion • [address] [facility] • [address] • [place] [id number] • [phone]      CHEMOTHERAPY EDUCATION SHEET    NAME:  Leonarda Benitez      : 1955           DATE: 16    Booklets Given: Chemotherapy and You []  Eating Hints []    Sexuality/Fertility Books []     Chemotherapy/Biotherapy Education Sheets: (list all that apply)  Nivolumab, ipilimumab                                                                                                                                                                Chemotherapy Regimen: Nivolumab + ipilimumab every 21 days x4    TOPICS EDUCATION PROVIDED EDUCATION REINFORCED COMMENTS   ANEMIA:  role of RBC, cause, s/s, ways to manage, role of transfusion [] []    THROMBOCYTOPENIA:  role of platelet, cause, s/s, ways to prevent bleeding, things to avoid, when to seek help [] []    NEUTROPENIA:  role of WBC, cause, infection precautions, s/s of infection, when to call MD [] []    NUTRITION & APPETITE CHANGES:  importance of maintaining healthy diet & weight, ways to manage to improve intake, dietary consult, exercise regimen [] []    DIARRHEA:  causes, s/s of dehydration, ways to manage, dietary changes, when to call MD [x] [] Counseled patient on the likelihood of diarrhea associated with this chemotherapy regimen. Encouraged patient to treat mild diarrhea at home and counseled on the dosing of loperamide. Recommended patient to contact MD with severe diarrhea not responsive to OTC therapy. Counseled patient that diarrhea is colitis-induced   CONSTIPATION:  causes, ways to manage, dietary changes, when to call MD [] []    NAUSEA & VOMITING:  cause, use of antiemetics, dietary changes, when to call MD [x] [] Counseled patient on the likelihood of nausea and vomiting with this regimen. Explained the mechanism and role of the antiemetic medications, both at home and during infusion. Counseled patient on the use of at-home antiemetic agents at  first sign of nausea.    MOUTH SORES:  causes, oral care, ways to manage [] []    ALOPECIA:  cause, ways to manage, resources [] []    INFERTILITY & SEXUALITY:  causes, fertility preservation options, sexuality changes, ways to manage, importance of birth control [x] [] Counseled patient on the importance of safe sex practices for at least the first 48 hours following administration of chemotherapy.    NERVOUS SYSTEM CHANGES:  causes, s/s, neuropathies, cognitive changes, ways to manage [] []    PAIN:  causes, ways to manage [] [] ????   SKIN & NAIL CHANGES:  cause, s/s, ways to manage [x] [] Counseled patient on risk of rash and pruritus    ORGAN TOXICITIES:  cause, s/s, need for diagnostic tests, labs, when to notify MD [x] [] Counseled patient on the likelihood of renal, hepatic, and thyroid toxicity with this chemotherapy regimen. Explained to patient the purpose of blood tests to monitor organ function. Explained the patient the possibility of monitoring for reduced urine output at home.    SURVIVORSHIP:  distress, distress assessment, secondary malignancies, early/late effects, follow-up, social issues, social support [] []    HOME CARE:  use of spill kits, storing of PO chemo, how to manage bodily fluids [x] [] Counseled patient on the disposal and cleaning of soiled sheets/toilets. Explained the reasoning in preventing others from being exposed to the chemotherapy agents.    MISCELLANEOUS:  drug interactions, administration, vesicant, et [x] [] Counseled patient on risk of pneumonitis. Counseled on risk of infection, batsheva. With steroids      Referrals:        Notes: Patient had no additional questions after counseling. Encouraged patient to reach out to myself or a pharmacist with questions or concerns.     Thanks,  Femi Penny, PharmD Candidate  Ext. [phone]

## 2018-04-24 NOTE — PROGRESS NOTES
DATE OF VISIT: 4/24/2018    REASON FOR VISIT: Limited stage small cell lung cancer T2N0M0.       HISTORY OF PRESENT ILLNESS: The patient is a very pleasant 62 y.o. female  with past medical history significant for small cell lung cancer diagnosed 12/30/2016. The patient presented with pneumonia unresponsive to antibiotics. Chest x-ray was completed that revealed a right upper lobe lung mass, confirmed on CT angiogram with multiple pulmonary nodules and left lower lobe infiltrate. He underwent bronchoscopy with biopsies on 12/30/2016 with pathology revealing small kevin lung cancer. The patient had staging work up including PET scan and MRI brain that failed to show evidence of distant metastatic disease. The patient was started on definitive treatment with cisplatin/etoposide with radiation on 2/8/2017. The pateint completed cycle # 4 on 04/21/2017. The patient elected to proceed with two additional cycles of cisplatin/etoposide, and completed cycle #6 on 6/8/2017. She is here today for scheduled follow up visit.       SUBJECTIVE: The patient is here today with her daughter.  She is complaining of insomnia.  Ambien is not helping.  She did tolerate her radiation fairly well.    PAST MEDICAL HISTORY/SOCIAL HISTORY/FAMILY HISTORY: Unchanged from my prior documentation done on 01/27/2017.    Review of Systems   Constitutional: Positive for fatigue. Negative for activity change, appetite change, chills, fever and unexpected weight change.   HENT: Negative for hearing loss, mouth sores, nosebleeds, sore throat and trouble swallowing.    Eyes: Negative for visual disturbance.   Respiratory: Positive for cough. Negative for chest tightness, shortness of breath and wheezing.    Cardiovascular: Negative for chest pain and palpitations.   Gastrointestinal: Negative for abdominal distention, abdominal pain, blood in stool, constipation, diarrhea, rectal pain and vomiting.   Endocrine: Negative for cold intolerance and heat  "intolerance.   Genitourinary: Negative for difficulty urinating, dysuria, frequency and urgency.   Musculoskeletal: Positive for arthralgias. Negative for back pain, gait problem, joint swelling and myalgias.   Skin: Negative for rash.   Neurological: Positive for numbness and headaches. Negative for dizziness, tremors, syncope, weakness and light-headedness.   Hematological: Negative for adenopathy. Does not bruise/bleed easily.   Psychiatric/Behavioral: Positive for sleep disturbance. Negative for confusion and suicidal ideas. The patient is not nervous/anxious.          Current Outpatient Prescriptions:   •  acetaminophen (TYLENOL) 500 MG tablet, Take 650 mg by mouth 3 (Three) Times a Day As Needed for mild pain (1-3)., Disp: , Rfl:   •  amLODIPine (NORVASC) 5 MG tablet, TAKE 1 TABLET BY MOUTH EVERY DAY, Disp: 30 tablet, Rfl: 0  •  atorvastatin (LIPITOR) 40 MG tablet, Take 40 mg by mouth Every Night., Disp: , Rfl:   •  BREO ELLIPTA 200-25 MCG/INH aerosol powder , INHALE 1 PUFF D, Disp: , Rfl: 2  •  citalopram (CeleXA) 40 MG tablet, Take 1 tablet by mouth Daily., Disp: , Rfl: 0  •  erythromycin (ROMYCIN) 5 MG/GM ophthalmic ointment, BETTY A 1-CM RIBBON TO AFFECTED EYE FOUR TO FID, Disp: , Rfl: 0  •  gabapentin (NEURONTIN) 600 MG tablet, Take 1 tablet by mouth 2 (Two) Times a Day., Disp: 60 tablet, Rfl: 2  •  levothyroxine (SYNTHROID, LEVOTHROID) 88 MCG tablet, Take 88 mcg by mouth Daily., Disp: , Rfl:   •  LORazepam (ATIVAN) 1 MG tablet, TAKE 1 TABLET BY MOUTH EVERY 8 HOURS AS NEEDED FOR ANXIETY, Disp: 90 tablet, Rfl: 0  •  Naproxen Sodium (ALEVE PO), Take  by mouth., Disp: , Rfl:   •  omeprazole (priLOSEC) 40 MG capsule, Take 1 capsule by mouth Daily., Disp: 30 capsule, Rfl: 5  •  zolpidem (AMBIEN) 10 MG tablet, TAKE 1 TABLET BY MOUTH EVERY DAY AT BEDTIME AS NEEDED FOR SLEEP, Disp: 30 tablet, Rfl: 0    PHYSICAL EXAMINATION:   /86   Pulse 72   Temp 98.8 °F (37.1 °C) (Temporal Artery )   Ht 167.6 cm (65.98\") "   Wt 89.8 kg (198 lb)   SpO2 97%   BMI 31.97 kg/m²    ECOG Performance Status: 1 - Symptomatic but completely ambulatory  General Appearance:  alert, cooperative, no apparent distress and appears stated age   Neurologic/Psychiatric: A&O x 3, gait steady, appropriate affect, strength 5/5 in all muscle groups   HEENT:  Normocephalic, without obvious abnormality, mucous membranes moist   Neck: Supple, symmetrical, trachea midline, no adenopathy;  No thyromegaly, masses, or tenderness   Lungs:   Clear to auscultation bilaterally; respirations regular, even, and unlabored bilaterally   Heart:  Regular rate and rhythm, no murmurs appreciated   Abdomen:   Soft, non-tender, non-distended and no organomegaly   Lymph nodes: No cervical, supraclavicular, inguinal or axillary adenopathy noted   Extremities: Normal, atraumatic; no clubbing, cyanosis, or edema    Skin: No rashes, ulcers, or suspicious lesions noted     Hospital Outpatient Visit on 04/23/2018   Component Date Value Ref Range Status   • Creatinine 04/24/2018 0.80  0.60 - 1.30 mg/dL Final   Lab on 04/23/2018   Component Date Value Ref Range Status   • WBC 04/23/2018 8.73  3.50 - 10.80 10*3/mm3 Final   • RBC 04/23/2018 5.13  3.89 - 5.14 10*6/mm3 Final   • Hemoglobin 04/23/2018 15.4  11.5 - 15.5 g/dL Final   • Hematocrit 04/23/2018 46.0* 34.5 - 44.0 % Final   • MCV 04/23/2018 89.7  80.0 - 99.0 fL Final   • MCH 04/23/2018 30.0  27.0 - 31.0 pg Final   • MCHC 04/23/2018 33.5  32.0 - 36.0 g/dL Final   • RDW 04/23/2018 15.1* 11.3 - 14.5 % Final   • RDW-SD 04/23/2018 49.3  37.0 - 54.0 fl Final   • MPV 04/23/2018 11.3  6.0 - 12.0 fL Final   • Platelets 04/23/2018 195  150 - 450 10*3/mm3 Final   • Neutrophil % 04/23/2018 78.1* 41.0 - 71.0 % Final   • Lymphocyte % 04/23/2018 9.5* 24.0 - 44.0 % Final   • Monocyte % 04/23/2018 11.7  0.0 - 12.0 % Final   • Eosinophil % 04/23/2018 0.5  0.0 - 3.0 % Final   • Basophil % 04/23/2018 0.2  0.0 - 1.0 % Final   • Immature Grans %  04/23/2018 0.5  0.0 - 0.6 % Final   • Neutrophils, Absolute 04/23/2018 6.82  1.50 - 8.30 10*3/mm3 Final   • Lymphocytes, Absolute 04/23/2018 0.83  0.60 - 4.80 10*3/mm3 Final   • Monocytes, Absolute 04/23/2018 1.02* 0.00 - 1.00 10*3/mm3 Final   • Eosinophils, Absolute 04/23/2018 0.04  0.00 - 0.30 10*3/mm3 Final   • Basophils, Absolute 04/23/2018 0.02  0.00 - 0.20 10*3/mm3 Final   • Immature Grans, Absolute 04/23/2018 0.04* 0.00 - 0.03 10*3/mm3 Final      Ct Chest With Contrast    Result Date: 4/24/2018  Narrative: EXAMINATION: CT CHEST W CONTRAST-, CT ABDOMEN PELVIS W CONTRAST- 04/23/2018  INDICATION: f/u scan; C34.91-Malignant neoplasm of unspecified part of right bronchus or lung  TECHNIQUE: CT chest, abdomen and pelvis with intravenous contrast administration.  The radiation dose reduction device was turned on for each scan per the ALARA (As Low as Reasonably Achievable) protocol.  COMPARISON: CT dated 01/08/2018  FINDINGS:  CT CHEST: Thyroid is homogeneous in attenuation. No bulky mediastinal adenopathy. Central airways are patent. Atherosclerotic nonaneurysmal thoracic aorta. Esophagus in normal course and caliber to the level of the distal esophagus where there is a small to moderate hiatal hernia present. Cardiac size within normal limits without pericardial effusion. Trace left pleural effusion increased from prior. Extended lung windows demonstrate multiple noncalcified pulmonary nodules with overall progression from prior including 23 mm right upper lobe nodular focus and adjacent scarring previously 21 mm and left upper lobe pulmonary nodule 18 mm previously 16 mm. This is superimposed upon background chronic lung changes. Significant interval progression in right middle lobe peripheral nodularity now 13 mm previously 9 mm. Multilevel degenerative changes of the spine without aggressive osseous or soft tissue body wall lesions of concern. No bulky axillary adenopathy.  CT ABDOMEN AND PELVIS: Liver  demonstrates diffuse low-attenuation of the hepatic parenchyma consistent with hepatic steatosis. Gallbladder unremarkable. Pancreas and spleen within normal limits. Adrenals without distinct nodule. Kidneys without hydronephrosis or hydroureter demonstrating symmetric nephrogram and excretion with the exception of a largely exophytic left renal cortical cyst. No bulky retroperitoneal adenopathy. Atherosclerotic nonaneurysmal abdominal aorta. Portal veins and IVC are patent. GI tract evaluation demonstrates hiatal hernia again noted with large colonic stool burden and sigmoid diverticulosis however no focal thickening to suggest acute inflammation. No free fluid or intra-abdominal free air. Pelvic viscera grossly unremarkable without bulky pelvic adenopathy or free fluid demonstrating multiple calcified phleboliths. Degenerative changes of the spine without aggressive osseous or soft tissue body wall lesions of concern.      Impression: 1. Disease progression of multiple pulmonary nodules all of which are stable to larger in appearance including index nodules as above from prior comparison 01/08/2018 consistent with disease progression. 2. Trace left pleural effusion. 3. CT abdomen and pelvis without acute abnormality specifically no evidence for metastasis. 4. Hepatic steatosis.   D:  04/23/2018 E:  04/24/2018  This report was finalized on 4/24/2018 10:03 AM by Dr. Kd San.      Ct Abdomen Pelvis With Contrast    Result Date: 4/24/2018  Narrative: EXAMINATION: CT CHEST W CONTRAST-, CT ABDOMEN PELVIS W CONTRAST- 04/23/2018  INDICATION: f/u scan; C34.91-Malignant neoplasm of unspecified part of right bronchus or lung  TECHNIQUE: CT chest, abdomen and pelvis with intravenous contrast administration.  The radiation dose reduction device was turned on for each scan per the ALARA (As Low as Reasonably Achievable) protocol.  COMPARISON: CT dated 01/08/2018  FINDINGS:  CT CHEST: Thyroid is homogeneous in attenuation.  No bulky mediastinal adenopathy. Central airways are patent. Atherosclerotic nonaneurysmal thoracic aorta. Esophagus in normal course and caliber to the level of the distal esophagus where there is a small to moderate hiatal hernia present. Cardiac size within normal limits without pericardial effusion. Trace left pleural effusion increased from prior. Extended lung windows demonstrate multiple noncalcified pulmonary nodules with overall progression from prior including 23 mm right upper lobe nodular focus and adjacent scarring previously 21 mm and left upper lobe pulmonary nodule 18 mm previously 16 mm. This is superimposed upon background chronic lung changes. Significant interval progression in right middle lobe peripheral nodularity now 13 mm previously 9 mm. Multilevel degenerative changes of the spine without aggressive osseous or soft tissue body wall lesions of concern. No bulky axillary adenopathy.  CT ABDOMEN AND PELVIS: Liver demonstrates diffuse low-attenuation of the hepatic parenchyma consistent with hepatic steatosis. Gallbladder unremarkable. Pancreas and spleen within normal limits. Adrenals without distinct nodule. Kidneys without hydronephrosis or hydroureter demonstrating symmetric nephrogram and excretion with the exception of a largely exophytic left renal cortical cyst. No bulky retroperitoneal adenopathy. Atherosclerotic nonaneurysmal abdominal aorta. Portal veins and IVC are patent. GI tract evaluation demonstrates hiatal hernia again noted with large colonic stool burden and sigmoid diverticulosis however no focal thickening to suggest acute inflammation. No free fluid or intra-abdominal free air. Pelvic viscera grossly unremarkable without bulky pelvic adenopathy or free fluid demonstrating multiple calcified phleboliths. Degenerative changes of the spine without aggressive osseous or soft tissue body wall lesions of concern.      Impression: 1. Disease progression of multiple pulmonary  nodules all of which are stable to larger in appearance including index nodules as above from prior comparison 01/08/2018 consistent with disease progression. 2. Trace left pleural effusion. 3. CT abdomen and pelvis without acute abnormality specifically no evidence for metastasis. 4. Hepatic steatosis.   D:  04/23/2018 E:  04/24/2018  This report was finalized on 4/24/2018 10:03 AM by Dr. Kd San.    (  No results found.    ASSESSMENT: The patient is a very pleasant 61 y.o. female with small cell lung cancer.     PROBLEM LIST:  1. Presented with cough, shortness of breath, and pneumonia.   2. Right upper lobe mass with multiple scattered pulmonary nodules bilaterally.   3. Status post bronchoscopy with biopsy revealing small cell lung cancer.   4. PET scan done 2/2/2017 shows disease in the right upper lobe with no evidence of distant metastatic disease. MRI brain negative on 2/2/2017  5.  Started definitive chemotherapy with radiation using cisplatin and etoposide on February 7, 2017.  Status post 6 cycles completed 6/8/2017.  6.  Status post CyberKnife left upper lobe lung nodule completed on March 22, 2018  7.  Progressive disease doctor had CAT scan done April 23, 2018 with increasing size of right lung nodules   8.  We will start Opdivo plus Yervoy May 1, 2018  9. History of cervical cancer status post hysterectomy.  10. History of tonsillar cancer status post chemo/radiation.   11. Hypothyroidism.  12. 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  Hypercholesterolemia  14. Hypertension.  15.  COPD  16. Anxiety and depression.  PLAN:  1.  I did go over the CAT scan results with the patient daughter, I reviewed the films myself, I went over the pictures with both of them.  We have compare the current films to previous study done January 2018.  Patient has increasing size of right lung nodules.  2.  Patient is interested in active cancer treatment.  She will be started on Opdivo plus Yervoy this is in  compliance with NCCN guidelines.  3. We discussed potential side effects of immunotherapy including but not limited to immune mediated reactions with thyroiditis, pneumonitis, hepatitis, colitis, rash, and electrolytes abnormalities, fatigue, multiorgan failure, and possibly death.  4. The patient will continue Zofran to be used as needed for nausea.   5. The patient declined prophylactic whole brain radiation.    6.  I will change her Ambien to Restoril 30 mg every bedtime as needed for insomnia.   7. She will continue gabapentin, 600 mg twice a day for chemotherapy induced neuropathy.   8.  We'll continue Norvasc for hypertension  9.  We'll continue Synthroid for hypothyroid  10.  We'll continue Lipitor for hypercholesterolemia  11.  I will monitor patient blood work including blood counts kidney function and thyroid function and liver enzymes.  12.  The patient would follow-up with me in 4 weeks for cycle #2.  13.  I'll repeat her scans after cycle #4.  Zay Tan MD  4/24/2018   11:30 AM

## 2018-04-25 PROBLEM — C34.80 MALIGNANT NEOPLASM OF OVERLAPPING SITES OF LUNG (HCC): Status: ACTIVE | Noted: 2018-01-01

## 2018-04-25 NOTE — DISCHARGE INSTRUCTIONS

## 2018-04-25 NOTE — PROGRESS NOTES
FOLLOW UP NOTE    PATIENT:                                                      Leonarda Benitez  MEDICAL RECORD #:                        [id number]  :                                                          1955  COMPLETION DATE:   2018  DIAGNOSIS:     Small cell carcinoma of right lung    Staging form: Lung, AJCC V7    - Clinical: Stage IB (T2a, N0, M0) - Signed by Zay Tan MD on 2/3/2017      BRIEF HISTORY:  Leonarda Benitez  is a very pleasant 62 y.o. female  who completed combined chemoradiotherapy for small cell carcinoma lung.  She received 60 Gray in 30 fractions completing 2017.  After completion of combined therapy she had additional 2 cycles of cisplatin and etoposide.  She developed a respiratory infection and had a CT scan that revealed an increase in size of a  left pulmonary nodule and a right pulmonary nodule since 10/9/2017. She denied hemoptysis, hematemesis, shortness of air or weight loss.  She received 50 gray in 4 fractions to a left lung mass completing 3/22/2018.  Since completing therapy she has continued treatment per Dr. Tan.    MEDICATIONS: Medication reconciliation for the patient was reviewed and confirmed in the electronic medical record.    Review of Systems   Constitutional: Positive for fatigue.   Psychiatric/Behavioral: Positive for sleep disturbance (reports started restoril yesterday per Dr. Tan). The patient is nervous/anxious.    All other systems reviewed and are negative.      KPS 90%    Physical Exam   Constitutional: She appears well-developed and well-nourished.   Neck: Neck supple.   Cardiovascular: Normal rate, regular rhythm and normal heart sounds.    Pulmonary/Chest: Effort normal and breath sounds normal.   Nursing note and vitals reviewed.      VITAL SIGNS: Weight 199.1 temp 97.7 pulse 92 O2 sat on room air 92% blood pressure 143/86    The following portions of the patient's history were reviewed and updated as appropriate: allergies,  current medications, past family history, past medical history, past social history, past surgical history and problem list.            IMPRESSION: Mrs. Bneitez has no acute side effects of radiotherapy     RECOMMENDATIONS:  Mrs. Benitez is going to continue the mean therapy with Dr. Tan.  She will be started on Opdivo plus Yervoy.  We will see her back in our department as needed; she knows to call at any time if we can be of service to her.           Lesa Cardenas MD    Errors in dictation may reflect use of voice recognition software and not all errors in transcription may have been detected prior to signing.

## 2018-04-25 NOTE — PAT
ABNORMAL EKG CLEARED BY DR. MUJICA.      PT TOLD SEVERAL TIMES TO REMOVE HER RED NAIL POLISH BEFORE SURGERY TOMORROW.

## 2018-04-25 NOTE — PROGRESS NOTES
FOLLOW UP NOTE    PATIENT:                                                      Leonarda Benitez  MEDICAL RECORD #:                        [id number]  :                                                          1955  COMPLETION DATE:    2018  DIAGNOSIS:     Small cell carcinoma of right lung    Staging form: Lung, AJCC V7    - Clinical: Stage IB (T2a, N0, M0) - Signed by Zay Tan MD on 2/3/2017      BRIEF HISTORY:    ***    MEDICATIONS: Medication reconciliation for the patient was reviewed and confirmed in the electronic medical record.    Review of Systems   Constitutional: Positive for fatigue.   Psychiatric/Behavioral: Positive for sleep disturbance (reports started restoril yesterday per Dr. Tan). The patient is nervous/anxious.    All other systems reviewed and are negative.      KPS 90%    Physical Exam    VITAL SIGNS:   Vitals:    18 1028   BP: 143/86   Pulse: 92   Resp: 18   Temp: 97.7 °F (36.5 °C)   TempSrc: Temporal Artery    SpO2: 92%   Weight: 90.3 kg (199 lb 1.6 oz)   PainSc: 0-No pain       The following portions of the patient's history were reviewed and updated as appropriate: allergies, current medications, past family history, past medical history, past social history, past surgical history and problem list.            IMPRESSION:  ***    RECOMMENDATIONS:  ***           Donna Brannon RN    Errors in dictation may reflect use of voice recognition software and not all errors in transcription may have been detected prior to signing.

## 2018-04-26 NOTE — ANESTHESIA PREPROCEDURE EVALUATION
Anesthesia Evaluation     Patient summary reviewed and Nursing notes reviewed                Airway   Mallampati: II  TM distance: >3 FB  Neck ROM: full  No difficulty expected  Dental          Pulmonary    (+) lung cancer, COPD,   Cardiovascular     ECG reviewed  Rhythm: regular  Rate: normal    (+) hypertension, CAD, hyperlipidemia,       Neuro/Psych  (+) seizures,     GI/Hepatic/Renal/Endo    (+)  GERD,  hypothyroidism,     Musculoskeletal     Abdominal    Substance History      OB/GYN          Other   (+) arthritis                     Anesthesia Plan    ASA 3     MAC     intravenous induction   Anesthetic plan and risks discussed with patient and child.    Plan discussed with CRNA.

## 2018-04-26 NOTE — ANESTHESIA POSTPROCEDURE EVALUATION
Patient: Leonarda Benitez    Procedure Summary     Date:  04/26/18 Room / Location:   [facility] [address] [place] [address] [facility]    Anesthesia Start:  0907 Anesthesia Stop:      Procedure:  INSERTION VENOUS ACCESS DEVICE (N/A ) Diagnosis:       Mass of upper lobe of right lung      (Mass of upper lobe of right lung [R91.8])    Surgeon:  Enoch Tolentino MD Provider:  Steven Gresham MD    Anesthesia Type:  MAC ASA Status:  3          Anesthesia Type: MAC  Last vitals  BP   123/74   Temp   97.8   Pulse   78   Resp   16     SpO2   95%     Post Anesthesia Care and Evaluation    Patient location during evaluation: PACU  Patient participation: complete - patient participated  Level of consciousness: awake  Pain score: 0  Pain management: adequate  Airway patency: patent  Anesthetic complications: No anesthetic complications  PONV Status: none  Cardiovascular status: acceptable and stable  Respiratory status: nasal cannula, unassisted, acceptable and spontaneous ventilation  Hydration status: acceptable

## 2018-05-01 NOTE — PLAN OF CARE
Outpatient Infusion • [address] • [address] • [place] [id number] • [phone]      CHEMOTHERAPY EDUCATION SHEET    NAME:  Leonarda Benitez      : 1955           DATE: 18    Booklets Given: Chemotherapy and You [x]  Eating Hints [x]    Sexuality/Fertility Books []     Chemotherapy/Biotherapy Education Sheets: (list all that apply)  Nivolumab, ipilimumab, immuno-oncology education sheets                                                                                                                                                                Chemotherapy Regimen:  Nivolumab + ipilimumab every 21 days x 4 followed by nivolumab monotherapy    TOPICS EDUCATION PROVIDED EDUCATION REINFORCED COMMENTS   ANEMIA:  role of RBC, cause, s/s, ways to manage, role of transfusion [] [x] Discussed risk of anemia and encouraged to stay active.   THROMBOCYTOPENIA:  role of platelet, cause, s/s, ways to prevent bleeding, things to avoid, when to seek help [] []    NEUTROPENIA:  role of WBC, cause, infection precautions, s/s of infection, when to call MD [] [x] Discussed risk of infection and when to notify MD office; disucssed importance of hand washing.   NUTRITION & APPETITE CHANGES:  importance of maintaining healthy diet & weight, ways to manage to improve intake, dietary consult, exercise regimen [] [x] Discussed risk of decreased appetite.  Advised patient would meet a dietician during first treatment.   DIARRHEA:  causes, s/s of dehydration, ways to manage, dietary changes, when to call MD [] [x] Discussed risk of colitis and s/s.  Advised patient to call MD office to discuss # of stools per 24 hours to determine if steroids are needed.  Discussed the need for hydration if diarrhea occurs.   CONSTIPATION:  causes, ways to manage, dietary changes, when to call MD [] []    NAUSEA & VOMITING:  cause, use of antiemetics, dietary changes, when to call MD [] [x] Discussed minimal emetic risk and no  premeds were needed.   MOUTH SORES:  causes, oral care, ways to manage [] [x] Discussed preventive mouthwash with salt/baking soda/water and to notify MD office if sores develop.  Discussed using a soft brissle toothbrush and flossing.   ALOPECIA:  cause, ways to manage, resources [] []    INFERTILITY & SEXUALITY:  causes, fertility preservation options, sexuality changes, ways to manage, importance of birth control [] [x] Discussed safe sex practices.   NERVOUS SYSTEM CHANGES:  causes, s/s, neuropathies, cognitive changes, ways to manage [] []    PAIN:  causes, ways to manage [] [] ????   SKIN & NAIL CHANGES:  cause, s/s, ways to manage [] [x] Discussed risk of skin rash.  Advised pt to call if it becomes itchy or bothersome.  Discussed steroid cream is treatment for skin rash. Recommended sunscreen and a hat.   ORGAN TOXICITIES:  cause, s/s, need for diagnostic tests, labs, when to notify MD [] [x] Discussed labs to be monitored and indications: SCr (nephritis), LFTs (hepatitis), TSH (hyper/hypothyroidism).  Discussed risk of pneumonitis and s/s to look for.   SURVIVORSHIP:  distress, distress assessment, secondary malignancies, early/late effects, follow-up, social issues, social support [] []    HOME CARE:  use of spill kits, storing of PO chemo, how to manage bodily fluids [] []    MISCELLANEOUS:  drug interactions, administration, vesicant, et [] [x] Discussed mechanism of immunotherapy and how it differs from traditional chemotherapy.  Discussed infusion length is 30 minutes.     Referrals:        Notes:   Provided pt with my business card and advised to call with any questions/concerns.

## 2018-05-02 NOTE — PROGRESS NOTES
Onc Nutrition    Patient Name:  Leonarda Benitez  YOB: 1955    Diagnosis: limited stage small cell lung cancer  Radiation: 50 gray in 4 fractions (completed 3/22/18)  Immunotherapy: Opdivo + Yervoy - every 21 days x 4 cycles followed by Opdivo monotherapy    Weight: 200#; gained ~10# x 6 months  Nutrition Symptoms: no new complaints    Nutrition follow up with patient during her infusion appointment.  Note she is starting immunotherapy secondary to progressive disease.  She reports her appetite has been good and attributes her recent weight gain to steroids.  She denies significant/new nutritional complaints at this time.    Reviewed the importance of good nutrition during her treatment course.  Encouraged her to be consuming smaller more frequent meals/snacks throughout the day.  Advised her to have a good source of protein at each meal/snack.  And recommended she drink at least 64 ounces of water/hydrating fluids daily.    She denies nutritional questions/concerns at this time.  RD's contact information provided and encouraged her to call if questions arise.  She voiced understanding of information discussed.  Will follow up as indicated.    Electronically signed by:  Neida Chinchilla RD  05/02/18 1:24 PM

## 2018-05-14 NOTE — TELEPHONE ENCOUNTER
----- Message from Melvi Vasquez sent at 5/14/2018 12:58 PM EDT -----  Regarding: BADIN - SIDE EFFECTS  Contact: [phone]  PATIENT CALLED AND SAID SHE HAS A RASH ON HER CHEST AND LEG. SHE IS ALSO REALLY CONSTIPATED.

## 2018-05-14 NOTE — TELEPHONE ENCOUNTER
After discussing with Dr. Tan called patient to inform her to take Benadryl for the rash. She informed me that she was unable to to have BM. Informed her that she should take Miralax once in the morning and evening and also try to take Senakot 2 tabs in am and pm.

## 2018-05-22 NOTE — PROGRESS NOTES
"DATE OF VISIT: 5/22/2018    REASON FOR VISIT: Limited stage small cell lung cancer [id number].       HISTORY OF PRESENT ILLNESS: The patient is a very pleasant 62 y.o. female  with past medical history significant for small cell lung cancer diagnosed 12/30/2016. The patient presented with pneumonia unresponsive to antibiotics. Chest x-ray was completed that revealed a right upper lobe lung mass, confirmed on CT angiogram with multiple pulmonary nodules and left lower lobe infiltrate. He underwent bronchoscopy with biopsies on 12/30/2016 with pathology revealing small kevin lung cancer. The patient had staging work up including PET scan and MRI brain that failed to show evidence of distant metastatic disease. The patient was started on definitive treatment with cisplatin/etoposide with radiation on 2/8/2017. The pateint completed cycle # 4 on 04/21/2017. The patient elected to proceed with two additional cycles of cisplatin/etoposide, and completed cycle #6 on 6/8/2017. She had evidence of disease progression documented on CAT scan completed 4/23/2018. She was started on second line therapy using Opdivo/Yervoy on 5/1/2018. She is here today for scheduled follow up visit with treatment.     SUBJECTIVE: The patient is here today with her daughter. She did fairly well with her first cycle of immunotherapy. She complains of a mild skin rash to her chest and legs. It itches and she has tried topical Benadryl cream with only minimal relief in symptoms. She also notes a mild, non-productive cough that started about a week ago. She thinks it is related to allergies as she has had some mild nasal congestion as well. She used to take Claritin, but has not been on it recently. She is eating and drinking well. She had her port placed with no complications. She also complains of increased anxiety. She feels like she \"can't turn off her thoughts\" which interferes with her ability to sleep.     PAST MEDICAL HISTORY/SOCIAL HISTORY/FAMILY " HISTORY: Unchanged from my prior documentation done on 01/27/2017.    Review of Systems   Constitutional: Positive for fatigue. Negative for activity change, appetite change, chills, fever and unexpected weight change.   HENT: Negative for hearing loss, mouth sores, nosebleeds, sore throat and trouble swallowing.    Eyes: Negative for visual disturbance.   Respiratory: Positive for cough. Negative for chest tightness, shortness of breath and wheezing.    Cardiovascular: Negative for chest pain and palpitations.   Gastrointestinal: Negative for abdominal distention, abdominal pain, blood in stool, constipation, diarrhea, rectal pain and vomiting.   Endocrine: Negative for cold intolerance and heat intolerance.   Genitourinary: Negative for difficulty urinating, dysuria, frequency and urgency.   Musculoskeletal: Positive for arthralgias. Negative for back pain, gait problem, joint swelling and myalgias.   Skin: Negative for rash.   Neurological: Positive for numbness and headaches. Negative for dizziness, tremors, syncope, weakness and light-headedness.   Hematological: Negative for adenopathy. Does not bruise/bleed easily.   Psychiatric/Behavioral: Positive for sleep disturbance. Negative for confusion and suicidal ideas. The patient is not nervous/anxious.          Current Outpatient Prescriptions:   •  acetaminophen (TYLENOL) 500 MG tablet, Take 650 mg by mouth 3 (Three) Times a Day As Needed for mild pain (1-3)., Disp: , Rfl:   •  amLODIPine (NORVASC) 5 MG tablet, TAKE 1 TABLET BY MOUTH EVERY DAY, Disp: 30 tablet, Rfl: 0  •  atorvastatin (LIPITOR) 40 MG tablet, Take 40 mg by mouth Every Night., Disp: , Rfl:   •  BREO ELLIPTA 200-25 MCG/INH aerosol powder , INHALE 1 PUFF D, Disp: , Rfl: 2  •  citalopram (CeleXA) 40 MG tablet, Take 40 mg by mouth Daily., Disp: , Rfl: 0  •  gabapentin (NEURONTIN) 600 MG tablet, TAKE 1 TABLET BY MOUTH TWICE DAILY, Disp: 60 tablet, Rfl: 3  •  levothyroxine (SYNTHROID, LEVOTHROID) 88  "MCG tablet, Take 88 mcg by mouth Daily., Disp: , Rfl:   •  lidocaine-prilocaine (EMLA) 2.5-2.5 % cream, Apply  topically As Needed (45-60 minutes prior to port access.  Cover with saran/plastic wrap.)., Disp: 30 g, Rfl: 3  •  LORazepam (ATIVAN) 1 MG tablet, TAKE 1 TABLET BY MOUTH EVERY 8 HOURS AS NEEDED FOR ANXIETY, Disp: 90 tablet, Rfl: 0  •  Naproxen Sodium (ALEVE PO), Take 1 tablet/day by mouth Daily As Needed., Disp: , Rfl:   •  omeprazole (priLOSEC) 40 MG capsule, Take 1 capsule by mouth Daily., Disp: 30 capsule, Rfl: 5  •  ondansetron (ZOFRAN) 8 MG tablet, Take 1 tablet by mouth 3 (Three) Times a Day As Needed for Nausea or Vomiting., Disp: 30 tablet, Rfl: 5  •  temazepam (RESTORIL) 30 MG capsule, Take 1 capsule by mouth At Night As Needed for Sleep., Disp: 30 capsule, Rfl: 2    PHYSICAL EXAMINATION:   /85   Pulse 79   Temp 98.9 °F (37.2 °C) (Temporal Artery )   Resp 20   Ht 165.1 cm (65\")   Wt 90.3 kg (199 lb 1.6 oz)   SpO2 93%   BMI 33.13 kg/m²    ECOG Performance Status: 1 - Symptomatic but completely ambulatory  General Appearance:  alert, cooperative, no apparent distress and appears stated age   Neurologic/Psychiatric: A&O x 3, gait steady, appropriate affect, strength 5/5 in all muscle groups   HEENT:  Normocephalic, without obvious abnormality, mucous membranes moist   Neck: Supple, symmetrical, trachea midline, no adenopathy;  No thyromegaly, masses, or tenderness   Lungs:   Clear to auscultation bilaterally; respirations regular, even, and unlabored bilaterally   Heart:  Regular rate and rhythm, no murmurs appreciated   Abdomen:   Soft, non-tender, non-distended and no organomegaly   Lymph nodes: No cervical, supraclavicular, inguinal or axillary adenopathy noted   Extremities: Normal, atraumatic; no clubbing, cyanosis, or edema    Skin: No rashes, ulcers, or suspicious lesions noted     No visits with results within 2 Week(s) from this visit.   Latest known visit with results is: "   Infusion on 05/01/2018   Component Date Value Ref Range Status   • Glucose 05/01/2018 85  70 - 100 mg/dL Final   • BUN 05/01/2018 14  9 - 23 mg/dL Final   • Creatinine 05/01/2018 0.80  0.60 - 1.30 mg/dL Final   • Sodium 05/01/2018 140  132 - 146 mmol/L Final   • Potassium 05/01/2018 3.9  3.5 - 5.5 mmol/L Final   • Chloride 05/01/2018 109  99 - 109 mmol/L Final   • CO2 05/01/2018 27.0  20.0 - 31.0 mmol/L Final   • Calcium 05/01/2018 9.3  8.7 - 10.4 mg/dL Final   • Total Protein 05/01/2018 6.7  5.7 - 8.2 g/dL Final   • Albumin 05/01/2018 4.10  3.20 - 4.80 g/dL Final   • ALT (SGPT) 05/01/2018 50* 7 - 40 U/L Final   • AST (SGOT) 05/01/2018 37* 0 - 33 U/L Final   • Alkaline Phosphatase 05/01/2018 111* 25 - 100 U/L Final   • Total Bilirubin 05/01/2018 0.4  0.3 - 1.2 mg/dL Final   • eGFR Non African Amer 05/01/2018 72  >60 mL/min/1.73 Final   • Globulin 05/01/2018 2.6  gm/dL Final   • A/G Ratio 05/01/2018 1.6  1.5 - 2.5 g/dL Final   • BUN/Creatinine Ratio 05/01/2018 17.5  7.0 - 25.0 Final   • Anion Gap 05/01/2018 4.0  3.0 - 11.0 mmol/L Final   • WBC 05/01/2018 7.80  3.50 - 10.80 10*3/mm3 Final   • RBC 05/01/2018 4.65  3.89 - 5.14 10*6/mm3 Final   • Hemoglobin 05/01/2018 13.5  11.5 - 15.5 g/dL Final   • Hematocrit 05/01/2018 41.6  34.5 - 44.0 % Final   • RDW 05/01/2018 15.7* 11.3 - 14.5 % Final   • MCV 05/01/2018 89.5  80.0 - 99.0 fL Final   • MCH 05/01/2018 29.1  27.0 - 31.0 pg Final   • MCHC 05/01/2018 32.5  32.0 - 36.0 g/dL Final   • MPV 05/01/2018 7.8  6.0 - 12.0 fL Final   • Platelets 05/01/2018 187  150 - 450 10*3/mm3 Final   • Neutrophil % 05/01/2018 80.4* 41.0 - 71.0 % Final   • Lymphocyte % 05/01/2018 12.0* 24.0 - 44.0 % Final   • Monocyte % 05/01/2018 7.6  0.0 - 12.0 % Final   • Neutrophils, Absolute 05/01/2018 6.30  1.50 - 8.30 10*3/mm3 Final   • Lymphocytes, Absolute 05/01/2018 0.90  0.60 - 4.80 10*3/mm3 Final   • Monocytes, Absolute 05/01/2018 0.60  0.00 - 1.00 10*3/mm3 Final   • Creatinine 05/01/2018 0.80   0.60 - 1.30 mg/dL Final    Serial Number: [id number]:  [id number]      Ct Chest With Contrast    Result Date: 4/24/2018  Narrative: EXAMINATION: CT CHEST W CONTRAST-, CT ABDOMEN PELVIS W CONTRAST- 04/23/2018  INDICATION: f/u scan; C34.91-Malignant neoplasm of unspecified part of right bronchus or lung  TECHNIQUE: CT chest, abdomen and pelvis with intravenous contrast administration.  The radiation dose reduction device was turned on for each scan per the ALARA (As Low as Reasonably Achievable) protocol.  COMPARISON: CT dated 01/08/2018  FINDINGS:  CT CHEST: Thyroid is homogeneous in attenuation. No bulky mediastinal adenopathy. Central airways are patent. Atherosclerotic nonaneurysmal thoracic aorta. Esophagus in normal course and caliber to the level of the distal esophagus where there is a small to moderate hiatal hernia present. Cardiac size within normal limits without pericardial effusion. Trace left pleural effusion increased from prior. Extended lung windows demonstrate multiple noncalcified pulmonary nodules with overall progression from prior including 23 mm right upper lobe nodular focus and adjacent scarring previously 21 mm and left upper lobe pulmonary nodule 18 mm previously 16 mm. This is superimposed upon background chronic lung changes. Significant interval progression in right middle lobe peripheral nodularity now 13 mm previously 9 mm. Multilevel degenerative changes of the spine without aggressive osseous or soft tissue body wall lesions of concern. No bulky axillary adenopathy.  CT ABDOMEN AND PELVIS: Liver demonstrates diffuse low-attenuation of the hepatic parenchyma consistent with hepatic steatosis. Gallbladder unremarkable. Pancreas and spleen within normal limits. Adrenals without distinct nodule. Kidneys without hydronephrosis or hydroureter demonstrating symmetric nephrogram and excretion with the exception of a largely exophytic left renal cortical cyst. No bulky retroperitoneal  adenopathy. Atherosclerotic nonaneurysmal abdominal aorta. Portal veins and IVC are patent. GI tract evaluation demonstrates hiatal hernia again noted with large colonic stool burden and sigmoid diverticulosis however no focal thickening to suggest acute inflammation. No free fluid or intra-abdominal free air. Pelvic viscera grossly unremarkable without bulky pelvic adenopathy or free fluid demonstrating multiple calcified phleboliths. Degenerative changes of the spine without aggressive osseous or soft tissue body wall lesions of concern.      Impression: 1. Disease progression of multiple pulmonary nodules all of which are stable to larger in appearance including index nodules as above from prior comparison 01/08/2018 consistent with disease progression. 2. Trace left pleural effusion. 3. CT abdomen and pelvis without acute abnormality specifically no evidence for metastasis. 4. Hepatic steatosis.   D:  04/23/2018 E:  04/24/2018  This report was finalized on 4/24/2018 10:03 AM by Dr. Kd San.      Ct Abdomen Pelvis With Contrast    Result Date: 4/24/2018  Narrative: EXAMINATION: CT CHEST W CONTRAST-, CT ABDOMEN PELVIS W CONTRAST- 04/23/2018  INDICATION: f/u scan; C34.91-Malignant neoplasm of unspecified part of right bronchus or lung  TECHNIQUE: CT chest, abdomen and pelvis with intravenous contrast administration.  The radiation dose reduction device was turned on for each scan per the ALARA (As Low as Reasonably Achievable) protocol.  COMPARISON: CT dated 01/08/2018  FINDINGS:  CT CHEST: Thyroid is homogeneous in attenuation. No bulky mediastinal adenopathy. Central airways are patent. Atherosclerotic nonaneurysmal thoracic aorta. Esophagus in normal course and caliber to the level of the distal esophagus where there is a small to moderate hiatal hernia present. Cardiac size within normal limits without pericardial effusion. Trace left pleural effusion increased from prior. Extended lung windows demonstrate  multiple noncalcified pulmonary nodules with overall progression from prior including 23 mm right upper lobe nodular focus and adjacent scarring previously 21 mm and left upper lobe pulmonary nodule 18 mm previously 16 mm. This is superimposed upon background chronic lung changes. Significant interval progression in right middle lobe peripheral nodularity now 13 mm previously 9 mm. Multilevel degenerative changes of the spine without aggressive osseous or soft tissue body wall lesions of concern. No bulky axillary adenopathy.  CT ABDOMEN AND PELVIS: Liver demonstrates diffuse low-attenuation of the hepatic parenchyma consistent with hepatic steatosis. Gallbladder unremarkable. Pancreas and spleen within normal limits. Adrenals without distinct nodule. Kidneys without hydronephrosis or hydroureter demonstrating symmetric nephrogram and excretion with the exception of a largely exophytic left renal cortical cyst. No bulky retroperitoneal adenopathy. Atherosclerotic nonaneurysmal abdominal aorta. Portal veins and IVC are patent. GI tract evaluation demonstrates hiatal hernia again noted with large colonic stool burden and sigmoid diverticulosis however no focal thickening to suggest acute inflammation. No free fluid or intra-abdominal free air. Pelvic viscera grossly unremarkable without bulky pelvic adenopathy or free fluid demonstrating multiple calcified phleboliths. Degenerative changes of the spine without aggressive osseous or soft tissue body wall lesions of concern.      Impression: 1. Disease progression of multiple pulmonary nodules all of which are stable to larger in appearance including index nodules as above from prior comparison 01/08/2018 consistent with disease progression. 2. Trace left pleural effusion. 3. CT abdomen and pelvis without acute abnormality specifically no evidence for metastasis. 4. Hepatic steatosis.   D:  04/23/2018 E:  04/24/2018  This report was finalized on 4/24/2018 10:03 AM by  Dr. Kd San.      Xr Chest 1 View    Result Date: 4/26/2018  Narrative:  EXAMINATION: XR CHEST 1 VW-  INDICATION: s/p Portacath; C34.90-Malignant neoplasm of unspecified part of unspecified bronchus or lung  COMPARISON: 1/9/2017 chest radiograph, but more recent 4/23/2018 chest CT scan  FINDINGS: Mild interstitial changes the right upper lobe are similar to recent CT scan. The previously noted mass seen in January and smaller on recent CT scan is not directly visible. Likewise, left-sided pulmonary nodule is not readily appreciated on this exam. There are some coarse interstitial markings elsewhere, apparently chronic, present on previous chest x-ray. No no effusion lung consolidation or pneumothorax is seen. Left-sided central venous access catheter is now seen, tip in the mid SVC.         Impression: 1. Mild residual pulmonary interstitial changes similar to recent CT scan. Patient's known right and left upper lung nodules are not readily identified today. 2. Left-sided central venous port placement, tip in the mid SVC. No evidence of pneumothorax or other new chest disease.  This report was finalized on 4/26/2018 10:47 AM by DR. Thang Lion MD.      Fl C Arm During Surgery    Result Date: 4/26/2018  Narrative: EXAMINATION: FLUOROSCOPY C-ARM DURING SURGERY-  INDICATION: Port placement; C34.90-Malignant neoplasm of unspecified part of unspecified bronchus or lung.  TECHNIQUE: Intraoperative fluoroscopy for improved localization and treatment planning.  COMPARISON: None.  FINDINGS: Intraoperative fluoroscopy with total fluoroscopic time usage 12 seconds and no representative images saved during port placement.      Impression: Intraoperative fluoroscopy utilized for port placement.   D:  04/26/2018 E:  04/26/2018  This report was finalized on 4/26/2018 1:53 PM by Dr. Kd San.    (  No results found.    ASSESSMENT: The patient is a very pleasant 61 y.o. female with small cell lung cancer.     PROBLEM  LIST:  1. Presented with cough, shortness of breath, and pneumonia.   2. Right upper lobe mass with multiple scattered pulmonary nodules bilaterally.   3. Status post bronchoscopy with biopsy revealing small cell lung cancer.   4. PET scan done 2/2/2017 shows disease in the right upper lobe with no evidence of distant metastatic disease. MRI brain negative on 2/2/2017  5.  Started definitive chemotherapy with radiation using cisplatin and etoposide on February 7, 2017.  Status post 6 cycles completed 6/8/2017.  6.  Status post CyberKnife left upper lobe lung nodule completed on March 22, 2018  7.  Progressive disease doctor had CAT scan done April 23, 2018 with increasing size of right lung nodules   8.  Started on second line treatment using Opdivo/Yervoy on 5/1/2018. Status post #1 cycle of treatment.   9. History of cervical cancer status post hysterectomy.  10. History of tonsillar cancer status post chemo/radiation.   11. Hypothyroidism.  12. 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  Hypercholesterolemia  14. Hypertension.  15.  COPD  16. Anxiety and depression.  17. Immune therapy induced rash.   18. Cough    PLAN:  1. We will proceed today with cycle #2 of Opdivo/Yervoy as planned today.   2. We will monitor the patient's labs throughout treatment including blood counts, kidney function, and liver functions.   3.  We reviewed again potential side effects of immunotherapy including but not limited to immune mediated reactions with thyroiditis, pneumonitis, hepatitis, colitis, rash, and electrolytes abnormalities, fatigue, multiorgan failure, and possibly death.  4. The patient will continue Zofran to be used as needed for nausea.   5. The patient declined prophylactic whole brain radiation.    6.  She will continue Restoril 30 mg every bedtime as needed for insomnia.   7. She will continue gabapentin, 600 mg twice a day for chemotherapy induced neuropathy.   8.  We'll continue Norvasc for  hypertension  9.  We'll continue Synthroid for hypothyroid  10.  We'll continue Lipitor for hypercholesterolemia  11.  I will monitor patient blood work including blood counts, kidney function, thyroid function and liver enzymes.  12.  The patient would follow-up with me in 3 weeks for cycle #3.  13.  I'll repeat her scans after cycle #4.  14. I recommended the patient use a good topical moisturizer to her skin as well as topical hydrocortisone cream as needed for skin rash.  15. She is going to restart over the counter Claritin for both skin rash as well as seasonal allergy symptoms.   16. We will refill the patient's Ativan 1 mg to be taken every 8 hours as needed for anxiety.   17. I asked the patient to monitor her cough. If she has significantly worsened symptoms after this cycle of treatment, or associated shortness of air, I asked to her to call for consideration of pneumonitis related to immune therapy.     Kae Covarrubias, APRN  5/22/2018   9:31 AM

## 2018-05-23 NOTE — TELEPHONE ENCOUNTER
----- Message from Melvi Vasquez sent at 5/23/2018  9:40 AM EDT -----  Regarding: [facility] - RX REFILL  Contact: [phone]  PATIENT CALLED NEEDING GABAPENTIN 600 MG REFILLED

## 2018-06-07 NOTE — TELEPHONE ENCOUNTER
VM left advising patient to return call since we want to avoid steroids if at all possible while she is on opdivo/yervoy.

## 2018-06-07 NOTE — TELEPHONE ENCOUNTER
Patient states she was placed on prednisone 10 mg, and to take 40mg and taper by 10mg every week for sinus infection. Discussed with Dr Arvizu, and patient advised to decrease to 20mg today, and discuss further with Dr Tan in the morning when he is back in the office to determine the rest of the course of faster taper down.  Patient advised I will call her back in the morning once I speak with Dr Tan

## 2018-06-07 NOTE — TELEPHONE ENCOUNTER
----- Message from Erna Harding sent at 6/7/2018  8:26 AM EDT -----  Regarding: [facility]-MEDICATION  Contact: [phone]  Patient called she went to see PCP yesterday she still has a sinus infection along with an upper respiratory  infection and he gave her prednisone is that okay to take?

## 2018-06-08 NOTE — TELEPHONE ENCOUNTER
Patient advised that lipitor is okay to take while on immune therapy.  Advised per dr gamez to take 10mg of her prednisone today and tomorrow, then to stop taking it

## 2018-06-08 NOTE — TELEPHONE ENCOUNTER
----- Message from Melvi Vasquez sent at 6/8/2018 12:06 PM EDT -----  Regarding: [facility] - MEDICATION   Contact: [phone]  PATIENT NEEDS TO SPEAK TO JAYESH ABOUT THE PREDNISONE. JAYESH WAS GOING TO SPEAK TO DR GARCIA ABOUT IT.

## 2018-06-12 NOTE — PROGRESS NOTES
DATE OF VISIT: 6/12/2018    REASON FOR VISIT: Limited stage small cell lung cancer T2N0M0.       HISTORY OF PRESENT ILLNESS: The patient is a very pleasant 62 y.o. female  with past medical history significant for small cell lung cancer diagnosed 12/30/2016. The patient presented with pneumonia unresponsive to antibiotics. Chest x-ray was completed that revealed a right upper lobe lung mass, confirmed on CT angiogram with multiple pulmonary nodules and left lower lobe infiltrate. He underwent bronchoscopy with biopsies on 12/30/2016 with pathology revealing small kevin lung cancer. The patient had staging work up including PET scan and MRI brain that failed to show evidence of distant metastatic disease. The patient was started on definitive treatment with cisplatin/etoposide with radiation on 2/8/2017. The pateint completed cycle # 4 on 04/21/2017. The patient elected to proceed with two additional cycles of cisplatin/etoposide, and completed cycle #6 on 6/8/2017. She had evidence of disease progression documented on CAT scan completed 4/23/2018. She was started on second line therapy using Opdivo/Yervoy on 5/1/2018. She is here today for scheduled follow up visit with treatment.     SUBJECTIVE: The patient is here today with her daughter.  She was recently diagnosed with sinusitis infection, completed short course of oral steroids.  She is finishing up oral antibiotics.    PAST MEDICAL HISTORY/SOCIAL HISTORY/FAMILY HISTORY: Unchanged from my prior documentation done on 01/27/2017.    Review of Systems   Constitutional: Positive for fatigue. Negative for activity change, appetite change, chills, fever and unexpected weight change.   HENT: Negative for hearing loss, mouth sores, nosebleeds, sore throat and trouble swallowing.    Eyes: Negative for visual disturbance.   Respiratory: Positive for cough. Negative for chest tightness, shortness of breath and wheezing.    Cardiovascular: Negative for chest pain and  palpitations.   Gastrointestinal: Negative for abdominal distention, abdominal pain, blood in stool, constipation, diarrhea, rectal pain and vomiting.   Endocrine: Negative for cold intolerance and heat intolerance.   Genitourinary: Negative for difficulty urinating, dysuria, frequency and urgency.   Musculoskeletal: Positive for arthralgias. Negative for back pain, gait problem, joint swelling and myalgias.   Skin: Positive for rash.   Neurological: Positive for numbness and headaches. Negative for dizziness, tremors, syncope, weakness and light-headedness.   Hematological: Negative for adenopathy. Does not bruise/bleed easily.   Psychiatric/Behavioral: Positive for sleep disturbance. Negative for confusion and suicidal ideas. The patient is nervous/anxious.          Current Outpatient Prescriptions:   •  erythromycin (ERYTHROCIN) 250 MG tablet, Take  by mouth 2 (Two) Times a Day., Disp: , Rfl:   •  GuaiFENesin (COUGH SYRUP PO), Take  by mouth Every Night., Disp: , Rfl:   •  acetaminophen (TYLENOL) 500 MG tablet, Take 650 mg by mouth 3 (Three) Times a Day As Needed for mild pain (1-3)., Disp: , Rfl:   •  amLODIPine (NORVASC) 5 MG tablet, TAKE 1 TABLET BY MOUTH EVERY DAY, Disp: 30 tablet, Rfl: 0  •  atorvastatin (LIPITOR) 40 MG tablet, Take 40 mg by mouth Every Night., Disp: , Rfl:   •  BREO ELLIPTA 200-25 MCG/INH aerosol powder , INHALE 1 PUFF D, Disp: , Rfl: 2  •  citalopram (CeleXA) 40 MG tablet, Take 40 mg by mouth Daily., Disp: , Rfl: 0  •  gabapentin (NEURONTIN) 600 MG tablet, Take 1 tablet by mouth 2 (Two) Times a Day., Disp: 60 tablet, Rfl: 3  •  levothyroxine (SYNTHROID, LEVOTHROID) 88 MCG tablet, Take 88 mcg by mouth Daily., Disp: , Rfl:   •  lidocaine-prilocaine (EMLA) 2.5-2.5 % cream, Apply  topically As Needed (45-60 minutes prior to port access.  Cover with saran/plastic wrap.)., Disp: 30 g, Rfl: 3  •  LORazepam (ATIVAN) 1 MG tablet, Take 1 tablet by mouth Every 8 (Eight) Hours As Needed for Anxiety.  "for anxiety, Disp: 90 tablet, Rfl: 2  •  Naproxen Sodium (ALEVE PO), Take 1 tablet/day by mouth Daily As Needed., Disp: , Rfl:   •  omeprazole (priLOSEC) 40 MG capsule, Take 1 capsule by mouth Daily., Disp: 30 capsule, Rfl: 5  •  ondansetron (ZOFRAN) 8 MG tablet, Take 1 tablet by mouth 3 (Three) Times a Day As Needed for Nausea or Vomiting., Disp: 30 tablet, Rfl: 5  •  temazepam (RESTORIL) 30 MG capsule, Take 1 capsule by mouth At Night As Needed for Sleep., Disp: 30 capsule, Rfl: 2    PHYSICAL EXAMINATION:   /86 Comment: RUE  Pulse 86   Temp 97.5 °F (36.4 °C) (Temporal Artery )   Resp 18   Ht 165.1 cm (65\")   Wt 90.7 kg (200 lb)   BMI 33.28 kg/m²    ECOG Performance Status: 1 - Symptomatic but completely ambulatory  General Appearance:  alert, cooperative, no apparent distress and appears stated age   Neurologic/Psychiatric: A&O x 3, gait steady, appropriate affect, strength 5/5 in all muscle groups   HEENT:  Normocephalic, without obvious abnormality, mucous membranes moist   Neck: Supple, symmetrical, trachea midline, no adenopathy;  No thyromegaly, masses, or tenderness   Lungs:   Clear to auscultation bilaterally; respirations regular, even, and unlabored bilaterally   Heart:  Regular rate and rhythm, no murmurs appreciated   Abdomen:   Soft, non-tender, non-distended and no organomegaly   Lymph nodes: No cervical, supraclavicular, inguinal or axillary adenopathy noted   Extremities: Normal, atraumatic; no clubbing, cyanosis, or edema    Skin: No rashes, ulcers, or suspicious lesions noted     No visits with results within 2 Week(s) from this visit.   Latest known visit with results is:   Infusion on 05/22/2018   Component Date Value Ref Range Status   • Glucose 05/22/2018 112* 70 - 100 mg/dL Final   • BUN 05/22/2018 9  9 - 23 mg/dL Final   • Creatinine 05/22/2018 0.90  0.60 - 1.30 mg/dL Final   • Sodium 05/22/2018 140  132 - 146 mmol/L Final   • Potassium 05/22/2018 3.5  3.5 - 5.5 mmol/L Final   • " Chloride 05/22/2018 107  99 - 109 mmol/L Final   • CO2 05/22/2018 25.0  20.0 - 31.0 mmol/L Final   • Calcium 05/22/2018 8.7  8.7 - 10.4 mg/dL Final   • Total Protein 05/22/2018 6.9  5.7 - 8.2 g/dL Final   • Albumin 05/22/2018 4.20  3.20 - 4.80 g/dL Final   • ALT (SGPT) 05/22/2018 39  7 - 40 U/L Final   • AST (SGOT) 05/22/2018 29  0 - 33 U/L Final   • Alkaline Phosphatase 05/22/2018 130* 25 - 100 U/L Final   • Total Bilirubin 05/22/2018 0.5  0.3 - 1.2 mg/dL Final   • eGFR Non African Amer 05/22/2018 63  >60 mL/min/1.73 Final   • Globulin 05/22/2018 2.7  gm/dL Final   • A/G Ratio 05/22/2018 1.6  1.5 - 2.5 g/dL Final   • BUN/Creatinine Ratio 05/22/2018 10.0  7.0 - 25.0 Final   • Anion Gap 05/22/2018 8.0  3.0 - 11.0 mmol/L Final   • TSH 05/22/2018 2.794  0.350 - 5.350 mIU/mL Final   • Free T4 05/22/2018 1.68  0.89 - 1.76 ng/dL Final   • Cortisol - AM 05/22/2018 10.70  4.30 - 22.40 mcg/dL Final   • WBC 05/22/2018 8.10  3.50 - 10.80 10*3/mm3 Final   • RBC 05/22/2018 5.08  3.89 - 5.14 10*6/mm3 Final   • Hemoglobin 05/22/2018 14.4  11.5 - 15.5 g/dL Final   • Hematocrit 05/22/2018 45.0* 34.5 - 44.0 % Final   • RDW 05/22/2018 16.0* 11.3 - 14.5 % Final   • MCV 05/22/2018 88.6  80.0 - 99.0 fL Final   • MCH 05/22/2018 28.4  27.0 - 31.0 pg Final   • MCHC 05/22/2018 32.0  32.0 - 36.0 g/dL Final   • MPV 05/22/2018 7.9  6.0 - 12.0 fL Final   • Platelets 05/22/2018 230  150 - 450 10*3/mm3 Final   • Neutrophil % 05/22/2018 80.9* 41.0 - 71.0 % Final   • Lymphocyte % 05/22/2018 15.2* 24.0 - 44.0 % Final   • Monocyte % 05/22/2018 3.9  0.0 - 12.0 % Final   • Neutrophils, Absolute 05/22/2018 6.60  1.50 - 8.30 10*3/mm3 Final   • Lymphocytes, Absolute 05/22/2018 1.20  0.60 - 4.80 10*3/mm3 Final   • Monocytes, Absolute 05/22/2018 0.30  0.00 - 1.00 10*3/mm3 Final   • Creatinine 05/22/2018 0.90  0.60 - 1.30 mg/dL Final    Serial Number: [id number]:  [id number]      No results found.(  No results found.    ASSESSMENT: The patient is a very  pleasant 61 y.o. female with small cell lung cancer.     PROBLEM LIST:  1. Presented with cough, shortness of breath, and pneumonia.   2. Right upper lobe mass with multiple scattered pulmonary nodules bilaterally.   3. Status post bronchoscopy with biopsy revealing small cell lung cancer.   4. PET scan done 2/2/2017 shows disease in the right upper lobe with no evidence of distant metastatic disease. MRI brain negative on 2/2/2017  5.  Started definitive chemotherapy with radiation using cisplatin and etoposide on February 7, 2017.  Status post 6 cycles completed 6/8/2017.  6.  Status post CyberKnife left upper lobe lung nodule completed on March 22, 2018  7.  Progressive disease doctor had CAT scan done April 23, 2018 with increasing size of right lung nodules   8.  Started on second line treatment using Opdivo/Yervoy on 5/1/2018. Status post 2 cycles   9. History of cervical cancer status post hysterectomy.  10. History of tonsillar cancer status post chemo/radiation.   11. Hypothyroidism.  12. 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  Hypercholesterolemia  14. Hypertension.  15.  COPD  16. Anxiety and depression.  17. Immune therapy induced rash.       PLAN:  1. We will proceed today with cycle #3 of Opdivo/Yervoy as planned today.   2. We will monitor the patient's labs throughout treatment including blood counts, kidney function, and liver functions.   3.  We reviewed again potential side effects of immunotherapy including but not limited to immune mediated reactions with thyroiditis, pneumonitis, hepatitis, colitis, rash, and electrolytes abnormalities, fatigue, multiorgan failure, and possibly death.  4. The patient will continue Zofran to be used as needed for nausea.   5. The patient declined prophylactic whole brain radiation.    6.  She will continue Restoril 30 mg every bedtime as needed for insomnia.   7. She will continue gabapentin, 600 mg twice a day for chemotherapy induced neuropathy.   8.   We'll continue Norvasc for hypertension.  We'll consider adjusting the dose if she started to become hypotensive.  9.  We'll continue Synthroid for hypothyroid  10.  We'll continue Lipitor for hypercholesterolemia, we'll continue to monitor her liver enzymes.  11.  I will monitor patient blood work including blood counts, kidney function, thyroid function and liver enzymes.  12.  The patient would follow-up with me in 3 weeks for cycle #4.  13.  I'll repeat her scans after cycle #4.  14. I recommended the patient use a good topical moisturizer to her skin as well as topical hydrocortisone cream as needed for skin rash.  15.  We will refill the patient's Ativan 1 mg to be taken every 8 hours as needed for anxiety.     Zay Tan M.D.  6/12/2018   10:00 AM

## 2018-07-03 NOTE — PROGRESS NOTES
DATE OF VISIT: 7/3/2018    REASON FOR VISIT: Limited stage small cell lung cancer T2N0M0.       HISTORY OF PRESENT ILLNESS: The patient is a very pleasant 62 y.o. female  with past medical history significant for small cell lung cancer diagnosed 12/30/2016. The patient presented with pneumonia unresponsive to antibiotics. Chest x-ray was completed that revealed a right upper lobe lung mass, confirmed on CT angiogram with multiple pulmonary nodules and left lower lobe infiltrate. He underwent bronchoscopy with biopsies on 12/30/2016 with pathology revealing small kevin lung cancer. The patient had staging work up including PET scan and MRI brain that failed to show evidence of distant metastatic disease. The patient was started on definitive treatment with cisplatin/etoposide with radiation on 2/8/2017. The pateint completed cycle # 4 on 04/21/2017. The patient elected to proceed with two additional cycles of cisplatin/etoposide, and completed cycle #6 on 6/8/2017. She had evidence of disease progression documented on CAT scan completed 4/23/2018. She was started on second line therapy using Opdivo/Yervoy on 5/1/2018. She is here today for scheduled follow up visit with treatment.     SUBJECTIVE: The patient is here today with her daughter.  She is complaining of neuropathy, gabapentin 6 little bit.  She is requesting refill on her sleep medicine.  She's been having anxiety that improves with Ativan, she is requesting a refill.    PAST MEDICAL HISTORY/SOCIAL HISTORY/FAMILY HISTORY: Unchanged from my prior documentation done on 01/27/2017.    Review of Systems   Constitutional: Positive for fatigue. Negative for activity change, appetite change, chills, fever and unexpected weight change.   HENT: Negative for hearing loss, mouth sores, nosebleeds, sore throat and trouble swallowing.    Eyes: Negative for visual disturbance.   Respiratory: Positive for cough. Negative for chest tightness, shortness of breath and  wheezing.    Cardiovascular: Negative for chest pain and palpitations.   Gastrointestinal: Negative for abdominal distention, abdominal pain, blood in stool, constipation, diarrhea, rectal pain and vomiting.   Endocrine: Negative for cold intolerance and heat intolerance.   Genitourinary: Negative for difficulty urinating, dysuria, frequency and urgency.   Musculoskeletal: Positive for arthralgias. Negative for back pain, gait problem, joint swelling and myalgias.   Skin: Positive for rash.   Neurological: Positive for numbness and headaches. Negative for dizziness, tremors, syncope, weakness and light-headedness.   Hematological: Negative for adenopathy. Does not bruise/bleed easily.   Psychiatric/Behavioral: Positive for sleep disturbance. Negative for confusion and suicidal ideas. The patient is nervous/anxious.          Current Outpatient Prescriptions:   •  acetaminophen (TYLENOL) 500 MG tablet, Take 650 mg by mouth 3 (Three) Times a Day As Needed for mild pain (1-3)., Disp: , Rfl:   •  amLODIPine (NORVASC) 5 MG tablet, TAKE 1 TABLET BY MOUTH EVERY DAY, Disp: 30 tablet, Rfl: 0  •  atorvastatin (LIPITOR) 40 MG tablet, Take 40 mg by mouth Every Night., Disp: , Rfl:   •  BREO ELLIPTA 200-25 MCG/INH aerosol powder , INHALE 1 PUFF D, Disp: , Rfl: 2  •  citalopram (CeleXA) 40 MG tablet, Take 40 mg by mouth Daily., Disp: , Rfl: 0  •  erythromycin (ERYTHROCIN) 250 MG tablet, Take  by mouth 2 (Two) Times a Day., Disp: , Rfl:   •  gabapentin (NEURONTIN) 600 MG tablet, Take 1 tablet by mouth 3 (Three) Times a Day., Disp: 60 tablet, Rfl: 3  •  GuaiFENesin (COUGH SYRUP PO), Take  by mouth Every Night., Disp: , Rfl:   •  levothyroxine (SYNTHROID, LEVOTHROID) 88 MCG tablet, Take 88 mcg by mouth Daily., Disp: , Rfl:   •  lidocaine-prilocaine (EMLA) 2.5-2.5 % cream, Apply  topically As Needed (45-60 minutes prior to port access.  Cover with saran/plastic wrap.)., Disp: 30 g, Rfl: 3  •  LORazepam (ATIVAN) 1 MG tablet, Take 1  "tablet by mouth Every 8 (Eight) Hours As Needed for Anxiety. for anxiety, Disp: 90 tablet, Rfl: 2  •  Naproxen Sodium (ALEVE PO), Take 1 tablet/day by mouth Daily As Needed., Disp: , Rfl:   •  omeprazole (priLOSEC) 40 MG capsule, Take 1 capsule by mouth Daily., Disp: 30 capsule, Rfl: 5  •  ondansetron (ZOFRAN) 8 MG tablet, Take 1 tablet by mouth 3 (Three) Times a Day As Needed for Nausea or Vomiting., Disp: 30 tablet, Rfl: 5  •  temazepam (RESTORIL) 30 MG capsule, Take 1 capsule by mouth At Night As Needed for Sleep., Disp: 30 capsule, Rfl: 2    PHYSICAL EXAMINATION:   /88 Comment: LUE  Pulse 82   Temp 98.1 °F (36.7 °C) (Temporal Artery )   Resp 20   Ht 165.1 cm (65\")   Wt 91.2 kg (201 lb)   BMI 33.45 kg/m²    ECOG Performance Status: 1 - Symptomatic but completely ambulatory  General Appearance:  alert, cooperative, no apparent distress and appears stated age   Neurologic/Psychiatric: A&O x 3, gait steady, appropriate affect, strength 5/5 in all muscle groups   HEENT:  Normocephalic, without obvious abnormality, mucous membranes moist   Neck: Supple, symmetrical, trachea midline, no adenopathy;  No thyromegaly, masses, or tenderness   Lungs:   Clear to auscultation bilaterally; respirations regular, even, and unlabored bilaterally   Heart:  Regular rate and rhythm, no murmurs appreciated   Abdomen:   Soft, non-tender, non-distended and no organomegaly   Lymph nodes: No cervical, supraclavicular, inguinal or axillary adenopathy noted   Extremities: Normal, atraumatic; no clubbing, cyanosis, or edema    Skin: No rashes, ulcers, or suspicious lesions noted     No visits with results within 2 Week(s) from this visit.   Latest known visit with results is:   Infusion on 06/12/2018   Component Date Value Ref Range Status   • Glucose 06/12/2018 102* 70 - 100 mg/dL Final   • BUN 06/12/2018 18  9 - 23 mg/dL Final   • Creatinine 06/12/2018 0.88  0.60 - 1.30 mg/dL Final   • Sodium 06/12/2018 140  132 - 146 mmol/L " Final   • Potassium 06/12/2018 4.1  3.5 - 5.5 mmol/L Final   • Chloride 06/12/2018 104  99 - 109 mmol/L Final   • CO2 06/12/2018 28.0  20.0 - 31.0 mmol/L Final   • Calcium 06/12/2018 9.3  8.7 - 10.4 mg/dL Final   • Total Protein 06/12/2018 6.8  5.7 - 8.2 g/dL Final   • Albumin 06/12/2018 4.08  3.20 - 4.80 g/dL Final   • ALT (SGPT) 06/12/2018 61* 7 - 40 U/L Final   • AST (SGOT) 06/12/2018 36* 0 - 33 U/L Final   • Alkaline Phosphatase 06/12/2018 136* 25 - 100 U/L Final   • Total Bilirubin 06/12/2018 0.5  0.3 - 1.2 mg/dL Final   • eGFR Non African Amer 06/12/2018 65  >60 mL/min/1.73 Final   • Globulin 06/12/2018 2.7  gm/dL Final   • A/G Ratio 06/12/2018 1.5  1.5 - 2.5 g/dL Final   • BUN/Creatinine Ratio 06/12/2018 20.5  7.0 - 25.0 Final   • Anion Gap 06/12/2018 8.0  3.0 - 11.0 mmol/L Final   • TSH 06/12/2018 2.211  0.350 - 5.350 mIU/mL Final   • Free T4 06/12/2018 1.46  0.89 - 1.76 ng/dL Final   • Cortisol 06/12/2018 11.50  mcg/dL Final   • WBC 06/12/2018 6.80  3.50 - 10.80 10*3/mm3 Final   • RBC 06/12/2018 5.11  3.89 - 5.14 10*6/mm3 Final   • Hemoglobin 06/12/2018 14.1  11.5 - 15.5 g/dL Final   • Hematocrit 06/12/2018 45.3* 34.5 - 44.0 % Final   • RDW 06/12/2018 16.3* 11.3 - 14.5 % Final   • MCV 06/12/2018 88.7  80.0 - 99.0 fL Final   • MCH 06/12/2018 27.6  27.0 - 31.0 pg Final   • MCHC 06/12/2018 31.1* 32.0 - 36.0 g/dL Final   • MPV 06/12/2018 7.5  6.0 - 12.0 fL Final   • Platelets 06/12/2018 239  150 - 450 10*3/mm3 Final   • Neutrophil % 06/12/2018 64.0  41.0 - 71.0 % Final   • Lymphocyte % 06/12/2018 23.4* 24.0 - 44.0 % Final   • Monocyte % 06/12/2018 12.6* 0.0 - 12.0 % Final   • Neutrophils, Absolute 06/12/2018 4.40  1.50 - 8.30 10*3/mm3 Final   • Lymphocytes, Absolute 06/12/2018 1.60  0.60 - 4.80 10*3/mm3 Final   • Monocytes, Absolute 06/12/2018 0.90  0.00 - 1.00 10*3/mm3 Final   • Creatinine 06/12/2018 0.90  0.60 - 1.30 mg/dL Final    Serial Number: [id number]:  [id number]      No results found.(  No results  found.    ASSESSMENT: The patient is a very pleasant 61 y.o. female with small cell lung cancer.     PROBLEM LIST:  1. Presented with cough, shortness of breath, and pneumonia.   2. Right upper lobe mass with multiple scattered pulmonary nodules bilaterally.   3. Status post bronchoscopy with biopsy revealing small cell lung cancer.   4. PET scan done 2/2/2017 shows disease in the right upper lobe with no evidence of distant metastatic disease. MRI brain negative on 2/2/2017  5.  Started definitive chemotherapy with radiation using cisplatin and etoposide on February 7, 2017.  Status post 6 cycles completed 6/8/2017.  6.  Status post CyberKnife left upper lobe lung nodule completed on March 22, 2018  7.  Progressive disease doctor had CAT scan done April 23, 2018 with increasing size of right lung nodules   8.  Started on second line treatment using Opdivo/Yervoy on 5/1/2018. Status post 3 cycles   9. History of cervical cancer status post hysterectomy.  10. History of tonsillar cancer status post chemo/radiation.   11. Hypothyroidism.  12. 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  Hypercholesterolemia  14. Hypertension.  15.  COPD  16. Anxiety and depression.  17. Immune therapy induced rash.       PLAN:  1. We will proceed today with cycle #4 of Opdivo/Yervoy as planned today.   2. We will monitor the patient's labs throughout treatment including blood counts, kidney function, and liver functions.   3.  We reviewed again potential side effects of immunotherapy including but not limited to immune mediated reactions with thyroiditis, pneumonitis, hepatitis, colitis, rash, and electrolytes abnormalities, fatigue, multiorgan failure, and possibly death.  4. The patient will continue Zofran to be used as needed for nausea.   5. The patient declined prophylactic whole brain radiation.    6.  She will continue Restoril 30 mg every bedtime as needed for insomnia.  I will refill it today.  7. She will continue  gabapentin, 600 mg, I will increase the dose to 3 times a day for chemotherapy induced neuropathy.   8.  We'll continue Norvasc for hypertension.  We'll consider adjusting the dose if she started to become hypotensive.  9.  We'll continue Synthroid for hypothyroid  10.  We'll continue Lipitor for hypercholesterolemia, we'll continue to monitor her liver enzymes.  11.  I will monitor patient blood work including blood counts, kidney function, thyroid function and liver enzymes.  12.  The patient would follow-up with me in 4 weeks.  13.  I'll repeat her scans after cycle #4.  This will be ordered prior to return.  Consider Opdivo maintenance if her scans showed improvement.  14. I recommended the patient use a good topical moisturizer to her skin as well as topical hydrocortisone cream as needed for skin rash.  15.  We will refill the patient's Ativan 1 mg to be taken every 8 hours as needed for anxiety.     Zay Tan M.D.  7/3/2018   10:49 AM

## 2018-07-31 NOTE — PROGRESS NOTES
DATE OF VISIT: 7/31/2018    REASON FOR VISIT: Followup for limited stage small cell lung cancer [id number].     HISTORY OF PRESENT ILLNESS: The patient is a very pleasant 62 y.o. female with past medical history significant for small cell lung cancer diagnosed 12/30/2016. The patient presented with pneumonia unresponsive to antibiotics. Chest x-ray was completed that revealed a right upper lobe lung mass, confirmed on CT angiogram with multiple pulmonary nodules and left lower lobe infiltrate. He underwent bronchoscopy with biopsies on 12/30/2016 with pathology revealing small kevin lung cancer. The patient had staging work up including PET scan and MRI brain that failed to show evidence of distant metastatic disease. The patient was started on definitive treatment with cisplatin/etoposide with radiation on 2/8/2017. The pateint completed cycle # 4 on 04/21/2017. The patient elected to proceed with two additional cycles of cisplatin/etoposide, and completed cycle #6 on 6/8/2017. She had evidence of disease progression documented on CAT scan completed 4/23/2018. She was started on second line therapy using Opdivo/Yervoy on 5/1/2018.  Completed 4 cycles July 17, 2018.  Started Opdivo single agent July 31, 2018.  She is here today for scheduled follow up visit with treatment cycle #1.     SUBJECTIVE: The patient is here today with her daughter. She complains of left hip pain and neuropathy in her fingers and both feet. She also complains of neck pain and headaches.She denies any dizziness or nausea.    PAST MEDICAL HISTORY/SOCIAL HISTORY/FAMILY HISTORY: Reviewed by me and unchanged from my documentation done on 07/03/18.    Review of Systems   Constitutional: Negative for activity change, appetite change, chills, fatigue, fever and unexpected weight change.   HENT: Negative for hearing loss, mouth sores, nosebleeds, sore throat and trouble swallowing.    Eyes: Negative for visual disturbance.   Respiratory: Negative for  cough, chest tightness, shortness of breath and wheezing.    Cardiovascular: Negative for chest pain, palpitations and leg swelling.   Gastrointestinal: Negative for abdominal distention, abdominal pain, blood in stool, constipation, diarrhea, nausea, rectal pain and vomiting.   Endocrine: Negative for cold intolerance and heat intolerance.   Genitourinary: Negative for difficulty urinating, dysuria, frequency and urgency.   Musculoskeletal: Positive for back pain and neck pain. Negative for arthralgias, gait problem, joint swelling and myalgias.   Skin: Negative for rash.   Neurological: Positive for headaches. Negative for dizziness, tremors, syncope, weakness, light-headedness and numbness.   Hematological: Negative for adenopathy. Does not bruise/bleed easily.   Psychiatric/Behavioral: Negative for confusion, sleep disturbance and suicidal ideas. The patient is not nervous/anxious.    All other systems reviewed and are negative.        Current Outpatient Prescriptions:   •  acetaminophen (TYLENOL) 500 MG tablet, Take 650 mg by mouth 3 (Three) Times a Day As Needed for mild pain (1-3)., Disp: , Rfl:   •  amLODIPine (NORVASC) 5 MG tablet, TAKE 1 TABLET BY MOUTH EVERY DAY, Disp: 30 tablet, Rfl: 0  •  atorvastatin (LIPITOR) 40 MG tablet, Take 40 mg by mouth Every Night., Disp: , Rfl:   •  BREO ELLIPTA 200-25 MCG/INH aerosol powder , INHALE 1 PUFF D, Disp: , Rfl: 2  •  citalopram (CeleXA) 40 MG tablet, Take 40 mg by mouth Daily., Disp: , Rfl: 0  •  gabapentin (NEURONTIN) 600 MG tablet, Take 1 tablet by mouth 3 (Three) Times a Day., Disp: 60 tablet, Rfl: 3  •  GuaiFENesin (COUGH SYRUP PO), Take  by mouth Every Night., Disp: , Rfl:   •  levothyroxine (SYNTHROID, LEVOTHROID) 88 MCG tablet, Take 88 mcg by mouth Daily., Disp: , Rfl:   •  lidocaine-prilocaine (EMLA) 2.5-2.5 % cream, Apply  topically As Needed (45-60 minutes prior to port access.  Cover with saran/plastic wrap.)., Disp: 30 g, Rfl: 3  •  LORazepam (ATIVAN) 1  "MG tablet, Take 1 tablet by mouth Every 8 (Eight) Hours As Needed for Anxiety. for anxiety, Disp: 90 tablet, Rfl: 2  •  Naproxen Sodium (ALEVE PO), Take 1 tablet/day by mouth Daily As Needed., Disp: , Rfl:   •  omeprazole (priLOSEC) 40 MG capsule, Take 1 capsule by mouth Daily., Disp: 30 capsule, Rfl: 5  •  ondansetron (ZOFRAN) 8 MG tablet, Take 1 tablet by mouth 3 (Three) Times a Day As Needed for Nausea or Vomiting., Disp: 30 tablet, Rfl: 5  •  temazepam (RESTORIL) 30 MG capsule, Take 1 capsule by mouth At Night As Needed for Sleep., Disp: 30 capsule, Rfl: 2  •  erythromycin (ERYTHROCIN) 250 MG tablet, Take  by mouth 2 (Two) Times a Day., Disp: , Rfl:   No current facility-administered medications for this visit.     PHYSICAL EXAMINATION:   /85   Pulse 82   Temp 98.2 °F (36.8 °C) (Temporal Artery )   Resp 16   Ht 165.1 cm (65\")   Wt 90.3 kg (199 lb 1.6 oz)   SpO2 95%   BMI 33.13 kg/m²    ECOG Performance Status: 1 - Symptomatic but completely ambulatory  General Appearance:  alert, cooperative, no apparent distress and appears stated age   Neurologic/Psychiatric: A&O x 3, gait steady, appropriate affect, strength 5/5 in all muscle groups   HEENT:  Normocephalic, without obvious abnormality, mucous membranes moist   Neck: Supple, symmetrical, trachea midline, no adenopathy;  No thyromegaly, masses, or tenderness   Lungs:   Clear to auscultation bilaterally; respirations regular, even, and unlabored bilaterally   Heart:  Regular rate and rhythm, no murmurs appreciated   Abdomen:   Soft, non-tender, non-distended and no organomegaly   Lymph nodes: No cervical, supraclavicular, inguinal or axillary adenopathy noted   Extremities: Normal, atraumatic; no clubbing, cyanosis, or edema    Skin: No rashes, ulcers, or suspicious lesions noted     Lab on 07/31/2018   Component Date Value Ref Range Status   • WBC 07/31/2018 6.11  3.50 - 10.80 10*3/mm3 Final   • RBC 07/31/2018 5.27* 3.89 - 5.14 10*6/mm3 Final   • " Hemoglobin 07/31/2018 15.1  11.5 - 15.5 g/dL Final   • Hematocrit 07/31/2018 46.1* 34.5 - 44.0 % Final   • MCV 07/31/2018 87.5  80.0 - 99.0 fL Final   • MCH 07/31/2018 28.7  27.0 - 31.0 pg Final   • MCHC 07/31/2018 32.8  32.0 - 36.0 g/dL Final   • RDW 07/31/2018 15.1* 11.3 - 14.5 % Final   • RDW-SD 07/31/2018 48.1  37.0 - 54.0 fl Final   • MPV 07/31/2018 11.3  6.0 - 12.0 fL Final   • Platelets 07/31/2018 169  150 - 450 10*3/mm3 Final   • Neutrophil % 07/31/2018 55.8  41.0 - 71.0 % Final   • Lymphocyte % 07/31/2018 23.1* 24.0 - 44.0 % Final   • Monocyte % 07/31/2018 17.0* 0.0 - 12.0 % Final   • Eosinophil % 07/31/2018 3.6* 0.0 - 3.0 % Final   • Basophil % 07/31/2018 0.5  0.0 - 1.0 % Final   • Immature Grans % 07/31/2018 0.3  0.0 - 0.6 % Final   • Neutrophils, Absolute 07/31/2018 3.41  1.50 - 8.30 10*3/mm3 Final   • Lymphocytes, Absolute 07/31/2018 1.41  0.60 - 4.80 10*3/mm3 Final   • Monocytes, Absolute 07/31/2018 1.04* 0.00 - 1.00 10*3/mm3 Final   • Eosinophils, Absolute 07/31/2018 0.22  0.00 - 0.30 10*3/mm3 Final   • Basophils, Absolute 07/31/2018 0.03  0.00 - 0.20 10*3/mm3 Final   • Immature Grans, Absolute 07/31/2018 0.02  0.00 - 0.03 10*3/mm3 Final        No results found.(    ASSESSMENT: The patient is a very pleasant 62 y.o. female  with small cell lung cancer.     PROBLEM LIST:  1. Presented with cough, shortness of breath, and pneumonia.   2. Right upper lobe mass with multiple scattered pulmonary nodules bilaterally.   3. Status post bronchoscopy with biopsy revealing small cell lung cancer.   4. PET scan done 2/2/2017 shows disease in the right upper lobe with no evidence of distant metastatic disease. MRI brain negative on 2/2/2017  5.  Started definitive chemotherapy with radiation using cisplatin and etoposide on February 7, 2017.  Status post 6 cycles completed 6/8/2017.  6.  Status post CyberKnife left upper lobe lung nodule completed on March 22, 2018  7.  Progressive disease doctor had CAT scan done  April 23, 2018 with increasing size of right lung nodules   8. Started on second line treatment using Opdivo/Yervoy on 5/1/2018. Status post 4 cycles   9.  Started Opdivo for 80 mg IV every 4 weeks July 31, 2018   10. History of tonsillar cancer status post chemo/radiation.   11.History of cervical cancer status post hysterectomy.  12. 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  Hypercholesterolemia  14. Hypertension.  15.  COPD  16. Anxiety and depression.  17. Immune therapy induced rash.   18.  Hypothyroidism.    PLAN:  1. We will proceed today with cycle #1 of Opdivo as planned today.   2. We will monitor the patient's labs throughout treatment including blood counts, kidney function, and liver functions.   3.  We reviewed again potential side effects of immunotherapy including but not limited to immune mediated reactions with thyroiditis, pneumonitis, hepatitis, colitis, rash, and electrolytes abnormalities, fatigue, multiorgan failure, and possibly death.  4. The patient will continue Zofran to be used as needed for nausea.   5. The patient declined prophylactic whole brain radiation.    6.  She will continue Restoril 30 mg every bedtime as needed for insomnia.  I will refill it today.  7. She will continue gabapentin, 600 mg, I will increase the dose to 3 times a day for chemotherapy induced neuropathy.   8.  We'll continue Norvasc for hypertension.  We'll consider adjusting the dose if she started to become hypotensive.  9.  We'll continue Synthroid for hypothyroid  10.  We'll continue Lipitor for hypercholesterolemia, we'll continue to monitor her liver enzymes.  11.  I will monitor patient blood work including blood counts, kidney function, thyroid function and liver enzymes.  12.  The patient will follow-up with me in 4 weeks.  13.  I did go over the scan results with the patient and her daughter, I reviewed the films myself, I went over the pictures with them will compare the current CAT scan to  previous study done April 2018.  The patient has new lesions in both lungs as well as some of the lesions had diminished in size.  There is one suspicious area in the right hepatic lobe.  I would follow-up on the final radiology read.   I'll repeat her scans before cycle #4.    14. I recommended the patient use a good topical moisturizer to her skin as well as topical hydrocortisone cream as needed for skin rash.  15.  I will continue Ativan 1 mg to be taken every 8 hours as needed for anxiety.       Zay Tan MD  7/31/2018

## 2018-09-12 NOTE — PROGRESS NOTES
"[facility] Pulmonary Follow up    CHIEF COMPLAINT    Cough, fever, chills    HISTORY OF PRESENT ILLNESS    Leonarda Benitez is a 62 y.o.female with limited stage small cell lung cancer here today for a worsening cough with chills and fever.     She was diagnosed with lung cancer in December 2016 and has completed treatment with cisplatin/etoposide with radiation.  She continues on second line therapy with Opdivo.  For the last week or so she has had a worsening cough with chills and fever (MAXIMUM TEMPERATURE 101).  Her oncologist was concerned that she might be developing a respiratory infection.  She denies chest pain but has had some back pain.        Patient Active Problem List   Diagnosis   • Shortness of breath   • Leukocytosis   • Hyponatremia   • Hypokalemia   • Arthritis   • COPD (chronic obstructive pulmonary disease) (CMS/HCC)   • Coronary artery disease   • Hypothyroidism   • Hyperlipidemia   • Hypertension   • Former smoker, stopped smoking many years ago - quit smoking 12/28/2009   • Left lower lobe pneumonia (poa)   • Mass of upper lobe of right lung   • GERD (gastroesophageal reflux disease)   • Insomnia   • Rib pain on left side   • History of cancer tonsil (2009) - treated with radiation   • Sepsis (CMS/HCC)   • Hypoxia   • Mucopurulent chronic bronchitis (CMS/HCC)   • Small cell carcinoma of right lung (CMS/HCC)   • Malignant neoplasm of overlapping sites of lung (CMS/HCC)       Allergies   Allergen Reactions   • Ciprofloxacin Myalgia     Affects muscles   • Eggs Or Egg-Derived Products Other (See Comments)     PATIENT STATES \"HISTORY OF ALLERGY TESTING TOLD SHE HAD ALLERGY TO EGGS\"   • Penicillins Hives   • Aspirin Itching   • Codeine Itching   • Hydrocodone Itching   • Sulfa Antibiotics Other (See Comments)     Upsets stomach       Current Outpatient Prescriptions:   •  acetaminophen (TYLENOL) 500 MG tablet, Take 650 mg by mouth 3 (Three) Times a Day As Needed for mild pain (1-3)., Disp: , Rfl:   • "  amLODIPine (NORVASC) 5 MG tablet, TAKE 1 TABLET BY MOUTH EVERY DAY, Disp: 30 tablet, Rfl: 0  •  atorvastatin (LIPITOR) 40 MG tablet, Take 40 mg by mouth Every Night., Disp: , Rfl:   •  BREO ELLIPTA 200-25 MCG/INH aerosol powder , INHALE 1 PUFF D, Disp: , Rfl: 2  •  citalopram (CeleXA) 40 MG tablet, Take 40 mg by mouth Daily., Disp: , Rfl: 0  •  gabapentin (NEURONTIN) 600 MG tablet, Take 1 tablet by mouth 3 (Three) Times a Day., Disp: 60 tablet, Rfl: 3  •  levothyroxine (SYNTHROID, LEVOTHROID) 88 MCG tablet, Take 88 mcg by mouth Daily., Disp: , Rfl:   •  lidocaine-prilocaine (EMLA) 2.5-2.5 % cream, Apply  topically As Needed (45-60 minutes prior to port access.  Cover with saran/plastic wrap.)., Disp: 30 g, Rfl: 3  •  LORazepam (ATIVAN) 1 MG tablet, Take 1 tablet by mouth Every 8 (Eight) Hours As Needed for Anxiety. for anxiety, Disp: 90 tablet, Rfl: 2  •  Naproxen Sodium (ALEVE PO), Take 1 tablet/day by mouth Daily As Needed., Disp: , Rfl:   •  omeprazole (priLOSEC) 40 MG capsule, Take 1 capsule by mouth Daily., Disp: 30 capsule, Rfl: 5  •  ondansetron (ZOFRAN) 8 MG tablet, Take 1 tablet by mouth 3 (Three) Times a Day As Needed for Nausea or Vomiting., Disp: 30 tablet, Rfl: 5  •  pilocarpine (SALAGEN) 5 MG tablet, TK 1 T PO 3-4 TIMES D, Disp: , Rfl: 2  •  Polyethylene Glycol 3350 (MIRALAX PO), Take  by mouth., Disp: , Rfl:   •  sennosides-docusate sodium (SENOKOT-S) 8.6-50 MG tablet, Take 1 tablet by mouth Daily., Disp: , Rfl:   •  temazepam (RESTORIL) 30 MG capsule, Take 1 capsule by mouth At Night As Needed for Sleep., Disp: 30 capsule, Rfl: 2  •  tiZANidine (ZANAFLEX) 2 MG tablet, TK 1 T PO TID, Disp: , Rfl: 0  •  zolpidem (AMBIEN) 10 MG tablet, TK 1 T PO QD HS, Disp: , Rfl: 2  •  doxycycline (VIBRAMYCIN) 100 MG capsule, Take 1 capsule by mouth 2 (Two) Times a Day., Disp: 20 capsule, Rfl: 0  •  promethazine-dextromethorphan (PROMETHAZINE-DM) 6.25-15 MG/5ML syrup, Take 5 mL by mouth 4 (Four) Times a Day As Needed  "for Cough., Disp: 180 mL, Rfl: 0  MEDICATION LIST AND ALLERGIES REVIEWED.    Social History   Substance Use Topics   • Smoking status: Former Smoker     Packs/day: 0.50     Years: 20.00     Types: Cigarettes     Quit date: 12/28/2009   • Smokeless tobacco: Never Used   • Alcohol use Yes      Comment: 1 DRINK PER 2 WEEKS       FAMILY AND SOCIAL HISTORY REVIEWED.    Review of Systems   Constitutional: Positive for chills, fatigue and fever.   HENT: Positive for congestion, sneezing and sore throat.    Eyes: Negative.    Respiratory: Positive for cough, shortness of breath and wheezing.    Cardiovascular: Negative.    Gastrointestinal: Positive for constipation.   Genitourinary: Negative.    Musculoskeletal: Positive for back pain.   Skin: Negative.    Neurological: Negative.    Hematological: Negative.    Psychiatric/Behavioral: The patient is nervous/anxious.    .    /99 (BP Location: Right arm, Patient Position: Sitting, Cuff Size: Adult)   Pulse 79   Temp 97.3 °F (36.3 °C)   Resp 18   Ht 165.1 cm (65\")   Wt 90.3 kg (199 lb)   SpO2 94% Comment: RA AT REST  BMI 33.12 kg/m²       There is no immunization history on file for this patient.    Physical Exam   Constitutional: She is oriented to person, place, and time. She appears well-developed. No distress.   HENT:   Head: Normocephalic and atraumatic.   Neck: Neck supple.   Cardiovascular: Normal rate and regular rhythm.    No murmur heard.  Pulmonary/Chest: Effort normal. No stridor. No respiratory distress. She has no wheezes.   Scattered rhonchi   Musculoskeletal: Normal range of motion. She exhibits no edema.   Lymphadenopathy:     She has no cervical adenopathy.   Neurological: She is alert and oriented to person, place, and time.   Skin: Skin is warm and dry.   Psychiatric: She has a normal mood and affect. Her behavior is normal.   Vitals reviewed.        RESULTS    PFTS in the office today, read by me: No obstruction, mild restriction, DLCO reduced " but partially corrects for alveolar volume.    PROBLEM LIST    Problem List Items Addressed This Visit        Respiratory    Shortness of breath    Relevant Orders    Pulmonary Function Test (Completed)    Mucopurulent chronic bronchitis (CMS/HCC) - Primary    Relevant Medications    promethazine-dextromethorphan (PROMETHAZINE-DM) 6.25-15 MG/5ML syrup    Small cell carcinoma of right lung (CMS/HCC)    Relevant Medications    promethazine-dextromethorphan (PROMETHAZINE-DM) 6.25-15 MG/5ML syrup            DISCUSSION    This is a pleasant 62-year-old female with small cell carcinoma of the right lung currently on immunotherapy here today with worsening cough, congestion, fever and chills present for approximately one week.    Will go ahead and treat her for acute on chronic bronchitis with a 10 day course of doxycycline.  She also requests a cough syrup.  Promethazine DM syrup prescribed.  I discussed with her possible side effects.    I have instructed her to contact me if her symptoms fail to improve.  Otherwise we will just see her back as needed.    I spent 15 minutes with the patient and family. I spent > 50% percent of this time counseling and discussing diagnosis, current status, treatment options and management.    Eduarda Benites, APRN  09/12/20183:15 PM  Electronically signed     Please note that portions of this note were completed with a voice recognition program. Efforts were made to edit the dictations, but occasionally words are mistranscribed.      CC: Don Mora MD

## 2018-09-25 NOTE — PROGRESS NOTES
DATE OF VISIT: 9/25/2018    REASON FOR VISIT: Followup for limited stage small cell lung cancer [id number].     HISTORY OF PRESENT ILLNESS: The patient is a very pleasant 62 y.o. female with past medical history significant for small cell lung cancer diagnosed 12/30/2016. The patient presented with pneumonia unresponsive to antibiotics. Chest x-ray was completed that revealed a right upper lobe lung mass, confirmed on CT angiogram with multiple pulmonary nodules and left lower lobe infiltrate. He underwent bronchoscopy with biopsies on 12/30/2016 with pathology revealing small kevin lung cancer. The patient had staging work up including PET scan and MRI brain that failed to show evidence of distant metastatic disease. The patient was started on definitive treatment with cisplatin/etoposide with radiation on 2/8/2017. The pateint completed cycle # 4 on 04/21/2017. The patient elected to proceed with two additional cycles of cisplatin/etoposide, and completed cycle #6 on 6/8/2017. She had evidence of disease progression documented on CAT scan completed 4/23/2018. She was started on second line therapy using Opdivo/Yervoy on 5/1/2018.  Completed 4 cycles July 17, 2018.  Started Opdivo single agent July 31, 2018.  She is here today for scheduled follow up visit with treatment cycle #3.     SUBJECTIVE: The patient is here today with her daughter. She complains of productive cough with yellowish sputum. She complains of chronic constipation that is not relieved by stool softeners. She complains of left shoulder pain and left sided upper back pain.      PAST MEDICAL HISTORY/SOCIAL HISTORY/FAMILY HISTORY: Reviewed by me and unchanged from my documentation done on 08/28/18.    Review of Systems   Constitutional: Negative for activity change, appetite change, chills, fatigue, fever and unexpected weight change.   HENT: Positive for dental problem. Negative for hearing loss, mouth sores, nosebleeds, sore throat and trouble  swallowing.    Eyes: Negative for visual disturbance.   Respiratory: Negative for cough, chest tightness, shortness of breath and wheezing.    Cardiovascular: Positive for leg swelling. Negative for chest pain and palpitations.   Gastrointestinal: Positive for constipation. Negative for abdominal distention, abdominal pain, blood in stool, diarrhea, nausea, rectal pain and vomiting.   Endocrine: Negative for cold intolerance and heat intolerance.   Genitourinary: Negative for difficulty urinating, dysuria, frequency and urgency.   Musculoskeletal: Positive for arthralgias. Negative for back pain, gait problem, joint swelling and myalgias.   Skin: Positive for rash.   Neurological: Negative for dizziness, tremors, syncope, weakness, light-headedness, numbness and headaches.   Hematological: Negative for adenopathy. Does not bruise/bleed easily.   Psychiatric/Behavioral: Negative for confusion, sleep disturbance and suicidal ideas. The patient is not nervous/anxious.          Current Outpatient Prescriptions:   •  acetaminophen (TYLENOL) 500 MG tablet, Take 650 mg by mouth 3 (Three) Times a Day As Needed for mild pain (1-3)., Disp: , Rfl:   •  amLODIPine (NORVASC) 5 MG tablet, TAKE 1 TABLET BY MOUTH EVERY DAY, Disp: 30 tablet, Rfl: 0  •  atorvastatin (LIPITOR) 40 MG tablet, Take 40 mg by mouth Every Night., Disp: , Rfl:   •  BREO ELLIPTA 200-25 MCG/INH aerosol powder , INHALE 1 PUFF D, Disp: , Rfl: 2  •  citalopram (CeleXA) 40 MG tablet, Take 40 mg by mouth Daily., Disp: , Rfl: 0  •  doxycycline (VIBRAMYCIN) 100 MG capsule, Take 1 capsule by mouth 2 (Two) Times a Day., Disp: 20 capsule, Rfl: 0  •  gabapentin (NEURONTIN) 600 MG tablet, Take 1 tablet by mouth 3 (Three) Times a Day., Disp: 60 tablet, Rfl: 3  •  levothyroxine (SYNTHROID, LEVOTHROID) 88 MCG tablet, Take 88 mcg by mouth Daily., Disp: , Rfl:   •  lidocaine-prilocaine (EMLA) 2.5-2.5 % cream, Apply  topically As Needed (45-60 minutes prior to port access.   "Cover with saran/plastic wrap.)., Disp: 30 g, Rfl: 3  •  LORazepam (ATIVAN) 1 MG tablet, Take 1 tablet by mouth Every 8 (Eight) Hours As Needed for Anxiety. for anxiety, Disp: 90 tablet, Rfl: 2  •  Naproxen Sodium (ALEVE PO), Take 1 tablet/day by mouth Daily As Needed., Disp: , Rfl:   •  omeprazole (priLOSEC) 40 MG capsule, Take 1 capsule by mouth Daily., Disp: 30 capsule, Rfl: 5  •  ondansetron (ZOFRAN) 8 MG tablet, Take 1 tablet by mouth 3 (Three) Times a Day As Needed for Nausea or Vomiting., Disp: 30 tablet, Rfl: 5  •  pilocarpine (SALAGEN) 5 MG tablet, TK 1 T PO 3-4 TIMES D, Disp: , Rfl: 2  •  Polyethylene Glycol 3350 (MIRALAX PO), Take  by mouth., Disp: , Rfl:   •  promethazine-dextromethorphan (PROMETHAZINE-DM) 6.25-15 MG/5ML syrup, Take 5 mL by mouth 4 (Four) Times a Day As Needed for Cough., Disp: 180 mL, Rfl: 0  •  sennosides-docusate sodium (SENOKOT-S) 8.6-50 MG tablet, Take 1 tablet by mouth Daily., Disp: , Rfl:   •  temazepam (RESTORIL) 30 MG capsule, Take 1 capsule by mouth At Night As Needed for Sleep., Disp: 30 capsule, Rfl: 2  •  tiZANidine (ZANAFLEX) 2 MG tablet, TK 1 T PO TID, Disp: , Rfl: 0  •  zolpidem (AMBIEN) 10 MG tablet, TK 1 T PO QD HS, Disp: , Rfl: 2    PHYSICAL EXAMINATION:   /88   Pulse 84   Temp 97.8 °F (36.6 °C) (Temporal Artery )   Resp 18   Ht 165.1 cm (65\")   Wt 88.6 kg (195 lb 6.4 oz)   SpO2 95% Comment: RA  BMI 32.52 kg/m²    ECOG Performance Status: 1 - Symptomatic but completely ambulatory  General Appearance:  alert, cooperative, no apparent distress and appears stated age   Neurologic/Psychiatric: A&O x 3, gait steady, appropriate affect, strength 5/5 in all muscle groups   HEENT:  Normocephalic, without obvious abnormality, mucous membranes moist   Neck: Supple, symmetrical, trachea midline, no adenopathy;  No thyromegaly, masses, or tenderness   Lungs:   Clear to auscultation bilaterally; respirations regular, even, and unlabored bilaterally   Heart:  Regular " rate and rhythm, no murmurs appreciated   Abdomen:   Soft, non-tender, non-distended and no organomegaly   Lymph nodes: No cervical, supraclavicular, inguinal or axillary adenopathy noted   Extremities: Normal, atraumatic; no clubbing, cyanosis, or edema    Skin: No rashes, ulcers, or suspicious lesions noted     No visits with results within 2 Week(s) from this visit.   Latest known visit with results is:   Infusion on 08/28/2018   Component Date Value Ref Range Status   • Glucose 08/28/2018 113* 70 - 100 mg/dL Final   • BUN 08/28/2018 12  9 - 23 mg/dL Final   • Creatinine 08/28/2018 0.76  0.60 - 1.30 mg/dL Final   • Sodium 08/28/2018 138  132 - 146 mmol/L Final   • Potassium 08/28/2018 3.7  3.5 - 5.5 mmol/L Final   • Chloride 08/28/2018 106  99 - 109 mmol/L Final   • CO2 08/28/2018 28.0  20.0 - 31.0 mmol/L Final   • Calcium 08/28/2018 9.3  8.7 - 10.4 mg/dL Final   • Total Protein 08/28/2018 6.9  5.7 - 8.2 g/dL Final   • Albumin 08/28/2018 4.30  3.20 - 4.80 g/dL Final   • ALT (SGPT) 08/28/2018 52* 7 - 40 U/L Final   • AST (SGOT) 08/28/2018 37* 0 - 33 U/L Final   • Alkaline Phosphatase 08/28/2018 110* 25 - 100 U/L Final   • Total Bilirubin 08/28/2018 0.6  0.3 - 1.2 mg/dL Final   • eGFR Non African Amer 08/28/2018 77  >60 mL/min/1.73 Final   • Globulin 08/28/2018 2.6  gm/dL Final   • A/G Ratio 08/28/2018 1.7  1.5 - 2.5 g/dL Final   • BUN/Creatinine Ratio 08/28/2018 15.8  7.0 - 25.0 Final   • Anion Gap 08/28/2018 4.0  3.0 - 11.0 mmol/L Final   • TSH 08/28/2018 1.829  0.350 - 5.350 mIU/mL Final   • Free T4 08/28/2018 1.43  0.89 - 1.76 ng/dL Final   • WBC 08/28/2018 7.30  3.50 - 10.80 10*3/mm3 Final   • RBC 08/28/2018 5.48* 3.89 - 5.14 10*6/mm3 Final   • Hemoglobin 08/28/2018 14.9  11.5 - 15.5 g/dL Final   • Hematocrit 08/28/2018 47.5* 34.5 - 44.0 % Final   • RDW 08/28/2018 15.0* 11.3 - 14.5 % Final   • MCV 08/28/2018 86.6  80.0 - 99.0 fL Final   • MCH 08/28/2018 27.2  27.0 - 31.0 pg Final   • MCHC 08/28/2018 31.4* 32.0  - 36.0 g/dL Final   • MPV 08/28/2018 8.1  6.0 - 12.0 fL Final   • Platelets 08/28/2018 189  150 - 450 10*3/mm3 Final   • Neutrophil % 08/28/2018 75.9* 41.0 - 71.0 % Final   • Lymphocyte % 08/28/2018 15.6* 24.0 - 44.0 % Final   • Monocyte % 08/28/2018 8.5  0.0 - 12.0 % Final   • Neutrophils, Absolute 08/28/2018 5.50  1.50 - 8.30 10*3/mm3 Final   • Lymphocytes, Absolute 08/28/2018 1.10  0.60 - 4.80 10*3/mm3 Final   • Monocytes, Absolute 08/28/2018 0.60  0.00 - 1.00 10*3/mm3 Final   • Creatinine 08/28/2018 0.70  0.60 - 1.30 mg/dL Final    Serial Number: [id number]:  [id number]        No results found.    ASSESSMENT: The patient is a very pleasant 62 y.o. female  with small cell lung cancer.     PROBLEM LIST:  1. Presented with cough, shortness of breath, and pneumonia.   2. Right upper lobe mass with multiple scattered pulmonary nodules bilaterally.   3. Status post bronchoscopy with biopsy revealing small cell lung cancer.   4. PET scan done 2/2/2017 shows disease in the right upper lobe with no evidence of distant metastatic disease. MRI brain negative on 2/2/2017  5.  Started definitive chemotherapy with radiation using cisplatin and etoposide on February 7, 2017.  Status post 6 cycles completed 6/8/2017.  6.  Status post CyberKnife left upper lobe lung nodule completed on March 22, 2018  7.  Progressive disease doctor had CAT scan done April 23, 2018 with increasing size of right lung nodules   8. Started on second line treatment using Opdivo/Yervoy on 5/1/2018. Status post 4 cycles   9.  Started Opdivo 480 mg IV every 4 weeks July 31, 2018, status post 2 cycles    10. History of tonsillar cancer status post chemo/radiation.   11.History of cervical cancer status post hysterectomy.  12. 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  Hypercholesterolemia  14. Hypertension.  15.  COPD  16. Anxiety and depression.  17. Immune therapy induced rash.   18.  Hypothyroidism.  19.  Arthralgia    PLAN:  1. We will proceed  today with cycle #3 of Opdivo as planned today.   2. We will monitor the patient's labs throughout treatment including blood counts, kidney function, and liver functions.   3.  We reviewed again potential side effects of immunotherapy including but not limited to immune mediated reactions with thyroiditis, pneumonitis, hepatitis, colitis, rash, and electrolytes abnormalities, fatigue, multiorgan failure, and possibly death.  4. The patient will continue Zofran to be used as needed for nausea.   5. The patient declined prophylactic whole brain radiation.    6.  She will continue Restoril 30 mg every bedtime as needed for insomnia.  I will refill it today.  7. She will continue gabapentin, 600 mg, I will increase the dose to 3 times a day for chemotherapy induced neuropathy.  I will refill it today.  8.  We'll continue Norvasc for hypertension.  We'll consider adjusting the dose if she started to become hypotensive.  9.  We'll continue Synthroid for hypothyroid  10.  We'll continue Lipitor for hypercholesterolemia, we'll continue to monitor her liver enzymes.  11.  I will monitor patient blood work including blood counts, kidney function, thyroid function and liver enzymes.  12.  The patient will follow-up with me in 4 weeks.  13.   I'll repeat her scans before cycle #4.  I will order this prior to return.   14. I recommended the patient use a good topical moisturizer to her skin as well as topical hydrocortisone cream as needed for skin rash.  15.  I will continue Ativan 1 mg to be taken every 8 hours as needed for anxiety.  I will refill it today.  16.  I will start the patient on tramadol 50 mg every 6 hours as needed for arthralgia that possibly induced by her immunotherapy.    aZy Tan MD  9/25/2018

## 2018-10-26 ENCOUNTER — APPOINTMENT (OUTPATIENT)
Dept: ONCOLOGY | Facility: HOSPITAL | Age: 63
End: 2018-10-26

## 2019-01-30 ENCOUNTER — TELEPHONE (OUTPATIENT)
Dept: ONCOLOGY | Facility: CLINIC | Age: 64
End: 2019-01-30

## (undated) DEVICE — THE HOBBS MICROBIOLOGY BRUSH IS INTENDED TO BE USED IN COMBINATION WITH A COMPATIBLE FLEXIBLE ENDOSCOPE TO COLLECT CELLS OF THE MUCOSA FOR PATHOLOGICAL DIAGNOSIS DURING ENDOSCOPY. IT IS INSERTED THROUGH THE WORKING CHANNEL OF THE ENDOSCOPE AND CONSISTS OF A FLEXIBLE INSERTION PORTION MADE OF A METAL COIL INSIDE A PLASTIC TUBE, WHOSE DISTAL END IS EQUIPPED WITH PLASTIC BRISTLES FOR HARVESTING AND PROTECTING MUCOSAL CELLS, E.G., DURING ENDOSCOPY. THIS IS A SINGLE-USE DEVICE.: Brand: HOBBS MICROBIOLOGY BRUSH

## (undated) DEVICE — SNAP KOVER: Brand: UNBRANDED

## (undated) DEVICE — NDL HYPO ECLPS SFTY 22G 1 1/2IN

## (undated) DEVICE — PTCH SENSR INREACH PULM GUIDANCE

## (undated) DEVICE — SUT MONOCRYL PLS ANTIB UND 3/0  PS1 27IN

## (undated) DEVICE — FRCP BIOP SUPERDIMENSION PREMARK

## (undated) DEVICE — TRAP,MUCUS SPECIMEN,40CC: Brand: MEDLINE

## (undated) DEVICE — ELECTRODE,ECG,FOAM, 3/PK: Brand: MEDLINE

## (undated) DEVICE — SUT SILK 2/0 SH 30IN K833H

## (undated) DEVICE — PK MINOR SPLT 10

## (undated) DEVICE — MEDI-VAC YANKAUER SUCTION HANDLE W/BULBOUS TIP: Brand: CARDINAL HEALTH

## (undated) DEVICE — SENSR O2 OXIMAX FNGR A/ 18IN NONSTR

## (undated) DEVICE — C-ARM: Brand: UNBRANDED

## (undated) DEVICE — AIRWY 90MM NO9

## (undated) DEVICE — INTENDED FOR TISSUE SEPARATION, AND OTHER PROCEDURES THAT REQUIRE A SHARP SURGICAL BLADE TO PUNCTURE OR CUT.: Brand: BARD-PARKER ® STAINLESS STEEL BLADES

## (undated) DEVICE — SOL LR 1000ML

## (undated) DEVICE — DECANT BG O JET

## (undated) DEVICE — ADAPT SWVL FIBROPTIC BRONCH

## (undated) DEVICE — NDL ASP VIZISHOT FLX 19GA

## (undated) DEVICE — CANNULA,ADULT,SOFT-TOUCH,7TUBE,SC: Brand: MEDLINE

## (undated) DEVICE — CANN NASL CO2 DIVIDED A/

## (undated) DEVICE — DRSNG SURESITE WNDW 4X4.5

## (undated) DEVICE — BOWL UTIL STRL 32OZ

## (undated) DEVICE — ADAPT ST INFUS ADMIN SYR 70IN

## (undated) DEVICE — SYR SLPTP 20CC

## (undated) DEVICE — NDL HYPO SFTY PROEDGE 27G 1 1/4IN GRY

## (undated) DEVICE — SYR LL TP 10ML STRL

## (undated) DEVICE — 3M™ IOBAN™ 2 ANTIMICROBIAL INCISE DRAPE 6650EZ: Brand: IOBAN™ 2

## (undated) DEVICE — ENCORE® LATEX MICRO SIZE 8, STERILE LATEX POWDER-FREE SURGICAL GLOVE: Brand: ENCORE

## (undated) DEVICE — MEDI-VAC NON-CONDUCTIVE SUCTION TUBING: Brand: CARDINAL HEALTH

## (undated) DEVICE — SYR CONTRL LUERLOK 10CC

## (undated) DEVICE — STERILE LATEX POWDER FREE SURGICAL GLOVES WITH HYDROGEL COATING: Brand: PROTEXIS

## (undated) DEVICE — SKIN AFFIX SURG ADHESIVE 72/CS 0.55ML: Brand: MEDLINE

## (undated) DEVICE — SUT SILK 3/0 TIES 18IN A184H

## (undated) DEVICE — FRCP BX RADJAW4 PULM WO NDL STD1.8X2 100

## (undated) DEVICE — ANTIBACTERIAL UNDYED BRAIDED (POLYGLACTIN 910), SYNTHETIC ABSORBABLE SUTURE: Brand: COATED VICRYL

## (undated) DEVICE — NDL HYPO ECLPS SFTY 18G 1 1/2IN

## (undated) DEVICE — ADAPT BRONCH EDGE FOR USE W/OLYMPUS SCOPE

## (undated) DEVICE — SINGLE USE SUCTION VALVE MAJ-209: Brand: SINGLE USE SUCTION VALVE (STERILE)

## (undated) DEVICE — APPL CHLORAPREP W/TINT 26ML BLU